# Patient Record
Sex: MALE | Race: WHITE | NOT HISPANIC OR LATINO | Employment: OTHER | URBAN - METROPOLITAN AREA
[De-identification: names, ages, dates, MRNs, and addresses within clinical notes are randomized per-mention and may not be internally consistent; named-entity substitution may affect disease eponyms.]

---

## 2017-08-03 ENCOUNTER — OFFICE VISIT (OUTPATIENT)
Dept: NEUROSURGERY | Facility: CLINIC | Age: 72
End: 2017-08-03

## 2017-08-03 VITALS — BODY MASS INDEX: 31.08 KG/M2 | HEIGHT: 67 IN | WEIGHT: 198 LBS

## 2017-08-03 DIAGNOSIS — M51.26 HERNIATION OF INTERVERTEBRAL DISC OF LUMBAR SPINE: Primary | ICD-10-CM

## 2017-08-03 DIAGNOSIS — M51.36 DDD (DEGENERATIVE DISC DISEASE), LUMBAR: ICD-10-CM

## 2017-08-03 PROCEDURE — 99203 OFFICE O/P NEW LOW 30 MIN: CPT | Performed by: NEUROLOGICAL SURGERY

## 2017-08-03 RX ORDER — TRIAMTERENE AND HYDROCHLOROTHIAZIDE 37.5; 25 MG/1; MG/1
1 TABLET ORAL EVERY MORNING
Refills: 1 | COMMUNITY
Start: 2017-04-28 | End: 2022-08-08 | Stop reason: SDUPTHER

## 2017-08-03 RX ORDER — MONTELUKAST SODIUM 10 MG/1
10 TABLET ORAL NIGHTLY
COMMUNITY
Start: 2017-05-23

## 2017-08-03 RX ORDER — LEVOTHYROXINE SODIUM 0.12 MG/1
125 TABLET ORAL DAILY
Refills: 1 | COMMUNITY
Start: 2017-06-23 | End: 2022-01-24

## 2017-08-03 RX ORDER — GLIMEPIRIDE 4 MG/1
4 TABLET ORAL 2 TIMES DAILY
Refills: 1 | COMMUNITY
Start: 2017-05-10

## 2017-08-03 RX ORDER — ATENOLOL 25 MG/1
25 TABLET ORAL
Refills: 3 | COMMUNITY
Start: 2017-05-10

## 2017-08-03 RX ORDER — TRANDOLAPRIL TABLETS 4 MG/1
4 TABLET ORAL EVERY MORNING
Refills: 3 | COMMUNITY
Start: 2017-06-16

## 2017-08-03 RX ORDER — PRAVASTATIN SODIUM 10 MG
TABLET ORAL
Refills: 1 | COMMUNITY
Start: 2017-06-23 | End: 2017-10-23

## 2017-08-03 NOTE — PROGRESS NOTES
Subjective   Patient ID: Joni Hoffman is a 72 y.o. male is being seen for consultation today at the request of Little Barker MD  Chief Complaint: Left hip and leg pain    History of Present Illness: The patient is a 72-year-old man from [facility] with a complaint of pain in his lower back that radiates down his left hip and leg as far as the foot.  It's worse when he sits a long time, although if he rides a tractor feels better.  He has had chiropractic therapy without seeing any real improvement.  Pain calms and goes, some days bothering him quite a bit, other days sometimes 2 or 3 in a row relatively minor.  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  About a year ago he had right back and hip pain which has improved.    His wife was seen earlier today with symptoms of lumbar lateral recess stenosis.    Review of Radiographic Studies:  Lumbar MRI scan dated 6/12/17 shows a herniated disc at L5-S1 on the left, with degenerative facet arthritis.    The following portions of the patient's history were reviewed, updated as appropriate and approved: allergies, current medications, past family history, past medical history, past social history, past surgical history, review of systems and problem list.  Review of Systems   Constitutional: Negative for activity change, appetite change, chills, diaphoresis, fatigue, fever and unexpected weight change.   HENT: Positive for hearing loss and sinus pressure. Negative for congestion, dental problem, drooling, ear discharge, ear pain, facial swelling, mouth sores, nosebleeds, postnasal drip, rhinorrhea, sneezing, sore throat, tinnitus, trouble swallowing and voice change.    Eyes: Negative for photophobia, pain, discharge, redness, itching and visual disturbance.   Respiratory: Negative for apnea, cough, choking, chest tightness, shortness of  breath, wheezing and stridor.    Cardiovascular: Positive for chest pain. Negative for palpitations and leg swelling.   Gastrointestinal: Negative for abdominal distention, abdominal pain, anal bleeding, blood in stool, constipation, diarrhea, nausea, rectal pain and vomiting.   Endocrine: Negative for cold intolerance, heat intolerance, polydipsia, polyphagia and polyuria.   Genitourinary: Negative for decreased urine volume, difficulty urinating, dysuria, enuresis, flank pain, frequency, genital sores, hematuria and urgency.   Musculoskeletal: Positive for arthralgias, back pain and myalgias. Negative for gait problem, joint swelling, neck pain and neck stiffness.   Skin: Negative for color change, pallor, rash and wound.   Allergic/Immunologic: Positive for environmental allergies and food allergies. Negative for immunocompromised state.   Neurological: Negative for dizziness, tremors, seizures, syncope, facial asymmetry, speech difficulty, weakness, light-headedness, numbness and headaches.   Hematological: Negative for adenopathy. Does not bruise/bleed easily.   Psychiatric/Behavioral: Negative for agitation, behavioral problems, confusion, decreased concentration, dysphoric mood, hallucinations, self-injury, sleep disturbance and suicidal ideas. The patient is not nervous/anxious and is not hyperactive.        Objective     NEUROLOGICAL EXAMINATION:      MENTAL STATUS:  Alert and oriented.  Speech intact.  Recent and remote memory intact.      CRANIAL NERVES:  Cranial nerve II:  Visual fields are full to confrontation.  Cranial nerves III, IV and VI:  PERRLADC.  Extraocular movements are intact.  Nystagmus is not present.  Cranial nerve V:  Facial sensation is intact to light touch.  Cranial nerve VII:  Muscles of facial expression reveal no asymmetry.  Cranial nerve VIII:  Hearing is intact to finger rub bilaterally.  Cranial nerves IX and X:  Palate elevates symmetrically.  Cranial nerve XI:  Shoulder shrug  is intact.  Cranial nerve XII:  Tongue is midline without evidence of atrophy or fasciculation.    MUSCULOSKELETAL:  SLR negative. Dmitriy's test negative.    MOTOR:  Deltoid, biceps, triceps, and  strength intact.  No hand atrophy.  Hip flexion, knee extension, ankle dorsiflexion and plantar flexion intact. No tremor, spasticity, ataxia, or dysmetria.    SENSATION: Intact to touch upper and lower extremities.  Position sense intact.    REFLEXES:  DTR absent on the left ankle, present at 2+ on the right ankle, 2+ in both knees.    Assessment   Symptomatic herniated disc L5-S1 left       Plan   Follow-up after his wife has her surgery to see if symptoms are still bothersome enough to consider surgery.  If so we would schedule a lumbar discectomy at L5-S1 on the left.       Elbert De Anda MD

## 2017-09-28 DIAGNOSIS — M51.26 HERNIATION OF INTERVERTEBRAL DISC OF LUMBAR SPINE: Primary | ICD-10-CM

## 2017-10-23 ENCOUNTER — APPOINTMENT (OUTPATIENT)
Dept: PREADMISSION TESTING | Facility: HOSPITAL | Age: 72
End: 2017-10-23

## 2017-10-23 VITALS — HEIGHT: 67 IN | WEIGHT: 207.4 LBS | BODY MASS INDEX: 32.55 KG/M2

## 2017-10-23 DIAGNOSIS — M51.26 LUMBAR DISC HERNIATION: ICD-10-CM

## 2017-10-23 DIAGNOSIS — M51.26 LUMBAR DISC HERNIATION: Primary | ICD-10-CM

## 2017-10-23 DIAGNOSIS — R79.9 ABNORMAL FINDING OF BLOOD CHEMISTRY: ICD-10-CM

## 2017-10-23 LAB
ALBUMIN SERPL-MCNC: 4.3 G/DL (ref 3.2–4.8)
ALBUMIN/GLOB SERPL: 1.5 G/DL (ref 1.5–2.5)
ALP SERPL-CCNC: 66 U/L (ref 25–100)
ALT SERPL W P-5'-P-CCNC: 28 U/L (ref 7–40)
ANION GAP SERPL CALCULATED.3IONS-SCNC: 8 MMOL/L (ref 3–11)
AST SERPL-CCNC: 17 U/L (ref 0–33)
BILIRUB SERPL-MCNC: 0.4 MG/DL (ref 0.3–1.2)
BUN BLD-MCNC: 27 MG/DL (ref 9–23)
BUN/CREAT SERPL: 19.3 (ref 7–25)
CALCIUM SPEC-SCNC: 9.7 MG/DL (ref 8.7–10.4)
CHLORIDE SERPL-SCNC: 102 MMOL/L (ref 99–109)
CO2 SERPL-SCNC: 26 MMOL/L (ref 20–31)
CREAT BLD-MCNC: 1.4 MG/DL (ref 0.6–1.3)
DEPRECATED RDW RBC AUTO: 42 FL (ref 37–54)
ERYTHROCYTE [DISTWIDTH] IN BLOOD BY AUTOMATED COUNT: 12.9 % (ref 11.3–14.5)
GFR SERPL CREATININE-BSD FRML MDRD: 50 ML/MIN/1.73
GLOBULIN UR ELPH-MCNC: 2.9 GM/DL
GLUCOSE BLD-MCNC: 146 MG/DL (ref 70–100)
HBA1C MFR BLD: 7.8 % (ref 4.8–5.6)
HCT VFR BLD AUTO: 46.4 % (ref 38.9–50.9)
HGB BLD-MCNC: 15.6 G/DL (ref 13.1–17.5)
MCH RBC QN AUTO: 30.1 PG (ref 27–31)
MCHC RBC AUTO-ENTMCNC: 33.6 G/DL (ref 32–36)
MCV RBC AUTO: 89.4 FL (ref 80–99)
PLATELET # BLD AUTO: 189 10*3/MM3 (ref 150–450)
PMV BLD AUTO: 10.1 FL (ref 6–12)
POTASSIUM BLD-SCNC: 4.1 MMOL/L (ref 3.5–5.5)
PROT SERPL-MCNC: 7.2 G/DL (ref 5.7–8.2)
RBC # BLD AUTO: 5.19 10*6/MM3 (ref 4.2–5.76)
SODIUM BLD-SCNC: 136 MMOL/L (ref 132–146)
WBC NRBC COR # BLD: 9.98 10*3/MM3 (ref 3.5–10.8)

## 2017-10-23 PROCEDURE — 93005 ELECTROCARDIOGRAM TRACING: CPT

## 2017-10-23 PROCEDURE — 85027 COMPLETE CBC AUTOMATED: CPT | Performed by: NEUROLOGICAL SURGERY

## 2017-10-23 PROCEDURE — 83036 HEMOGLOBIN GLYCOSYLATED A1C: CPT | Performed by: NEUROLOGICAL SURGERY

## 2017-10-23 PROCEDURE — 87081 CULTURE SCREEN ONLY: CPT | Performed by: NEUROLOGICAL SURGERY

## 2017-10-23 PROCEDURE — 36415 COLL VENOUS BLD VENIPUNCTURE: CPT

## 2017-10-23 PROCEDURE — 93010 ELECTROCARDIOGRAM REPORT: CPT | Performed by: INTERNAL MEDICINE

## 2017-10-23 PROCEDURE — 80053 COMPREHEN METABOLIC PANEL: CPT | Performed by: NEUROLOGICAL SURGERY

## 2017-10-23 RX ORDER — FAMOTIDINE 10 MG
10 TABLET ORAL EVERY MORNING
COMMUNITY

## 2017-10-23 RX ORDER — ASPIRIN 81 MG/1
81 TABLET ORAL DAILY
COMMUNITY

## 2017-10-23 RX ORDER — CHLORHEXIDINE GLUCONATE 4 G/100ML
SOLUTION TOPICAL
Qty: 120 ML
Start: 2017-10-23 | End: 2017-10-27 | Stop reason: HOSPADM

## 2017-10-23 RX ORDER — ASPIRIN 325 MG
325 TABLET ORAL DAILY
Status: ON HOLD | COMMUNITY
End: 2017-10-27 | Stop reason: ALTCHOICE

## 2017-10-23 RX ORDER — CETIRIZINE HYDROCHLORIDE 10 MG/1
10 TABLET ORAL
Status: ON HOLD | COMMUNITY
End: 2020-02-19

## 2017-10-23 RX ORDER — MOMETASONE FUROATE 50 UG/1
2 SPRAY, METERED NASAL EVERY MORNING
COMMUNITY

## 2017-10-23 RX ORDER — SODIUM CHLORIDE, SODIUM LACTATE, POTASSIUM CHLORIDE, CALCIUM CHLORIDE 600; 310; 30; 20 MG/100ML; MG/100ML; MG/100ML; MG/100ML
100 INJECTION, SOLUTION INTRAVENOUS CONTINUOUS
Status: CANCELLED | OUTPATIENT
Start: 2017-10-23

## 2017-10-23 RX ORDER — ROSUVASTATIN CALCIUM 10 MG/1
10 TABLET, COATED ORAL NIGHTLY
COMMUNITY
End: 2021-09-20 | Stop reason: ALTCHOICE

## 2017-10-23 RX ORDER — SODIUM CHLORIDE 0.9 % (FLUSH) 0.9 %
1-10 SYRINGE (ML) INJECTION AS NEEDED
Status: CANCELLED | OUTPATIENT
Start: 2017-10-23

## 2017-10-23 NOTE — H&P
HISTORY AND PHYSICAL for SURGERY    Referring Provider: Little Barker MD    Chief complaint: Left hip and leg pain    Subjective .   History of present illness:    The patient is a 72-year-old man from [facility] with a complaint of pain in his lower back that radiates down his left hip and leg as far as the foot.  It's worse when he sits a long time, although if he rides a tractor feels better.  He has had chiropractic therapy without seeing any real improvement.  Pain calms and goes, some days bothering him quite a bit, other days sometimes 2 or 3 in a row relatively minor.  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  About a year ago he had right back and hip pain which has improved.    History  Past Medical History:   Diagnosis Date   • Arthritis    • Diabetes mellitus       Past Surgical History:   Procedure Laterality Date   • CORONARY STENT PLACEMENT      family history includes Diabetes in his sister; Heart disease in his brother, father, and mother.   Social History     Social History   • Marital status:      Spouse name: N/A   • Number of children: N/A   • Years of education: N/A     Social History Main Topics   • Smoking status: Current Every Day Smoker   • Smokeless tobacco: Not on file   • Alcohol use Not on file   • Drug use: Not on file   • Sexual activity: Not on file     Other Topics Concern   • Not on file     Social History Narrative   • No narrative on file       Allergies:  Penicillins    Objective   Review of Systems  Constitutional: Negative for activity change, appetite change, chills, diaphoresis, fatigue, fever and unexpected weight change.   HENT: Positive for hearing loss and sinus pressure. Negative for congestion, dental problem, drooling, ear discharge, ear pain, facial swelling, mouth sores, nosebleeds, postnasal drip, rhinorrhea, sneezing, sore  throat, tinnitus, trouble swallowing and voice change.    Eyes: Negative for photophobia, pain, discharge, redness, itching and visual disturbance.   Respiratory: Negative for apnea, cough, choking, chest tightness, shortness of breath, wheezing and stridor.    Cardiovascular: Positive for chest pain. Negative for palpitations and leg swelling.   Gastrointestinal: Negative for abdominal distention, abdominal pain, anal bleeding, blood in stool, constipation, diarrhea, nausea, rectal pain and vomiting.   Endocrine: Negative for cold intolerance, heat intolerance, polydipsia, polyphagia and polyuria.   Genitourinary: Negative for decreased urine volume, difficulty urinating, dysuria, enuresis, flank pain, frequency, genital sores, hematuria and urgency.   Musculoskeletal: Positive for arthralgias, back pain and myalgias. Negative for gait problem, joint swelling, neck pain and neck stiffness.   Skin: Negative for color change, pallor, rash and wound.   Allergic/Immunologic: Positive for environmental allergies and food allergies. Negative for immunocompromised state.   Neurological: Negative for dizziness, tremors, seizures, syncope, facial asymmetry, speech difficulty, weakness, light-headedness, numbness and headaches.   Hematological: Negative for adenopathy. Does not bruise/bleed easily.   Psychiatric/Behavioral: Negative for agitation, behavioral problems, confusion, decreased concentration, dysphoric mood, hallucinations, self-injury, sleep disturbance and suicidal ideas. The patient is not nervous/anxious and is not hyperactive.         Physical Exam:  NEUROLOGICAL EXAMINATION:       MENTAL STATUS:  Alert and oriented.  Speech intact.  Recent and remote memory intact.       CRANIAL NERVES:  Cranial nerve II:  Visual fields are full to confrontation.  Cranial nerves III, IV and VI:  PERRLADC.  Extraocular movements are intact.  Nystagmus is not present.  Cranial nerve V:  Facial sensation is intact to light  touch.  Cranial nerve VII:  Muscles of facial expression reveal no asymmetry.  Cranial nerve VIII:  Hearing is intact to finger rub bilaterally.  Cranial nerves IX and X:  Palate elevates symmetrically.  Cranial nerve XI:  Shoulder shrug is intact.  Cranial nerve XII:  Tongue is midline without evidence of atrophy or fasciculation.     MUSCULOSKELETAL:  SLR negative. Dmitriy's test negative.     MOTOR:  Deltoid, biceps, triceps, and  strength intact.  No hand atrophy.  Hip flexion, knee extension, ankle dorsiflexion and plantar flexion intact. No tremor, spasticity, ataxia, or dysmetria.     SENSATION: Intact to touch upper and lower extremities.  Position sense intact.     REFLEXES:  DTR absent on the left ankle, present at 2+ on the right ankle, 2+ in both knees.      Results Review:  Lumbar MRI scan dated 6/12/17 shows a herniated disc at L5-S1 on the left, with degenerative facet arthritis.    Assessment/Plan   Assessment       Symptomatic herniated disc L5-S1 left    Plan:   lumbar discectomy at L5-S1 on the left.       Dona Greer PA-C  On behalf of Elbert De Anda MD  10/23/17  10:33 AM

## 2017-10-25 LAB — MRSA SPEC QL CULT: NORMAL

## 2017-10-26 ENCOUNTER — DOCUMENTATION (OUTPATIENT)
Dept: NEUROSURGERY | Facility: CLINIC | Age: 72
End: 2017-10-26

## 2017-10-26 NOTE — PROGRESS NOTES
Pt called this morning to let us know that she fell earlier this morning in her living room on her R knee. She wanted to know if she is still okay to have surgery tomorrow (10/27/17) with     She states that it doesn't hurt, no swelling, doesn't hurt when she walks or any other type of movement. She said it looks a little bruised.    I adv her that she should be perfectly fine to undergo surgery, and that she is in good hands. Pt verbalized understanding and will call me if she has any issues with her R-Knee.

## 2017-10-27 ENCOUNTER — ANESTHESIA (OUTPATIENT)
Dept: PERIOP | Facility: HOSPITAL | Age: 72
End: 2017-10-27

## 2017-10-27 ENCOUNTER — APPOINTMENT (OUTPATIENT)
Dept: GENERAL RADIOLOGY | Facility: HOSPITAL | Age: 72
End: 2017-10-27

## 2017-10-27 ENCOUNTER — HOSPITAL ENCOUNTER (OUTPATIENT)
Facility: HOSPITAL | Age: 72
Discharge: HOME | End: 2017-10-27
Attending: NEUROLOGICAL SURGERY | Admitting: NEUROLOGICAL SURGERY

## 2017-10-27 ENCOUNTER — ANESTHESIA EVENT (OUTPATIENT)
Dept: PERIOP | Facility: HOSPITAL | Age: 72
End: 2017-10-27

## 2017-10-27 VITALS
OXYGEN SATURATION: 94 % | TEMPERATURE: 98.1 F | HEIGHT: 67 IN | DIASTOLIC BLOOD PRESSURE: 71 MMHG | WEIGHT: 207 LBS | SYSTOLIC BLOOD PRESSURE: 131 MMHG | RESPIRATION RATE: 20 BRPM | BODY MASS INDEX: 32.49 KG/M2 | HEART RATE: 64 BPM

## 2017-10-27 DIAGNOSIS — M51.26 LUMBAR DISC HERNIATION: ICD-10-CM

## 2017-10-27 LAB
GLUCOSE BLDC GLUCOMTR-MCNC: 173 MG/DL (ref 70–130)
GLUCOSE BLDC GLUCOMTR-MCNC: 199 MG/DL (ref 70–130)
POTASSIUM BLDA-SCNC: 4.09 MMOL/L (ref 3.5–5.3)

## 2017-10-27 PROCEDURE — S0260 H&P FOR SURGERY: HCPCS | Performed by: NEUROLOGICAL SURGERY

## 2017-10-27 PROCEDURE — A9270 NON-COVERED ITEM OR SERVICE: HCPCS | Performed by: ANESTHESIOLOGY

## 2017-10-27 PROCEDURE — 76000 FLUOROSCOPY <1 HR PHYS/QHP: CPT

## 2017-10-27 PROCEDURE — 25010000002 ONDANSETRON PER 1 MG: Performed by: NURSE ANESTHETIST, CERTIFIED REGISTERED

## 2017-10-27 PROCEDURE — 25010000002 PROPOFOL 10 MG/ML EMULSION: Performed by: NURSE ANESTHETIST, CERTIFIED REGISTERED

## 2017-10-27 PROCEDURE — 63710000001 FAMOTIDINE 20 MG TABLET: Performed by: ANESTHESIOLOGY

## 2017-10-27 PROCEDURE — 63710000001 INSULIN LISPRO (HUMAN) PER 5 UNITS: Performed by: NURSE ANESTHETIST, CERTIFIED REGISTERED

## 2017-10-27 PROCEDURE — 63030 LAMOT DCMPRN NRV RT 1 LMBR: CPT | Performed by: NEUROLOGICAL SURGERY

## 2017-10-27 PROCEDURE — 25010000002 DEXAMETHASONE PER 1 MG: Performed by: NURSE ANESTHETIST, CERTIFIED REGISTERED

## 2017-10-27 PROCEDURE — A9270 NON-COVERED ITEM OR SERVICE: HCPCS | Performed by: NURSE ANESTHETIST, CERTIFIED REGISTERED

## 2017-10-27 PROCEDURE — 25010000002 PHENYLEPHRINE PER 1 ML: Performed by: NURSE ANESTHETIST, CERTIFIED REGISTERED

## 2017-10-27 PROCEDURE — 25010000002 NEOSTIGMINE PER 0.5 MG: Performed by: NURSE ANESTHETIST, CERTIFIED REGISTERED

## 2017-10-27 PROCEDURE — 25010000002 FENTANYL CITRATE (PF) 100 MCG/2ML SOLUTION: Performed by: NURSE ANESTHETIST, CERTIFIED REGISTERED

## 2017-10-27 PROCEDURE — 82962 GLUCOSE BLOOD TEST: CPT

## 2017-10-27 PROCEDURE — 25010000002 VANCOMYCIN 10 G RECONSTITUTED SOLUTION: Performed by: NEUROLOGICAL SURGERY

## 2017-10-27 PROCEDURE — 84132 ASSAY OF SERUM POTASSIUM: CPT | Performed by: ANESTHESIOLOGY

## 2017-10-27 RX ORDER — SODIUM CHLORIDE, SODIUM LACTATE, POTASSIUM CHLORIDE, CALCIUM CHLORIDE 600; 310; 30; 20 MG/100ML; MG/100ML; MG/100ML; MG/100ML
INJECTION, SOLUTION INTRAVENOUS CONTINUOUS PRN
Status: DISCONTINUED | OUTPATIENT
Start: 2017-10-27 | End: 2017-10-27

## 2017-10-27 RX ORDER — SODIUM CHLORIDE 0.9 % (FLUSH) 0.9 %
1-10 SYRINGE (ML) INJECTION AS NEEDED
Status: DISCONTINUED | OUTPATIENT
Start: 2017-10-27 | End: 2017-10-27 | Stop reason: HOSPADM

## 2017-10-27 RX ORDER — MAGNESIUM HYDROXIDE 1200 MG/15ML
LIQUID ORAL AS NEEDED
Status: DISCONTINUED | OUTPATIENT
Start: 2017-10-27 | End: 2017-10-27 | Stop reason: HOSPADM

## 2017-10-27 RX ORDER — FAMOTIDINE 10 MG/ML
20 INJECTION, SOLUTION INTRAVENOUS ONCE
Status: DISCONTINUED | OUTPATIENT
Start: 2017-10-27 | End: 2017-10-27

## 2017-10-27 RX ORDER — PROMETHAZINE HYDROCHLORIDE 25 MG/1
25 TABLET ORAL ONCE AS NEEDED
Status: DISCONTINUED | OUTPATIENT
Start: 2017-10-27 | End: 2017-10-27 | Stop reason: HOSPADM

## 2017-10-27 RX ORDER — PROMETHAZINE HYDROCHLORIDE 25 MG/1
25 SUPPOSITORY RECTAL ONCE AS NEEDED
Status: DISCONTINUED | OUTPATIENT
Start: 2017-10-27 | End: 2017-10-27 | Stop reason: HOSPADM

## 2017-10-27 RX ORDER — GLYCOPYRROLATE 0.2 MG/ML
INJECTION INTRAMUSCULAR; INTRAVENOUS AS NEEDED
Status: DISCONTINUED | OUTPATIENT
Start: 2017-10-27 | End: 2017-10-27 | Stop reason: SURG

## 2017-10-27 RX ORDER — ATRACURIUM BESYLATE 10 MG/ML
INJECTION, SOLUTION INTRAVENOUS AS NEEDED
Status: DISCONTINUED | OUTPATIENT
Start: 2017-10-27 | End: 2017-10-27 | Stop reason: SURG

## 2017-10-27 RX ORDER — LIDOCAINE HYDROCHLORIDE 10 MG/ML
0.5 INJECTION, SOLUTION EPIDURAL; INFILTRATION; INTRACAUDAL; PERINEURAL ONCE AS NEEDED
Status: COMPLETED | OUTPATIENT
Start: 2017-10-27 | End: 2017-10-27

## 2017-10-27 RX ORDER — ONDANSETRON 2 MG/ML
INJECTION INTRAMUSCULAR; INTRAVENOUS AS NEEDED
Status: DISCONTINUED | OUTPATIENT
Start: 2017-10-27 | End: 2017-10-27 | Stop reason: SURG

## 2017-10-27 RX ORDER — PROMETHAZINE HYDROCHLORIDE 25 MG/ML
6.25 INJECTION, SOLUTION INTRAMUSCULAR; INTRAVENOUS ONCE AS NEEDED
Status: DISCONTINUED | OUTPATIENT
Start: 2017-10-27 | End: 2017-10-27 | Stop reason: HOSPADM

## 2017-10-27 RX ORDER — ONDANSETRON 2 MG/ML
4 INJECTION INTRAMUSCULAR; INTRAVENOUS ONCE AS NEEDED
Status: DISCONTINUED | OUTPATIENT
Start: 2017-10-27 | End: 2017-10-27 | Stop reason: HOSPADM

## 2017-10-27 RX ORDER — SODIUM CHLORIDE 9 MG/ML
9 INJECTION, SOLUTION INTRAVENOUS CONTINUOUS PRN
Status: DISCONTINUED | OUTPATIENT
Start: 2017-10-27 | End: 2017-10-27 | Stop reason: HOSPADM

## 2017-10-27 RX ORDER — DEXAMETHASONE SODIUM PHOSPHATE 4 MG/ML
INJECTION, SOLUTION INTRA-ARTICULAR; INTRALESIONAL; INTRAMUSCULAR; INTRAVENOUS; SOFT TISSUE AS NEEDED
Status: DISCONTINUED | OUTPATIENT
Start: 2017-10-27 | End: 2017-10-27 | Stop reason: SURG

## 2017-10-27 RX ORDER — FENTANYL CITRATE 50 UG/ML
INJECTION, SOLUTION INTRAMUSCULAR; INTRAVENOUS AS NEEDED
Status: DISCONTINUED | OUTPATIENT
Start: 2017-10-27 | End: 2017-10-27 | Stop reason: SURG

## 2017-10-27 RX ORDER — FAMOTIDINE 20 MG/1
20 TABLET, FILM COATED ORAL ONCE
Status: COMPLETED | OUTPATIENT
Start: 2017-10-27 | End: 2017-10-27

## 2017-10-27 RX ORDER — BUPIVACAINE HYDROCHLORIDE AND EPINEPHRINE 2.5; 5 MG/ML; UG/ML
INJECTION, SOLUTION EPIDURAL; INFILTRATION; INTRACAUDAL; PERINEURAL AS NEEDED
Status: DISCONTINUED | OUTPATIENT
Start: 2017-10-27 | End: 2017-10-27 | Stop reason: HOSPADM

## 2017-10-27 RX ORDER — FENTANYL CITRATE 50 UG/ML
50 INJECTION, SOLUTION INTRAMUSCULAR; INTRAVENOUS
Status: DISCONTINUED | OUTPATIENT
Start: 2017-10-27 | End: 2017-10-27 | Stop reason: HOSPADM

## 2017-10-27 RX ORDER — SODIUM CHLORIDE, SODIUM LACTATE, POTASSIUM CHLORIDE, CALCIUM CHLORIDE 600; 310; 30; 20 MG/100ML; MG/100ML; MG/100ML; MG/100ML
9 INJECTION, SOLUTION INTRAVENOUS CONTINUOUS
Status: DISCONTINUED | OUTPATIENT
Start: 2017-10-27 | End: 2017-10-27

## 2017-10-27 RX ORDER — LIDOCAINE HYDROCHLORIDE 10 MG/ML
INJECTION, SOLUTION INFILTRATION; PERINEURAL AS NEEDED
Status: DISCONTINUED | OUTPATIENT
Start: 2017-10-27 | End: 2017-10-27 | Stop reason: SURG

## 2017-10-27 RX ORDER — PROPOFOL 10 MG/ML
VIAL (ML) INTRAVENOUS AS NEEDED
Status: DISCONTINUED | OUTPATIENT
Start: 2017-10-27 | End: 2017-10-27 | Stop reason: SURG

## 2017-10-27 RX ORDER — HYDROMORPHONE HYDROCHLORIDE 1 MG/ML
0.5 INJECTION, SOLUTION INTRAMUSCULAR; INTRAVENOUS; SUBCUTANEOUS
Status: DISCONTINUED | OUTPATIENT
Start: 2017-10-27 | End: 2017-10-27 | Stop reason: HOSPADM

## 2017-10-27 RX ADMIN — LIDOCAINE HYDROCHLORIDE 50 MG: 10 INJECTION, SOLUTION INFILTRATION; PERINEURAL at 08:07

## 2017-10-27 RX ADMIN — FENTANYL CITRATE 50 MCG: 50 INJECTION, SOLUTION INTRAMUSCULAR; INTRAVENOUS at 08:07

## 2017-10-27 RX ADMIN — EPHEDRINE SULFATE 10 MG: 50 INJECTION INTRAMUSCULAR; INTRAVENOUS; SUBCUTANEOUS at 08:18

## 2017-10-27 RX ADMIN — Medication 4 MG: at 08:49

## 2017-10-27 RX ADMIN — INSULIN LISPRO 2 UNITS: 100 INJECTION, SOLUTION INTRAVENOUS; SUBCUTANEOUS at 09:30

## 2017-10-27 RX ADMIN — GLYCOPYRROLATE 0.6 MG: 0.2 INJECTION, SOLUTION INTRAMUSCULAR; INTRAVENOUS at 08:49

## 2017-10-27 RX ADMIN — ATRACURIUM BESYLATE 40 MG: 10 INJECTION, SOLUTION INTRAVENOUS at 08:07

## 2017-10-27 RX ADMIN — FAMOTIDINE 20 MG: 20 TABLET, FILM COATED ORAL at 06:45

## 2017-10-27 RX ADMIN — SODIUM CHLORIDE 9 ML/HR: 9 INJECTION, SOLUTION INTRAVENOUS at 06:23

## 2017-10-27 RX ADMIN — LIDOCAINE HYDROCHLORIDE 0.2 ML: 10 INJECTION, SOLUTION EPIDURAL; INFILTRATION; INTRACAUDAL; PERINEURAL at 06:23

## 2017-10-27 RX ADMIN — PROPOFOL 150 MG: 10 INJECTION, EMULSION INTRAVENOUS at 08:07

## 2017-10-27 RX ADMIN — EPHEDRINE SULFATE 10 MG: 50 INJECTION INTRAMUSCULAR; INTRAVENOUS; SUBCUTANEOUS at 08:28

## 2017-10-27 RX ADMIN — FENTANYL CITRATE 50 MCG: 50 INJECTION, SOLUTION INTRAMUSCULAR; INTRAVENOUS at 08:57

## 2017-10-27 RX ADMIN — GLYCOPYRROLATE 0.2 MG: 0.2 INJECTION, SOLUTION INTRAMUSCULAR; INTRAVENOUS at 08:30

## 2017-10-27 RX ADMIN — DEXAMETHASONE SODIUM PHOSPHATE 8 MG: 4 INJECTION, SOLUTION INTRAMUSCULAR; INTRAVENOUS at 08:15

## 2017-10-27 RX ADMIN — PHENYLEPHRINE HYDROCHLORIDE 100 MCG: 10 INJECTION INTRAVENOUS at 08:11

## 2017-10-27 RX ADMIN — ONDANSETRON 4 MG: 2 INJECTION INTRAMUSCULAR; INTRAVENOUS at 08:26

## 2017-10-27 RX ADMIN — VANCOMYCIN HYDROCHLORIDE 1250 MG: 10 INJECTION, POWDER, LYOPHILIZED, FOR SOLUTION INTRAVENOUS at 07:12

## 2017-10-27 RX ADMIN — SODIUM CHLORIDE: 9 INJECTION, SOLUTION INTRAVENOUS at 08:02

## 2017-10-27 NOTE — OP NOTE
OPERATIVE REPORT    DATE OF OPERATION: 10/27/17    PREOPERATIVE DIAGNOSIS: Lateral recess stenosis with herniated disc L5-S1 left    POSTOPERATIVE DIAGNOSIS: Same    PROCEDURE PERFORMED: Left L5-S1 lumbar laminotomy and discectomy    SURGEON: Elbert De Anda MD    ASSISTANT: Dona Greer PA-C    INDICATIONS: Patient had a persisting left hip and leg pain in a sciatic pattern with MRI findings of a herniated disc L5-S1 on the left with lateral recess stenosis.  Decompression was elected.    DESCRIPTON OF PROCEDURE: Surgery was performed under general anesthesia in the prone position on the laminectomy frame.  The back was prepared sterilely and draped.  The left L5 lamina was identified with a spinal needle and an AP fluoroscopic image.  A short skin incision was made and a guidewire and dilators were used to place a 6 cm length x 18 mm width tubular retractor.  AP fluoroscopy confirmed this to be the [L5-S1] level on the [left].    A high-speed drill was used to perform a laminotomy by removing the inferior aspect of the lamina and thinning the medial aspect of the facet until the floor of the lateral recess was reached.  A Kerrison punch was used to remove the ligamentum flavum and expose the dura and floor of the lateral recess.  The epidural veins were coagulated with bipolar and divided with microscissors.    The dural sac and nerve root were retracted medially exposing the herniated disc.  A focal subligamentous disc herniation with a small hemispheric protrusion was found.  A nerve root retractor was placed over the nerve root sleeve for exposure of the disc. The epidural veins were further coagulated and Gelfoam and a cottonoid placed in the cranial portion of the lateral recess for hemostasis.. An #15 scalpel blade was used to open the posterior disc annulus. Discectomy was performed using straight and up-biting micropituitary rongeurs, removing the herniated disc component and performing a limited nuclear  discectomy.  Following discectomy the dura, nerve root and epidural space were inspected to be sure decompression was complete and no disc fragments remained.     The nerve root retractor was removed and the laminotomy site was irrigated to confirm complete hemostasis. Gelfoam was placed over the laminotomy site.  The tubular retractor was removed.  Marcaine was injected in the paraspinous muscle.  Subcuticular Vicryl closure was performed and Dermabond Prineo was applied to the skin.  Blood loss was estimated at 15 mL.    Elbert De Anda M.D.

## 2017-10-27 NOTE — ANESTHESIA PREPROCEDURE EVALUATION
Anesthesia Evaluation     Patient summary reviewed and Nursing notes reviewed   no history of anesthetic complications:  NPO Solid Status: > 8 hours  NPO Liquid Status: > 8 hours     Airway   Mallampati: III  TM distance: >3 FB  Neck ROM: full  possible difficult intubation  Dental - normal exam     Pulmonary - normal exam   (+) a smoker Former, sleep apnea (snores heavily but no official diagnosis),   Cardiovascular   Exercise tolerance: good (4-7 METS)    ECG reviewed  Rhythm: regular  Rate: normal    (+) hypertension well controlled, past MI  >12 months, CAD, cardiac stents more than 12 months ago   (-) angina, MILNER      Neuro/Psych  (-) seizures, neuromuscular disease, TIA, syncope, tremors, weakness  GI/Hepatic/Renal/Endo    (+)  renal disease CRI, diabetes mellitus type 2 well controlled, hypothyroidism,   (-) GERD, hepatitis, liver disease    Musculoskeletal     (+) arthralgias, back pain,   Abdominal    Substance History - negative use     OB/GYN          Other   (+) arthritis     (-) blood dyscrasia, history of cancer                                  Anesthesia Plan    ASA 3     general   (Labs/studies reviewed  Possible glidescope)  intravenous induction   Anesthetic plan and risks discussed with patient.    Plan discussed with CRNA.

## 2017-10-27 NOTE — ANESTHESIA POSTPROCEDURE EVALUATION
Patient: Joni Hoffman    Procedure Summary     Date Anesthesia Start Anesthesia Stop Room / Location    10/27/17 0802 0908  [facility] / [facility]       Procedure Diagnosis Surgeon Provider    LUMBAR DISCECTOMY L5-S1 LEFT  (Left Spine Lumbar) Herniation of intervertebral disc of lumbar spine  (Herniation of intervertebral disc of lumbar spine [M51.26]) MD Chiara Lyman MD          Anesthesia Type: general  Last vitals  BP   123/91 (10/27/17 0907)   Temp   98.2 °F (36.8 °C) (10/27/17 0907)   Pulse   84 (10/27/17 0907)   Resp   18 (10/27/17 0907)     SpO2   96 % (10/27/17 0907)     Post Anesthesia Care and Evaluation    Patient location during evaluation: PACU  Patient participation: complete - patient participated  Level of consciousness: awake and alert  Pain score: 0  Pain management: adequate  Airway patency: patent  Anesthetic complications: No anesthetic complications  PONV Status: none  Cardiovascular status: hemodynamically stable and acceptable  Respiratory status: nonlabored ventilation, acceptable and nasal cannula  Hydration status: acceptable

## 2017-10-27 NOTE — BRIEF OP NOTE
LUMBAR DISCECTOMY MICRO ENDOSCOPIC  Progress Note    Joni Hoffman  10/27/2017    Pre-op Diagnosis:   Herniation of intervertebral disc of lumbar spine [M51.26]       Post-Op Diagnosis Codes:     * Herniation of intervertebral disc of lumbar spine [M51.26]    Procedure/CPT® Codes:      Procedure(s):  LUMBAR DISCECTOMY L5-S1 LEFT     Surgeon(s):  Elbert De Anda MD    Anesthesia: General    Staff:   Circulator: Altagracia Nunez RN  Scrub Person: Emma Kendall  Nursing Assistant: Alton Benavidez  Assistant: Dona Greer PA-C  Orientee: Lulu Rosado RN; Shelby Patel    Estimated Blood Loss: minimal    Urine Voided: * No values recorded between 10/27/2017  8:02 AM and 10/27/2017  8:53 AM *    Specimens:                None      Drains:           Findings: Lateral recess stenosis with herniated disc L5-S1 left    Complications: None      Elbert De Anda MD     Date: 10/27/2017  Time: 8:53 AM

## 2017-10-27 NOTE — PLAN OF CARE
Problem: Patient Care Overview (Adult)  Goal: Discharge Needs Assessment  Outcome: Outcome(s) achieved Date Met:  10/27/17

## 2017-10-27 NOTE — PLAN OF CARE
Problem: Perioperative Period (Adult)  Goal: Signs and Symptoms of Listed Potential Problems Will be Absent or Manageable (Perioperative Period)  Outcome: Outcome(s) achieved Date Met:  10/27/17

## 2017-10-27 NOTE — H&P
Pre-Op H&P  Joni Hoffman  [id number]  1945    Date of Service: 10/27/2017    Chief complaint:  Back and left leg pain    HPI:Patient is a 72 y.o.male presents with 10 month history of back and radiating left leg pain. He denies any changes in presenting symptoms since last seen by the MD. No recent fever.     He has 2 cardiac stents and has cardiac clearance for surgery today.    Review of Systems:  General ROS: negative for chills, fever or skin lesions;  No changes since last office visit  Cardiovascular ROS: no chest pain or dyspnea on exertion  Respiratory ROS: no cough, shortness of breath, or wheezing    Allergies:   Allergies   Allergen Reactions   • Penicillins Rash       Home Meds:    Prescriptions Prior to Admission   Medication Sig Dispense Refill Last Dose   • aspirin 81 MG EC tablet Take 81 mg by mouth Daily.   10/27/2017 at 0430   • atenolol (TENORMIN) 25 MG tablet Take 25 mg by mouth Daily With Breakfast.  3 10/27/2017 at 0430   • cetirizine (zyrTEC) 10 MG tablet Take 10 mg by mouth As Needed for Allergies.   10/27/2017 at 0430   • chlorhexidine (HIBICLENS) 4 % external liquid Shower each day with solution for 5 days beginning 5 days before surgery. 120 mL  10/26/2017 at 0830   • famotidine (PEPCID) 10 MG tablet Take 10 mg by mouth Every Morning.   10/27/2017 at 0430   • glimepiride (AMARYL) 4 MG tablet Take 4 mg by mouth 2 (Two) Times a Day. Takes 4 mg in the am and 2 mg in the PM  1 10/26/2017 at 2030   • levothyroxine (SYNTHROID, LEVOTHROID) 125 MCG tablet Take 125 mcg by mouth Daily.  1 10/26/2017 at 0430   • montelukast (SINGULAIR) 10 MG tablet Take 10 mg by mouth Every Night.   10/26/2017 at 2030   • rosuvastatin (CRESTOR) 10 MG tablet Take 10 mg by mouth Daily.   10/27/2017 at 0430   • trandolapril (MAVIK) 4 MG tablet Take 4 mg by mouth Daily.  3 10/27/2017 at 0430   • triamterene-hydrochlorothiazide (MAXZIDE-25) 37.5-25 MG per tablet Take 1 tablet by mouth Daily.  1 10/27/2017 at 0434  "  • mometasone (NASONEX) 50 MCG/ACT nasal spray 2 sprays into each nostril Daily.   1 week ago       PMH:   Past Medical History:   Diagnosis Date   • Acid indigestion     related to meds   • Arthritis    • Coronary artery disease 2000    s/p 2 stents after MI    • Diabetes mellitus 2007    po meds; checks blood sugars at home run    • Disease of thyroid gland     hypothyroidism    • Hearing loss     uses hearing aids    • History of kidney stones     passed   • Hypertension    • Lumbar back pain    • MI, old 2000    2 stents inserted   • Wears glasses    • Wears hearing aid      PSH:    Past Surgical History:   Procedure Laterality Date   • APPENDECTOMY     • CATARACT EXTRACTION WITH INTRAOCULAR LENS IMPLANT Bilateral    • COLONOSCOPY     • CORONARY STENT PLACEMENT     • NASAL POLYP SURGERY         Immunization History:  Influenza: 2016  Pneumococcal: yes  Tetanus: yes    Social History:   Tobacco:   History   Smoking Status   • Former Smoker   • Packs/day: 1.00   • Years: 25.00   • Types: Cigarettes   • Quit date: 1985   Smokeless Tobacco   • Current User   • Types: Chew     Comment: hx of cigarette use for 25 years and chewed about 25 years (currently chews on occasion)       Alcohol:     History   Alcohol Use No       Vitals:           /82 (BP Location: Right arm, Patient Position: Lying)  Pulse 64  Temp 97 °F (36.1 °C) (Temporal Artery )   Resp 18  Ht 67\" (170.2 cm)  Wt 207 lb (93.9 kg)  SpO2 95%  BMI 32.42 kg/m2    Physical Exam:  General Appearance:    Alert, cooperative, no distress, appears stated age   Head:    Normocephalic, without obvious abnormality, atraumatic   Lungs:     Clear to auscultation bilaterally, respirations unlabored    Heart:   Regular rate and rhythm, S1 and S2 normal, no murmur, rub    or gallop    Abdomen:    Soft, non-tender.  +bowel sounds   Breast Exam:    deferred   Genitalia:    deferred   Extremities:   Extremities normal, atraumatic, no cyanosis or edema "   Skin:   Skin color, texture, turgor normal, no rashes or lesions   Neurologic:   Grossly intact   Results Review  I reviewed the patient's new clinical results.  CBC, Chem profile, and EKG on chart.    Cancer Staging (if applicable)  Cancer Patient: __ yes _x_no __unknown; If yes, clinical stage T:__ N:__M:__, stage group or __N/A    Impression: Herniated lumbar disc    Plan: For left L5S1 discectomy today.    IRAIS Gauthier   10/27/2017   6:50 AM

## 2017-11-28 ENCOUNTER — OFFICE VISIT (OUTPATIENT)
Dept: NEUROSURGERY | Facility: CLINIC | Age: 72
End: 2017-11-28

## 2017-11-28 VITALS
BODY MASS INDEX: 32.18 KG/M2 | TEMPERATURE: 96.3 F | WEIGHT: 205 LBS | DIASTOLIC BLOOD PRESSURE: 68 MMHG | SYSTOLIC BLOOD PRESSURE: 130 MMHG | HEART RATE: 69 BPM | HEIGHT: 67 IN

## 2017-11-28 DIAGNOSIS — M51.26 LUMBAR DISC HERNIATION: Primary | ICD-10-CM

## 2017-11-28 PROCEDURE — 99024 POSTOP FOLLOW-UP VISIT: CPT | Performed by: PHYSICIAN ASSISTANT

## 2017-11-28 NOTE — PROGRESS NOTES
Joni Hoffman is a 72 y.o. male who presents for a 4 week  follow up appointment status post left L5-S1 lumbar laminotomy and discectomy that took place on 10/27/2017.     HISTORY OF PRESENT ILLNESS and HOSPITAL COURSE:  The patient is a 72-year-old man from [facility] with a complaint of pain in his lower back that radiates down his left hip and leg as far as the foot.  It's worse when he sits a long time, although if he rides a tractor feels better.  He has had chiropractic therapy without seeing any real improvement.  Pain calms and goes, some days bothering him quite a bit, other days sometimes 2 or 3 in a row relatively minor.  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  About a year ago he had right back and hip pain which has improved.    In the post-operative 4 weeks the patient has had excellent relief of the symptoms experienced prior to surgery.  The surgical incision site is clean, dry, healed.  The symptoms that he experienced prior to surgery have significantly improved and he is very pleased with the surgical outcome.    REVIEW OF SYSTEMS:  Review of Systems   Constitutional: Negative for chills, fatigue and fever.   HENT: Positive for hearing loss, sneezing and tinnitus.    Eyes: Positive for redness.   Musculoskeletal: Negative for arthralgias, back pain and myalgias.   Allergic/Immunologic: Positive for food allergies.   Neurological: Negative for weakness and numbness.   Psychiatric/Behavioral: Negative for sleep disturbance.       The following portions of the patient's history were reviewed and updated as appropriate: allergies, current medications, past family history, past medical history, past social history, past surgical history and problem list.     PHYSICAL EXAM:  Physical Exam   Constitutional: He is oriented to person, place, and time. He appears  well-developed and well-nourished.   Neck: Normal range of motion. Neck supple.   Pulmonary/Chest: Effort normal and breath sounds normal.   Musculoskeletal: Normal range of motion.   Neurological: He is alert and oriented to person, place, and time.   Skin: Skin is warm, dry and intact.   Psychiatric: He has a normal mood and affect. His behavior is normal. Judgment and thought content normal.        ASSESSMENT AND PLAN:  Mr. Hoffman is a delightful patient that has recovered remarkably well from surgery.  His symptoms experienced prior to surgery have resolved and he is gradually and cautiously returning to his normal activities.  This will serve as his final postoperative appointment, however,  I instructed the patient to contact the office if concerns or questions arise following today's appointment.  Additional instructions were given to the patient regarding physical activity and pertinent restrictions at this point in time, which were acknowledged with full understanding by the patient.  It has been a pleasure providing neurosurgical care to this patient and we are grateful for the opportunity.

## 2019-11-14 ENCOUNTER — OFFICE VISIT (OUTPATIENT)
Dept: NEUROSURGERY | Facility: CLINIC | Age: 74
End: 2019-11-14

## 2019-11-14 VITALS — BODY MASS INDEX: 32.11 KG/M2 | HEIGHT: 67 IN

## 2019-11-14 DIAGNOSIS — M51.26 RECURRENT HERNIATION OF LUMBAR DISC: Primary | ICD-10-CM

## 2019-11-14 PROCEDURE — 99213 OFFICE O/P EST LOW 20 MIN: CPT | Performed by: NEUROLOGICAL SURGERY

## 2019-11-14 RX ORDER — METHYLPREDNISOLONE 4 MG/1
TABLET ORAL
Qty: 1 EACH | Refills: 0 | Status: ON HOLD | OUTPATIENT
Start: 2019-11-14 | End: 2019-12-31

## 2019-11-14 NOTE — PROGRESS NOTES
Subjective   Patient ID: Joni Hoffman is a 74 y.o. male is being seen for consultation today at the request of Little Barker MD  Chief Complaint: Left hip and leg pain    History of Present Illness: The patient is a 73-year-old man from [place] with a history of pain in his left hip and leg for 3 weeks.  It began abruptly and spontaneously and was very severe for 2 weeks and has led up someone past week.  It radiates as far as his foot.  The pain is very similar to radiculopathy he had 2 years ago when he had a lumbar discectomy L5-S1 on the left.    Review of Radiographic Studies:  Lumbar MRI scan shows recurrent disc herniation L5-S1 on the left, with prior laminotomy at the site.    The following portions of the patient's history were reviewed, updated as appropriate and approved: allergies, current medications, past family history, past medical history, past social history, past surgical history, review of systems and problem list.  Review of Systems    Objective     NEUROLOGICAL EXAMINATION:      MENTAL STATUS:  Alert and oriented.  Speech intact.  Recent and remote memory intact.      CRANIAL NERVES:  Cranial nerve II:  Visual fields are full.  Cranial nerves III, IV and VI:  PERRLADC.  Extraocular movements are intact.  Nystagmus is not present.  Cranial nerve V:  Facial sensation is intact.  Cranial nerve VII:  Muscles of facial expression reveal no asymmetry.  Cranial nerve VIII:  Hearing is intact.  Cranial nerves IX and X:  Palate elevates symmetrically.  Cranial nerve XI:  Shoulder shrug is intact.  Cranial nerve XII:  Tongue is midline without evidence of atrophy or fasciculation.    MUSCULOSKELETAL: SLR negative bilaterally.    MOTOR: Dorsiflexion and plantarflexion intact bilaterally.    SENSATION: No sensory loss to touch.    REFLEXES:  DTR absent both ankles.    Assessment   Recurrent disc herniation L5-S1 left.       Plan   Medrol Dosepak.  Physical therapy in [place].  Office  follow-up in 3 weeks.       Elbert De Anda MD

## 2019-12-12 ENCOUNTER — OFFICE VISIT (OUTPATIENT)
Dept: NEUROSURGERY | Facility: CLINIC | Age: 74
End: 2019-12-12

## 2019-12-12 VITALS
SYSTOLIC BLOOD PRESSURE: 134 MMHG | HEIGHT: 67 IN | DIASTOLIC BLOOD PRESSURE: 70 MMHG | BODY MASS INDEX: 31.08 KG/M2 | WEIGHT: 198 LBS

## 2019-12-12 DIAGNOSIS — M51.26 RECURRENT HERNIATION OF LUMBAR DISC: Primary | ICD-10-CM

## 2019-12-12 PROCEDURE — 99024 POSTOP FOLLOW-UP VISIT: CPT | Performed by: NEUROLOGICAL SURGERY

## 2019-12-12 NOTE — PROGRESS NOTES
Subjective   Joni Hoffman is a 74 y.o. male who presents for follow up of left hip and leg pain.     The patient is a 74-year-old man from [place] with a history of lumbar discectomy L5-S1 on the left 2 years ago, and the onset of recurrent pain to his left hip and leg 7 weeks ago.  Lumbar MRI scan showed a probable recurrent disc herniation L5-S1 on the left.  He was treated with physical therapy since I saw him on 14 November and there is been no real improvement in symptoms.  He is able to get relief when he standing or walking by bending forward toward his toes and then when he stands up again can walk for several more minutes without the pain recurring.  He cannot use his treadmill now, which he does for exercise to help with his diabetes, because of the left sciatic pain.    The following portions of the patient's history were reviewed, updated as appropriate and approved: allergies, current medications, past family history, past medical history, past social history, past surgical history, review of systems and problem list.     Review of Systems   Constitutional: Negative for activity change, appetite change, chills, diaphoresis, fatigue, fever and unexpected weight change.   HENT: Negative for congestion, dental problem, drooling, ear discharge, ear pain, facial swelling, hearing loss, mouth sores, nosebleeds, postnasal drip, rhinorrhea, sinus pressure, sinus pain, sneezing, sore throat, tinnitus, trouble swallowing and voice change.    Eyes: Negative for photophobia, pain, discharge, redness, itching and visual disturbance.   Respiratory: Negative for apnea, cough, choking, chest tightness, shortness of breath, wheezing and stridor.    Cardiovascular: Negative for chest pain, palpitations and leg swelling.   Gastrointestinal: Negative for abdominal distention, abdominal pain, anal bleeding, blood in stool, constipation, diarrhea, nausea, rectal pain and vomiting.   Endocrine: Negative for cold  intolerance, heat intolerance, polydipsia, polyphagia and polyuria.   Genitourinary: Negative for decreased urine volume, difficulty urinating, dysuria, enuresis, flank pain, frequency, genital sores, hematuria and urgency.   Musculoskeletal: Negative for arthralgias, back pain, gait problem, joint swelling, myalgias, neck pain and neck stiffness.   Skin: Negative for color change, pallor, rash and wound.   Allergic/Immunologic: Negative for environmental allergies, food allergies and immunocompromised state.   Neurological: Negative for dizziness, tremors, seizures, syncope, facial asymmetry, speech difficulty, weakness, light-headedness, numbness and headaches.   Hematological: Negative for adenopathy. Does not bruise/bleed easily.   Psychiatric/Behavioral: Negative for agitation, behavioral problems, confusion, decreased concentration, dysphoric mood, hallucinations, self-injury, sleep disturbance and suicidal ideas. The patient is not nervous/anxious and is not hyperactive.        Objective    NEUROLOGICAL EXAMINATION:    SLR positive left.  No motor or sensory loss.  Absent ankle reflexes bilaterally.    Assessment   Recurrent herniated disc L5-S1 left.       Plan   Lumbar myelogram.  Follow-up after myelography.  Surgical decision will be made based upon the myelographic appearance.       Elbert De Anda MD

## 2019-12-31 ENCOUNTER — APPOINTMENT (OUTPATIENT)
Dept: CT IMAGING | Facility: HOSPITAL | Age: 74
End: 2019-12-31

## 2019-12-31 ENCOUNTER — HOSPITAL ENCOUNTER (OUTPATIENT)
Facility: HOSPITAL | Age: 74
Setting detail: HOSPITAL OUTPATIENT SURGERY
Discharge: HOME | End: 2019-12-31
Attending: RADIOLOGY | Admitting: NEUROLOGICAL SURGERY

## 2019-12-31 VITALS
HEART RATE: 61 BPM | DIASTOLIC BLOOD PRESSURE: 76 MMHG | HEIGHT: 67 IN | SYSTOLIC BLOOD PRESSURE: 136 MMHG | TEMPERATURE: 97.5 F | WEIGHT: 198 LBS | BODY MASS INDEX: 31.08 KG/M2 | OXYGEN SATURATION: 94 %

## 2019-12-31 DIAGNOSIS — M51.26 RECURRENT HERNIATION OF LUMBAR DISC: ICD-10-CM

## 2019-12-31 LAB — GLUCOSE BLDC GLUCOMTR-MCNC: 180 MG/DL (ref 70–130)

## 2019-12-31 PROCEDURE — 82962 GLUCOSE BLOOD TEST: CPT

## 2019-12-31 PROCEDURE — 62304 MYELOGRAPHY LUMBAR INJECTION: CPT | Performed by: RADIOLOGY

## 2019-12-31 PROCEDURE — 0 IOPAMIDOL 41 % SOLUTION: Performed by: RADIOLOGY

## 2019-12-31 PROCEDURE — 72132 CT LUMBAR SPINE W/DYE: CPT

## 2019-12-31 RX ORDER — LIDOCAINE HYDROCHLORIDE 10 MG/ML
INJECTION, SOLUTION EPIDURAL; INFILTRATION; INTRACAUDAL; PERINEURAL AS NEEDED
Status: DISCONTINUED | OUTPATIENT
Start: 2019-12-31 | End: 2019-12-31 | Stop reason: HOSPADM

## 2020-01-02 ENCOUNTER — OFFICE VISIT (OUTPATIENT)
Dept: NEUROSURGERY | Facility: CLINIC | Age: 75
End: 2020-01-02

## 2020-01-02 VITALS — HEIGHT: 67 IN | TEMPERATURE: 96.6 F | BODY MASS INDEX: 31.17 KG/M2 | WEIGHT: 198.6 LBS

## 2020-01-02 DIAGNOSIS — M51.26 RECURRENT HERNIATION OF LUMBAR DISC: Primary | ICD-10-CM

## 2020-01-02 DIAGNOSIS — M51.36 BULGING LUMBAR DISC: ICD-10-CM

## 2020-01-02 PROCEDURE — 99213 OFFICE O/P EST LOW 20 MIN: CPT | Performed by: NEUROLOGICAL SURGERY

## 2020-01-02 NOTE — PROGRESS NOTES
Subjective   Joni Hoffman is a 74 y.o. male who presents for follow up of left hip and leg pain.     The patient has a 3-month history of pain in the left hip and leg in an S1 radicular pattern.  Lumbar myelogram was performed and confirms recurrent disc herniation L5-S1 on the left.    The following portions of the patient's history were reviewed, updated as appropriate and approved: allergies, current medications, past family history, past medical history, past social history, past surgical history, review of systems and problem list.     Review of Systems   Constitutional: Negative for activity change, appetite change, chills, diaphoresis, fatigue, fever and unexpected weight change.   HENT: Negative for congestion, dental problem, drooling, ear discharge, ear pain, facial swelling, hearing loss, mouth sores, nosebleeds, postnasal drip, rhinorrhea, sinus pressure, sinus pain, sneezing, sore throat, tinnitus, trouble swallowing and voice change.    Eyes: Negative for photophobia, pain, discharge, redness, itching and visual disturbance.   Respiratory: Negative for apnea, cough, choking, chest tightness, shortness of breath, wheezing and stridor.    Cardiovascular: Negative for chest pain, palpitations and leg swelling.   Gastrointestinal: Negative for abdominal distention, abdominal pain, anal bleeding, blood in stool, constipation, diarrhea, nausea, rectal pain and vomiting.   Endocrine: Negative for cold intolerance, heat intolerance, polydipsia, polyphagia and polyuria.   Genitourinary: Negative for decreased urine volume, difficulty urinating, dysuria, enuresis, flank pain, frequency, genital sores, hematuria and urgency.   Musculoskeletal: Negative for arthralgias, back pain, gait problem, joint swelling, myalgias, neck pain and neck stiffness.   Skin: Negative for color change, pallor, rash and wound.   Allergic/Immunologic: Negative for environmental allergies, food allergies and immunocompromised  state.   Neurological: Negative for dizziness, tremors, seizures, syncope, facial asymmetry, speech difficulty, weakness, light-headedness, numbness and headaches.   Hematological: Negative for adenopathy. Does not bruise/bleed easily.   Psychiatric/Behavioral: Negative for agitation, behavioral problems, confusion, decreased concentration, dysphoric mood, hallucinations, self-injury, sleep disturbance and suicidal ideas. The patient is not nervous/anxious and is not hyperactive.        Objective    NEUROLOGICAL EXAMINATION:    SLR positive left 45 degrees.  Left ankle jerk reduced.  Motor and sensory intact in the left lower extremity.    Assessment   Recurrent lumbar disc herniation L5-S1 left.       Plan   Lumbar discectomy L5-S1 left.       Elbert De Anda MD

## 2020-01-21 PROBLEM — M51.36 BULGING LUMBAR DISC: Status: ACTIVE | Noted: 2020-01-21

## 2020-01-21 PROBLEM — M51.369 BULGING LUMBAR DISC: Status: ACTIVE | Noted: 2020-01-21

## 2020-01-27 ENCOUNTER — PREP FOR SURGERY (OUTPATIENT)
Dept: OTHER | Facility: HOSPITAL | Age: 75
End: 2020-01-27

## 2020-01-27 DIAGNOSIS — M51.26 RECURRENT HERNIATION OF LUMBAR DISC: Primary | ICD-10-CM

## 2020-01-27 RX ORDER — SODIUM CHLORIDE AND POTASSIUM CHLORIDE 150; 900 MG/100ML; MG/100ML
50 INJECTION, SOLUTION INTRAVENOUS CONTINUOUS
Status: CANCELLED | OUTPATIENT
Start: 2020-01-27

## 2020-01-27 RX ORDER — CHLORHEXIDINE GLUCONATE 4 G/100ML
SOLUTION TOPICAL
Qty: 120 ML | Refills: 0 | Status: SHIPPED | OUTPATIENT
Start: 2020-01-27 | End: 2020-02-19 | Stop reason: HOSPADM

## 2020-01-27 RX ORDER — IBUPROFEN 800 MG/1
800 TABLET ORAL ONCE
Status: CANCELLED | OUTPATIENT
Start: 2020-01-27 | End: 2020-01-27

## 2020-01-27 RX ORDER — HYDROCODONE BITARTRATE AND ACETAMINOPHEN 7.5; 325 MG/1; MG/1
1 TABLET ORAL ONCE
Status: CANCELLED | OUTPATIENT
Start: 2020-01-27 | End: 2020-01-27

## 2020-01-27 RX ORDER — SODIUM CHLORIDE 0.9 % (FLUSH) 0.9 %
3 SYRINGE (ML) INJECTION EVERY 12 HOURS SCHEDULED
Status: CANCELLED | OUTPATIENT
Start: 2020-01-27

## 2020-01-27 RX ORDER — ACETAMINOPHEN 325 MG/1
650 TABLET ORAL ONCE
Status: CANCELLED | OUTPATIENT
Start: 2020-01-27 | End: 2020-01-27

## 2020-02-12 ENCOUNTER — APPOINTMENT (OUTPATIENT)
Dept: PREADMISSION TESTING | Facility: HOSPITAL | Age: 75
End: 2020-02-12

## 2020-02-12 VITALS — BODY MASS INDEX: 31.52 KG/M2 | WEIGHT: 200.8 LBS | HEIGHT: 67 IN

## 2020-02-12 DIAGNOSIS — M51.26 RECURRENT HERNIATION OF LUMBAR DISC: ICD-10-CM

## 2020-02-12 LAB
ANION GAP SERPL CALCULATED.3IONS-SCNC: 14 MMOL/L (ref 5–15)
BUN BLD-MCNC: 23 MG/DL (ref 8–23)
BUN/CREAT SERPL: 20.9 (ref 7–25)
CALCIUM SPEC-SCNC: 9.4 MG/DL (ref 8.6–10.5)
CHLORIDE SERPL-SCNC: 95 MMOL/L (ref 98–107)
CO2 SERPL-SCNC: 28 MMOL/L (ref 22–29)
CREAT BLD-MCNC: 1.1 MG/DL (ref 0.76–1.27)
DEPRECATED RDW RBC AUTO: 42.8 FL (ref 37–54)
ERYTHROCYTE [DISTWIDTH] IN BLOOD BY AUTOMATED COUNT: 12.5 % (ref 12.3–15.4)
GFR SERPL CREATININE-BSD FRML MDRD: 65 ML/MIN/1.73
GLUCOSE BLD-MCNC: 278 MG/DL (ref 65–99)
HCT VFR BLD AUTO: 47 % (ref 37.5–51)
HGB BLD-MCNC: 15.7 G/DL (ref 13–17.7)
MCH RBC QN AUTO: 30.7 PG (ref 26.6–33)
MCHC RBC AUTO-ENTMCNC: 33.4 G/DL (ref 31.5–35.7)
MCV RBC AUTO: 92 FL (ref 79–97)
MRSA DNA SPEC QL NAA+PROBE: NEGATIVE
PLATELET # BLD AUTO: 193 10*3/MM3 (ref 140–450)
PMV BLD AUTO: 10.1 FL (ref 6–12)
POTASSIUM BLD-SCNC: 4 MMOL/L (ref 3.5–5.2)
RBC # BLD AUTO: 5.11 10*6/MM3 (ref 4.14–5.8)
SODIUM BLD-SCNC: 137 MMOL/L (ref 136–145)
WBC NRBC COR # BLD: 12.7 10*3/MM3 (ref 3.4–10.8)

## 2020-02-12 PROCEDURE — 85027 COMPLETE CBC AUTOMATED: CPT | Performed by: NEUROLOGICAL SURGERY

## 2020-02-12 PROCEDURE — 93005 ELECTROCARDIOGRAM TRACING: CPT

## 2020-02-12 PROCEDURE — 93010 ELECTROCARDIOGRAM REPORT: CPT | Performed by: INTERNAL MEDICINE

## 2020-02-12 PROCEDURE — 80048 BASIC METABOLIC PNL TOTAL CA: CPT | Performed by: NEUROLOGICAL SURGERY

## 2020-02-12 PROCEDURE — 87641 MR-STAPH DNA AMP PROBE: CPT | Performed by: NEUROLOGICAL SURGERY

## 2020-02-12 PROCEDURE — 36415 COLL VENOUS BLD VENIPUNCTURE: CPT

## 2020-02-12 RX ORDER — DOXYCYCLINE 100 MG/1
100 CAPSULE ORAL 2 TIMES DAILY
Status: ON HOLD | COMMUNITY
Start: 2020-02-10 | End: 2020-02-19

## 2020-02-12 NOTE — PAT
Patient instructed to drink 20 ounces (or until full) of Gatorade and it needs to be completed 1 hour before given arrival time for procedure (NO RED Gatorade)    Patient verbalized understanding.    Patient to apply Chlorhexadine wipes  to surgical area (as instructed) the night before procedure and the AM of procedure. Wipes provided.    Bactroban and Chlorhexidine Prescription given during PAT visit, as well as written and verbal instructions given to patient during PAT visit.  Patient/family also instructed to complete skin prep checklist and return the checklist on the day of surgery to preoperative staff.  Patient/family verbalized understanding.    Cardiac clearance from Dr Gates office printed and placed on chart

## 2020-02-12 NOTE — DISCHARGE INSTRUCTIONS
The following information and instructions were given:    Do not eat, drink, smoke or chew gum after midnight the night before surgery. This also includes no mints.  Take all routine, prescribed medications including heart and blood pressure medicines with a sip of water unless otherwise instructed by your physician.   Do NOT take diabetic medication unless instructed by your physician.    DO NOT shave for two days before your procedure.  Do not wear makeup.      DO NOT wear fingernail polish (gel/regular) and/or acrylic/artificial nails on the day of surgery.   If you have had a recent manicure and would rather not remove polish or artificial nails, then the minimum requirement is that the polish/artificial nails must be removed from the middle finger on each hand.      If you are having surgery/procedure on an upper extremity, then fingernail polish/artificial fingernails must be removed for surgery.  NO EXCEPTIONS.      If you are having surgery/procedure on a lower extremity, then toenail polish on both extremities must be removed for surgery.  NO EXCEPTIONS.    Remove all jewelry (advise to go to jeweler if unable to remove).  Jewelry, especially rings, can no longer be taped for surgery.    Leave anything you consider valuable at home.    Leave your suitcase in the car until after your surgery.    Bring the following with you the day of your procedure (when applicable)       -picture ID and insurance cards       -Co-pay/deductible required by insurance       -Medications in the original bottles (not a list) including all over-the-counter medications if not brought to PAT       -Copy of advance directive, living will or power of  documents if not brought to PAT       -CPAP or BIPAP mask and tubing (do not bring machine)       -Skin prep instruction(s) sheet       -PAT Pass    Education booklet, brochure, handout or binder related to procedure given to patient.  Education booklet also includes general  information related to their recovery that mentions signs and symptoms of infection and when to call the doctor.    When applicable, an ERAS booklet was given to patient.    Pain Control After Surgery handout given to patient.    Respirex use (handout given to patient) and pneumonia prevention education provided.    Signs and Symptoms of infection were discussed with patient in Pre Admission testing.  Patient instructed to call their doctor if any of the following symptoms are noted during recovery:  fever of 100.4 F or higher, incision that is warm or has increasing bleeding, redness, or drainage.    DVT Prevention instructions given verbally during Pre Admission Testing appointment that stressed the importance of ambulation to improve blood circulation.  Also encouraged patient to perform foot exercises when in bed and that the application of a sequential device might be applied to lower extremities to improve circulation.      Please apply Chlorhexadine wipes to surgical area (if instructed) the night before procedure and the AM of procedure and document date/time of applications on skin prep instruction sheet.        Bactroban and Chlorhexidine Prescription given during PAT visit, as well as written and verbal instructions given to patient during PAT visit.  Patient/family also instructed to complete skin prep checklist and return the checklist on the day of surgery to preoperative staff.  Patient/family verbalized understanding.

## 2020-02-19 ENCOUNTER — ANESTHESIA EVENT (OUTPATIENT)
Dept: PERIOP | Facility: HOSPITAL | Age: 75
End: 2020-02-19

## 2020-02-19 ENCOUNTER — HOSPITAL ENCOUNTER (OUTPATIENT)
Facility: HOSPITAL | Age: 75
Setting detail: HOSPITAL OUTPATIENT SURGERY
Discharge: HOME | End: 2020-02-19
Attending: NEUROLOGICAL SURGERY | Admitting: NEUROLOGICAL SURGERY

## 2020-02-19 ENCOUNTER — ANESTHESIA (OUTPATIENT)
Dept: PERIOP | Facility: HOSPITAL | Age: 75
End: 2020-02-19

## 2020-02-19 ENCOUNTER — APPOINTMENT (OUTPATIENT)
Dept: GENERAL RADIOLOGY | Facility: HOSPITAL | Age: 75
End: 2020-02-19

## 2020-02-19 VITALS
DIASTOLIC BLOOD PRESSURE: 70 MMHG | WEIGHT: 200 LBS | RESPIRATION RATE: 18 BRPM | OXYGEN SATURATION: 92 % | HEART RATE: 70 BPM | HEIGHT: 67 IN | BODY MASS INDEX: 31.39 KG/M2 | TEMPERATURE: 98 F | SYSTOLIC BLOOD PRESSURE: 118 MMHG

## 2020-02-19 DIAGNOSIS — M51.26 RECURRENT HERNIATION OF LUMBAR DISC: Primary | ICD-10-CM

## 2020-02-19 LAB
GLUCOSE BLDC GLUCOMTR-MCNC: 162 MG/DL (ref 70–130)
POTASSIUM BLD-SCNC: 3.8 MMOL/L (ref 3.5–5.2)

## 2020-02-19 PROCEDURE — 82962 GLUCOSE BLOOD TEST: CPT

## 2020-02-19 PROCEDURE — 25010000002 DEXAMETHASONE PER 1 MG: Performed by: NURSE ANESTHETIST, CERTIFIED REGISTERED

## 2020-02-19 PROCEDURE — 76000 FLUOROSCOPY <1 HR PHYS/QHP: CPT

## 2020-02-19 PROCEDURE — S0260 H&P FOR SURGERY: HCPCS | Performed by: NEUROLOGICAL SURGERY

## 2020-02-19 PROCEDURE — 25010000002 VANCOMYCIN 10 G RECONSTITUTED SOLUTION: Performed by: NEUROLOGICAL SURGERY

## 2020-02-19 PROCEDURE — 25010000002 NEOSTIGMINE 10 MG/10ML SOLUTION: Performed by: NURSE ANESTHETIST, CERTIFIED REGISTERED

## 2020-02-19 PROCEDURE — 25010000002 FENTANYL CITRATE (PF) 100 MCG/2ML SOLUTION: Performed by: NURSE ANESTHETIST, CERTIFIED REGISTERED

## 2020-02-19 PROCEDURE — 25010000002 ONDANSETRON PER 1 MG: Performed by: NURSE ANESTHETIST, CERTIFIED REGISTERED

## 2020-02-19 PROCEDURE — 84132 ASSAY OF SERUM POTASSIUM: CPT | Performed by: ANESTHESIOLOGY

## 2020-02-19 PROCEDURE — 25010000002 PROPOFOL 10 MG/ML EMULSION: Performed by: NURSE ANESTHETIST, CERTIFIED REGISTERED

## 2020-02-19 PROCEDURE — 25010000003 LIDOCAINE 1 % SOLUTION: Performed by: NURSE ANESTHETIST, CERTIFIED REGISTERED

## 2020-02-19 PROCEDURE — 63030 LAMOT DCMPRN NRV RT 1 LMBR: CPT | Performed by: NEUROLOGICAL SURGERY

## 2020-02-19 RX ORDER — HYDROCODONE BITARTRATE AND ACETAMINOPHEN 7.5; 325 MG/1; MG/1
1 TABLET ORAL ONCE
Status: DISCONTINUED | OUTPATIENT
Start: 2020-02-19 | End: 2020-02-19 | Stop reason: HOSPADM

## 2020-02-19 RX ORDER — BUPIVACAINE HYDROCHLORIDE AND EPINEPHRINE 2.5; 5 MG/ML; UG/ML
INJECTION, SOLUTION EPIDURAL; INFILTRATION; INTRACAUDAL; PERINEURAL AS NEEDED
Status: DISCONTINUED | OUTPATIENT
Start: 2020-02-19 | End: 2020-02-19 | Stop reason: HOSPADM

## 2020-02-19 RX ORDER — DEXAMETHASONE SODIUM PHOSPHATE 4 MG/ML
INJECTION, SOLUTION INTRA-ARTICULAR; INTRALESIONAL; INTRAMUSCULAR; INTRAVENOUS; SOFT TISSUE AS NEEDED
Status: DISCONTINUED | OUTPATIENT
Start: 2020-02-19 | End: 2020-02-19 | Stop reason: SURG

## 2020-02-19 RX ORDER — ONDANSETRON 2 MG/ML
INJECTION INTRAMUSCULAR; INTRAVENOUS AS NEEDED
Status: DISCONTINUED | OUTPATIENT
Start: 2020-02-19 | End: 2020-02-19 | Stop reason: SURG

## 2020-02-19 RX ORDER — MAGNESIUM HYDROXIDE 1200 MG/15ML
LIQUID ORAL AS NEEDED
Status: DISCONTINUED | OUTPATIENT
Start: 2020-02-19 | End: 2020-02-19 | Stop reason: HOSPADM

## 2020-02-19 RX ORDER — IBUPROFEN 800 MG/1
800 TABLET ORAL ONCE
Status: DISCONTINUED | OUTPATIENT
Start: 2020-02-19 | End: 2020-02-19 | Stop reason: HOSPADM

## 2020-02-19 RX ORDER — ONDANSETRON 2 MG/ML
4 INJECTION INTRAMUSCULAR; INTRAVENOUS ONCE AS NEEDED
Status: DISCONTINUED | OUTPATIENT
Start: 2020-02-19 | End: 2020-02-19 | Stop reason: HOSPADM

## 2020-02-19 RX ORDER — ACETAMINOPHEN 325 MG/1
650 TABLET ORAL ONCE
Status: COMPLETED | OUTPATIENT
Start: 2020-02-19 | End: 2020-02-19

## 2020-02-19 RX ORDER — GLYCOPYRROLATE 0.2 MG/ML
INJECTION INTRAMUSCULAR; INTRAVENOUS AS NEEDED
Status: DISCONTINUED | OUTPATIENT
Start: 2020-02-19 | End: 2020-02-19 | Stop reason: SURG

## 2020-02-19 RX ORDER — SODIUM CHLORIDE, SODIUM LACTATE, POTASSIUM CHLORIDE, CALCIUM CHLORIDE 600; 310; 30; 20 MG/100ML; MG/100ML; MG/100ML; MG/100ML
9 INJECTION, SOLUTION INTRAVENOUS CONTINUOUS PRN
Status: DISCONTINUED | OUTPATIENT
Start: 2020-02-19 | End: 2020-02-19 | Stop reason: HOSPADM

## 2020-02-19 RX ORDER — SODIUM CHLORIDE 0.9 % (FLUSH) 0.9 %
10 SYRINGE (ML) INJECTION AS NEEDED
Status: DISCONTINUED | OUTPATIENT
Start: 2020-02-19 | End: 2020-02-19 | Stop reason: HOSPADM

## 2020-02-19 RX ORDER — FENTANYL CITRATE 50 UG/ML
50 INJECTION, SOLUTION INTRAMUSCULAR; INTRAVENOUS
Status: DISCONTINUED | OUTPATIENT
Start: 2020-02-19 | End: 2020-02-19 | Stop reason: HOSPADM

## 2020-02-19 RX ORDER — HYDROCODONE BITARTRATE AND ACETAMINOPHEN 5; 325 MG/1; MG/1
1 TABLET ORAL ONCE AS NEEDED
Status: DISCONTINUED | OUTPATIENT
Start: 2020-02-19 | End: 2020-02-19 | Stop reason: HOSPADM

## 2020-02-19 RX ORDER — HYDROCODONE BITARTRATE AND ACETAMINOPHEN 5; 325 MG/1; MG/1
1 TABLET ORAL EVERY 6 HOURS PRN
Qty: 30 TABLET | Refills: 0 | Status: SHIPPED | OUTPATIENT
Start: 2020-02-19 | End: 2021-09-20

## 2020-02-19 RX ORDER — FAMOTIDINE 20 MG/1
20 TABLET, FILM COATED ORAL
Status: DISCONTINUED | OUTPATIENT
Start: 2020-02-19 | End: 2020-02-19 | Stop reason: HOSPADM

## 2020-02-19 RX ORDER — ROCURONIUM BROMIDE 10 MG/ML
INJECTION, SOLUTION INTRAVENOUS AS NEEDED
Status: DISCONTINUED | OUTPATIENT
Start: 2020-02-19 | End: 2020-02-19 | Stop reason: SURG

## 2020-02-19 RX ORDER — SODIUM CHLORIDE 0.9 % (FLUSH) 0.9 %
10 SYRINGE (ML) INJECTION EVERY 12 HOURS SCHEDULED
Status: DISCONTINUED | OUTPATIENT
Start: 2020-02-19 | End: 2020-02-19 | Stop reason: HOSPADM

## 2020-02-19 RX ORDER — LIDOCAINE HYDROCHLORIDE 10 MG/ML
0.5 INJECTION, SOLUTION EPIDURAL; INFILTRATION; INTRACAUDAL; PERINEURAL ONCE AS NEEDED
Status: COMPLETED | OUTPATIENT
Start: 2020-02-19 | End: 2020-02-19

## 2020-02-19 RX ORDER — LIDOCAINE HYDROCHLORIDE 10 MG/ML
INJECTION, SOLUTION INFILTRATION; PERINEURAL AS NEEDED
Status: DISCONTINUED | OUTPATIENT
Start: 2020-02-19 | End: 2020-02-19 | Stop reason: SURG

## 2020-02-19 RX ORDER — NEOSTIGMINE METHYLSULFATE 1 MG/ML
INJECTION, SOLUTION INTRAVENOUS AS NEEDED
Status: DISCONTINUED | OUTPATIENT
Start: 2020-02-19 | End: 2020-02-19 | Stop reason: SURG

## 2020-02-19 RX ORDER — FENTANYL CITRATE 50 UG/ML
INJECTION, SOLUTION INTRAMUSCULAR; INTRAVENOUS AS NEEDED
Status: DISCONTINUED | OUTPATIENT
Start: 2020-02-19 | End: 2020-02-19 | Stop reason: SURG

## 2020-02-19 RX ORDER — PROPOFOL 10 MG/ML
VIAL (ML) INTRAVENOUS AS NEEDED
Status: DISCONTINUED | OUTPATIENT
Start: 2020-02-19 | End: 2020-02-19 | Stop reason: SURG

## 2020-02-19 RX ORDER — HYDROMORPHONE HYDROCHLORIDE 1 MG/ML
0.5 INJECTION, SOLUTION INTRAMUSCULAR; INTRAVENOUS; SUBCUTANEOUS
Status: DISCONTINUED | OUTPATIENT
Start: 2020-02-19 | End: 2020-02-19 | Stop reason: HOSPADM

## 2020-02-19 RX ADMIN — GLYCOPYRROLATE 0.4 MG: 0.2 INJECTION, SOLUTION INTRAMUSCULAR; INTRAVENOUS at 12:05

## 2020-02-19 RX ADMIN — VANCOMYCIN HYDROCHLORIDE 1250 MG: 10 INJECTION, POWDER, LYOPHILIZED, FOR SOLUTION INTRAVENOUS at 08:28

## 2020-02-19 RX ADMIN — FENTANYL CITRATE 50 MCG: 50 INJECTION, SOLUTION INTRAMUSCULAR; INTRAVENOUS at 11:04

## 2020-02-19 RX ADMIN — LIDOCAINE HYDROCHLORIDE 0.5 ML: 10 INJECTION, SOLUTION EPIDURAL; INFILTRATION; INTRACAUDAL; PERINEURAL at 08:17

## 2020-02-19 RX ADMIN — EPHEDRINE SULFATE 5 MG: 50 INJECTION INTRAMUSCULAR; INTRAVENOUS; SUBCUTANEOUS at 11:20

## 2020-02-19 RX ADMIN — PROPOFOL 20 MG: 10 INJECTION, EMULSION INTRAVENOUS at 11:06

## 2020-02-19 RX ADMIN — DEXAMETHASONE SODIUM PHOSPHATE 4 MG: 4 INJECTION, SOLUTION INTRAMUSCULAR; INTRAVENOUS at 11:20

## 2020-02-19 RX ADMIN — SODIUM CHLORIDE, POTASSIUM CHLORIDE, SODIUM LACTATE AND CALCIUM CHLORIDE: 600; 310; 30; 20 INJECTION, SOLUTION INTRAVENOUS at 12:07

## 2020-02-19 RX ADMIN — ROCURONIUM BROMIDE 30 MG: 10 INJECTION INTRAVENOUS at 11:05

## 2020-02-19 RX ADMIN — ONDANSETRON 4 MG: 2 INJECTION INTRAMUSCULAR; INTRAVENOUS at 11:59

## 2020-02-19 RX ADMIN — LIDOCAINE HYDROCHLORIDE 50 MG: 10 INJECTION, SOLUTION INFILTRATION; PERINEURAL at 11:04

## 2020-02-19 RX ADMIN — PROPOFOL 20 MG: 10 INJECTION, EMULSION INTRAVENOUS at 12:06

## 2020-02-19 RX ADMIN — EPHEDRINE SULFATE 10 MG: 50 INJECTION INTRAMUSCULAR; INTRAVENOUS; SUBCUTANEOUS at 12:00

## 2020-02-19 RX ADMIN — FENTANYL CITRATE 50 MCG: 50 INJECTION, SOLUTION INTRAMUSCULAR; INTRAVENOUS at 12:18

## 2020-02-19 RX ADMIN — NEOSTIGMINE METHYLSULFATE 2 MG: 1 INJECTION, SOLUTION INTRAVENOUS at 12:05

## 2020-02-19 RX ADMIN — SODIUM CHLORIDE, POTASSIUM CHLORIDE, SODIUM LACTATE AND CALCIUM CHLORIDE 9 ML/HR: 600; 310; 30; 20 INJECTION, SOLUTION INTRAVENOUS at 08:17

## 2020-02-19 RX ADMIN — PROPOFOL 100 MG: 10 INJECTION, EMULSION INTRAVENOUS at 11:04

## 2020-02-19 RX ADMIN — ACETAMINOPHEN 650 MG: 325 TABLET ORAL at 08:19

## 2020-02-19 RX ADMIN — EPHEDRINE SULFATE 5 MG: 50 INJECTION INTRAMUSCULAR; INTRAVENOUS; SUBCUTANEOUS at 11:29

## 2020-02-19 NOTE — ANESTHESIA PROCEDURE NOTES
Airway  Urgency: elective    Date/Time: 2/19/2020 11:08 AM  Airway not difficult    General Information and Staff    Patient location during procedure: OR  CRNA: Suyapa Carmichael CRNA    Indications and Patient Condition  Indications for airway management: airway protection    Preoxygenated: yes  MILS not maintained throughout  Mask difficulty assessment: 1 - vent by mask    Final Airway Details  Final airway type: endotracheal airway      Successful airway: ETT  Cuffed: yes   Successful intubation technique: direct laryngoscopy  Facilitating devices/methods: intubating stylet  Endotracheal tube insertion site: oral  Blade: Baldo  Blade size: 3  ETT size (mm): 7.5  Cormack-Lehane Classification: grade I - full view of glottis  Placement verified by: chest auscultation and capnometry   Measured from: lips  ETT/EBT  to lips (cm): 22  Number of attempts at approach: 1  Assessment: lips, teeth, and gum same as pre-op and atraumatic intubation    Additional Comments  Negative epigastric sounds, Breath sound equal bilaterally with symmetric chest rise and fall

## 2020-02-19 NOTE — OP NOTE
OPERATIVE REPORT    DATE OF OPERATION: 2/19/2020    PREOPERATIVE DIAGNOSIS: Recurrent herniated disc L5-S1 left    POSTOPERATIVE DIAGNOSIS: Same    PROCEDURE PERFORMED: Lumbar laminotomy discectomy L5-S1 left    SURGEON: Elbert De Anda MD    ASSISTANT: Abbey Goss PA-C    INDICATIONS: The patient had a lumbar discectomy L5-S1 left 2 years ago with good relief of pain then recurrent pain several months ago.  Lumbar myelogram showed recurrent disc herniation under the S1 nerve root at the surgical site L5-S1 on the left.    DESCRIPTON OF PROCEDURE: Surgery was performed under general anesthesia in the prone position on the laminectomy frame.  The back was prepared sterilely and draped.  The left lower L5 lamina was identified with a spinal needle and an AP fluoroscopic image.  A short skin incision was made and a guidewire and dilators were used to place a 6 cm length x 20 mm width tubular retractor.  AP fluoroscopy confirmed this to be the L5-S1 level on the left.    A high-speed drill was used to perform a laminotomy by enlarging the laminotomy more cranially and laterally to reach the floor the lateral recess.  A 2 and 3 mm Kerrison punch was used to remove the scar and remaining thin bone margin to expose the floor the lateral recess and the dura of the S1 nerve root sleeve.  A straight curette was used to initially separate the dura from the underlying floor the lateral recess, followed by a reverse cup curette which was swept beneath the nerve root from caudally to cranially  any scar attachment from the dura to the disc.  A nerve root retractor was placed over the nerve root sleeve for exposure of the disc.  A #15 scalpel blade was used to open the posterior disc annulus lateral to the S1 nerve root. Discectomy was performed using micro and medium straight, upward, and reversed pituitary rongeurs, removing the herniated disc component and performing a limited nuclear discectomy.  Following  discectomy the dura, nerve root and epidural space were inspected to be sure decompression was complete and no disc fragments remained.     The nerve root retractor was removed and the laminotomy site was irrigated to confirm complete hemostasis. Gelfoam with Depo-Medrol was placed over the laminotomy site.  The tubular retractor was removed.  Marcaine was injected in the paraspinous muscle.  Subcuticular Vicryl closure was performed and glue was applied to the skin.  Blood loss was estimated at 20    mL.    Elbert De Anda M.D.

## 2020-02-19 NOTE — H&P
Pre-Op H&P  Joni Hoffman  [id number]  1945    Chief complaint: Left hip and leg pain    HPI:    Patient is a 74 y.o.male who presents with a 3-month history of pain in the left hip and leg in an S1 radicular pattern.  Lumbar myelogram was performed and confirms recurrent disc herniation L5-S1 on the left. Conservative treatment has failed to provide significant relief. Surgical intervention is recommended and he is agreeable. He is here today for a lumbar discectomy left L5-S1.     Review of Systems:  General ROS: negative for chills, fever or skin lesions;  No changes since last office visit  Cardiovascular ROS: no chest pain or dyspnea on exertion; +CAD- s/p MI 12/31/19 cardiac cath stents x 2, +HTN, +dyslipidemia  Respiratory ROS: no cough, shortness of breath, or wheezing; former cigarette smoker (1 ppd x 25 years)- quit 1985  Endocrine ROS: +NIDDM    Allergies:   Allergies   Allergen Reactions   • Hydrocodone-Acetaminophen GI Intolerance   • Penicillins Rash       Home Meds:    No current facility-administered medications on file prior to encounter.      Current Outpatient Medications on File Prior to Encounter   Medication Sig Dispense Refill   • aspirin 81 MG EC tablet Take 81 mg by mouth Daily.     • atenolol (TENORMIN) 25 MG tablet Take 25 mg by mouth Daily With Breakfast.  3   • famotidine (PEPCID) 10 MG tablet Take 10 mg by mouth Every Morning.     • glimepiride (AMARYL) 4 MG tablet Take 4 mg by mouth 2 (Two) Times a Day. Takes 4 mg in the am and 2 mg in the PM  1   • levothyroxine (SYNTHROID, LEVOTHROID) 125 MCG tablet Take 125 mcg by mouth Daily.  1   • mometasone (NASONEX) 50 MCG/ACT nasal spray 2 sprays into the nostril(s) as directed by provider Every Morning.     • montelukast (SINGULAIR) 10 MG tablet Take 10 mg by mouth Every Night.     • rosuvastatin (CRESTOR) 10 MG tablet Take 10 mg by mouth Every Night.     • trandolapril (MAVIK) 4 MG tablet Take 4 mg by mouth Every Morning.  3   •  "triamterene-hydrochlorothiazide (MAXZIDE-25) 37.5-25 MG per tablet Take 1 tablet by mouth Every Morning.  1   • [DISCONTINUED] cetirizine (zyrTEC) 10 MG tablet Take 10 mg by mouth every night at bedtime.         PMH:   Past Medical History:   Diagnosis Date   • Acid indigestion     related to meds   • Arthritis    • Coronary artery disease     s/p 2 stents after MI    • Diabetes mellitus (CMS/HCC)     po meds; checks blood sugars at home run    • Disease of thyroid gland     hypothyroidism    • Hearing loss     uses hearing aids    • History of kidney stones     passed   • Hypertension    • Lumbar back pain    • MI, old     2 stents inserted   • Wears glasses    • Wears hearing aid      PSH:    Past Surgical History:   Procedure Laterality Date   • APPENDECTOMY     • CATARACT EXTRACTION WITH INTRAOCULAR LENS IMPLANT Bilateral    • COLONOSCOPY     • CORONARY STENT PLACEMENT  2000    x2   • INTERVENTIONAL RADIOLOGY PROCEDURE N/A 2019    Procedure: myelogram lumbar spine;  Surgeon: Karan Pennington MD;  Location:  [facility];  Service: Interventional Radiology   • LUMBAR DISCECTOMY Left 10/27/2017    Procedure: LUMBAR DISCECTOMY L5-S1 LEFT ;  Surgeon: Elbert De Anda MD;  Location:  [facility];  Service:    • NASAL POLYP SURGERY         Social History:   Tobacco:   Social History     Tobacco Use   Smoking Status Former Smoker   • Packs/day: 1.00   • Years: 25.00   • Pack years: 25.00   • Types: Cigarettes   • Last attempt to quit:    • Years since quittin.1   Smokeless Tobacco Current User   • Types: Chew   Tobacco Comment    hx of cigarette use for 25 years and chewed about 25 years (currently chews on occasion)       Alcohol:     Social History     Substance and Sexual Activity   Alcohol Use No       Vitals:           /64 (BP Location: Right arm, Patient Position: Lying)   Pulse 59   Temp 97 °F (36.1 °C) (Temporal)   Resp 18   Ht 170.2 cm (67\")   Wt 90.7 kg " (200 lb)   SpO2 96%   BMI 31.32 kg/m²     Physical Exam:  General Appearance:    Alert, cooperative, no distress, appears stated age   Head:    Normocephalic, without obvious abnormality, atraumatic   Lungs:     Clear to auscultation bilaterally, respirations unlabored    Heart:   Regular rate and rhythm, S1 and S2 normal, no murmur, rub    or gallop    Abdomen:    Soft, non-tender, +bowel sounds   Breast Exam:    deferred   Genitalia:    deferred   Extremities:   Extremities normal, atraumatic, no cyanosis or edema   Skin:   Skin color, texture, turgor normal, no rashes or lesions   Neurologic:   Grossly intact   Results Review  LABS:  Lab Results   Component Value Date    WBC 12.70 (H) 02/12/2020    HGB 15.7 02/12/2020    HCT 47.0 02/12/2020    MCV 92.0 02/12/2020     02/12/2020    GLUCOSE 278 (H) 02/12/2020    BUN 23 02/12/2020    CREATININE 1.10 02/12/2020    EGFRIFNONA 65 02/12/2020     02/12/2020    K 4.0 02/12/2020    CL 95 (L) 02/12/2020    CO2 28.0 02/12/2020    CALCIUM 9.4 02/12/2020    ALBUMIN 4.30 10/23/2017    AST 17 10/23/2017    ALT 28 10/23/2017    BILITOT 0.4 10/23/2017       RADIOLOGY:  Imaging Results (Last 72 Hours)     ** No results found for the last 72 hours. **          I reviewed the patient's new clinical results.     *Pre-op serum K+ pending  *Pre-op glucose= 162    2/12/20 ECG: reviewed-sinus rhythm with 1st degree AV block, otherwise normal ECG. Per Dr. Rowland,when compared with ECG from 10/23/17, no significant change was found.    *1/17/20 Cardiac clearance received from Dr. Dill. Patient considered to be at acceptable cardiac risk. Letter in Epic under Media.    Cancer Staging (if applicable)  Cancer Patient: __ yes _X_no __unknown; If yes, clinical stage T:__ N:__M:__, stage group or __N/A    Impression: Recurrent lumbar disc herniation left L5-S1     Plan: Lumbar discectomy left L5-S1      Michelle Piña, APRN   2/19/2020   8:32 AM

## 2020-02-19 NOTE — BRIEF OP NOTE
LUMBAR DISCECTOMY MICRO ENDOSCOPIC  Progress Note    Joni Hoffman  2/19/2020    Pre-op Diagnosis:   Recurrent herniation of lumbar disc [M51.26]  Bulging lumbar disc [M51.26]       Post-Op Diagnosis Codes:     * Recurrent herniation of lumbar disc [M51.26]     * Bulging lumbar disc [M51.26]    Procedure/CPT® Codes:      Procedure(s):  RE-DO LUMBAR DISCECTOMY L5-S1 LEFT    Surgeon(s):  Elbert De Anda MD    Anesthesia: General    Staff:   Circulator: Altagracia Nunez RN; Jaqui Bojorquez RN  Radiology Technologist: Adam Mendoza RT  Scrub Person: Shelby Patel; Emma Kendall  Assistant: Abbey Goss PA-C    Estimated Blood Loss: minimal    Urine Voided: * No values recorded between 2/19/2020 10:59 AM and 2/19/2020 12:03 PM *    Specimens:                None          Drains: * No LDAs found *    Findings: Recurrent disc herniation L5-S1 left    Complications: None      Elbert De Anda MD     Date: 2/19/2020  Time: 12:03 PM

## 2020-02-19 NOTE — ANESTHESIA POSTPROCEDURE EVALUATION
Patient: Joni Hoffman    Procedure Summary     Date:  02/19/20 Room / Location:   [facility]  [facility]    Anesthesia Start:  1059 Anesthesia Stop:      Procedure:  RE-DO LUMBAR DISCECTOMY L5-S1 LEFT (Left Spine Lumbar) Diagnosis:       Recurrent herniation of lumbar disc      Bulging lumbar disc      (Recurrent herniation of lumbar disc [M51.26])      (Bulging lumbar disc [M51.26])    Surgeon:  Elbert De Anda MD Provider:  William Vargas MD    Anesthesia Type:  general ASA Status:  3          Anesthesia Type: general    Vitals  Vitals Value Taken Time   /60 2/19/2020 12:16 PM   Temp     Pulse 63 2/19/2020 12:19 PM   Resp     SpO2 96 % 2/19/2020 12:19 PM   Vitals shown include unvalidated device data.        Post Anesthesia Care and Evaluation    Patient location during evaluation: PACU  Patient participation: complete - patient participated  Level of consciousness: awake and alert  Pain score: 0  Pain management: adequate  Airway patency: patent  Anesthetic complications: No anesthetic complications  PONV Status: none  Cardiovascular status: hemodynamically stable and acceptable  Respiratory status: nonlabored ventilation, acceptable and nasal cannula  Hydration status: acceptable

## 2020-02-19 NOTE — ANESTHESIA PREPROCEDURE EVALUATION
Anesthesia Evaluation     Patient summary reviewed and Nursing notes reviewed   NPO Solid Status: > 8 hours  NPO Liquid Status: > 4 hours           Airway   Mallampati: III  TM distance: >3 FB  Neck ROM: full  Possible difficult intubation  Dental      Pulmonary     breath sounds clear to auscultation  Cardiovascular     ECG reviewed  Rhythm: regular  Rate: normal    (+) hypertension, past MI , CAD, cardiac stents more than 12 months ago       Neuro/Psych  GI/Hepatic/Renal/Endo    (+)   diabetes mellitus type 2,     Musculoskeletal     Abdominal    Substance History      OB/GYN          Other   arthritis,      ROS/Med Hx Other: States stress test by cardiologist 1/2020 showed no ischemia and nor recommendations for intervention                  Anesthesia Plan    ASA 3     general     intravenous induction     Anesthetic plan, all risks, benefits, and alternatives have been provided, discussed and informed consent has been obtained with: patient.    Plan discussed with CRNA.

## 2020-03-16 ENCOUNTER — TELEPHONE (OUTPATIENT)
Dept: NEUROSURGERY | Facility: CLINIC | Age: 75
End: 2020-03-16

## 2020-03-16 NOTE — TELEPHONE ENCOUNTER
Patient cancelled appointment. Patient stated he was doing well, but didn't want to get out in the midst of the COVID-19 outbreak. Spoke with Abbey Hogue PA-C. Stated it was ok for patient to cancel.

## 2020-03-16 NOTE — TELEPHONE ENCOUNTER
Patient was returning phone call that he had received but didn't know who it was from.  His  appt for 03/19/20 has been canceled. Please call [phone]

## 2020-03-16 NOTE — TELEPHONE ENCOUNTER
Abbey, he called this morning concerned about coming in this week for his PO. He states he is doing great and needs to know if he can cx this PO?  Concerned about getting out due to coronavirus.

## 2021-09-20 ENCOUNTER — OFFICE VISIT (OUTPATIENT)
Dept: ORTHOPEDIC SURGERY | Facility: CLINIC | Age: 76
End: 2021-09-20

## 2021-09-20 VITALS
DIASTOLIC BLOOD PRESSURE: 80 MMHG | HEART RATE: 76 BPM | BODY MASS INDEX: 32.39 KG/M2 | SYSTOLIC BLOOD PRESSURE: 168 MMHG | HEIGHT: 67 IN | WEIGHT: 206.4 LBS

## 2021-09-20 DIAGNOSIS — M17.12 PRIMARY OSTEOARTHRITIS OF LEFT KNEE: Primary | ICD-10-CM

## 2021-09-20 PROCEDURE — 20610 DRAIN/INJ JOINT/BURSA W/O US: CPT | Performed by: ORTHOPAEDIC SURGERY

## 2021-09-20 PROCEDURE — 99204 OFFICE O/P NEW MOD 45 MIN: CPT | Performed by: ORTHOPAEDIC SURGERY

## 2021-09-20 RX ORDER — GLUCOSAM/CHON-MSM1/C/MANG/BOSW 500-416.6
TABLET ORAL
COMMUNITY
Start: 2021-08-04

## 2021-09-20 RX ORDER — CALCIUM CITRATE/VITAMIN D3 200MG-6.25
TABLET ORAL
COMMUNITY
Start: 2021-08-04

## 2021-09-20 RX ORDER — EVOLOCUMAB 140 MG/ML
INJECTION, SOLUTION SUBCUTANEOUS
COMMUNITY
Start: 2021-09-10

## 2021-09-20 RX ORDER — PEN NEEDLE, DIABETIC 32GX 5/32"
NEEDLE, DISPOSABLE MISCELLANEOUS
COMMUNITY
Start: 2021-08-04

## 2021-09-20 RX ADMIN — ROPIVACAINE HYDROCHLORIDE 4 ML: 5 INJECTION, SOLUTION EPIDURAL; INFILTRATION; PERINEURAL at 13:48

## 2021-09-20 RX ADMIN — TRIAMCINOLONE ACETONIDE 40 MG: 40 INJECTION, SUSPENSION INTRA-ARTICULAR; INTRAMUSCULAR at 13:48

## 2021-09-20 NOTE — PROGRESS NOTES
[facility] Note    Subjective     Chief Complaint   Patient presents with   • Left Knee - Pain        HPI    Joni Hoffman is a 76 y.o. male who presents with new problem of left knee pain.    He has been experiencing left knee pain for approximately 2 to 3 months. The pain is localized to the medial aspect of the left knee with walking. The pain is 3 to 4 out of 10 at its worst. He denies any history of trauma or injury to the left knee. He recalls receiving a left knee injection about 4 years prior.     I have reviewed the following portions of the patient's history and agree with: History of Present Illness and Review of Systems    Patient Active Problem List   Diagnosis   • Herniation of intervertebral disc of lumbar spine   • Recurrent herniation of lumbar disc   • Bulging lumbar disc     Past Medical History:   Diagnosis Date   • Acid indigestion     related to meds   • Arthritis    • Coronary artery disease 2000    s/p 2 stents after MI    • Diabetes mellitus (CMS/[facility]) 2007    po meds; checks blood sugars at home run    • Disease of thyroid gland     hypothyroidism    • Hearing loss     uses hearing aids    • History of kidney stones     passed   • Hypertension    • Lumbar back pain    • MI, old 2000    2 stents inserted   • Wears glasses    • Wears hearing aid       Past Surgical History:   Procedure Laterality Date   • APPENDECTOMY     • CATARACT EXTRACTION WITH INTRAOCULAR LENS IMPLANT Bilateral    • COLONOSCOPY     • CORONARY STENT PLACEMENT  2000    x2   • INTERVENTIONAL RADIOLOGY PROCEDURE N/A 12/31/2019    Procedure: myelogram lumbar spine;  Surgeon: Karan Pennington MD;  Location: [facility];  Service: Interventional Radiology   • LUMBAR DISCECTOMY Left 10/27/2017    Procedure: LUMBAR DISCECTOMY L5-S1 LEFT ;  Surgeon: Elbert De Anda MD;  Location: [facility] OR;  Service:    • LUMBAR DISCECTOMY Left 2/19/2020    Procedure: RE-DO LUMBAR DISCECTOMY L5-S1  LEFT;  Surgeon: Elbert De Anda MD;  Location: [facility];  Service: Neurosurgery;  Laterality: Left;   • NASAL POLYP SURGERY        Family History   Problem Relation Age of Onset   • Heart disease Mother    • Heart disease Father    • Diabetes Sister    • Heart disease Brother      Social History     Socioeconomic History   • Marital status:      Spouse name: Not on file   • Number of children: Not on file   • Years of education: Not on file   • Highest education level: Not on file   Tobacco Use   • Smoking status: Former Smoker     Packs/day: 1.00     Years: 25.00     Pack years: 25.00     Types: Cigarettes     Quit date:      Years since quittin.7   • Smokeless tobacco: Current User     Types: Chew   • Tobacco comment: hx of cigarette use for 25 years and chewed about 25 years (currently chews on occasion)    Substance and Sexual Activity   • Alcohol use: No   • Drug use: No   • Sexual activity: Defer      Current Outpatient Medications on File Prior to Visit   Medication Sig Dispense Refill   • aspirin 81 MG EC tablet Take 81 mg by mouth Daily.     • atenolol (TENORMIN) 25 MG tablet Take 25 mg by mouth Daily With Breakfast.  3   • Droplet Pen Needles 32G X 4 MM misc      • famotidine (PEPCID) 10 MG tablet Take 10 mg by mouth Every Morning.     • glimepiride (AMARYL) 4 MG tablet Take 4 mg by mouth 2 (Two) Times a Day. Takes 4 mg in the am and 2 mg in the PM  1   • levothyroxine (SYNTHROID, LEVOTHROID) 125 MCG tablet Take 125 mcg by mouth Daily.  1   • mometasone (NASONEX) 50 MCG/ACT nasal spray 2 sprays into the nostril(s) as directed by provider Every Morning.     • montelukast (SINGULAIR) 10 MG tablet Take 10 mg by mouth Every Night.     • Nutritional Supplements (JUICE PLUS FIBRE PO) Take 1 dose by mouth Every Morning.     • Repatha SureClick solution auto-injector SureClick injection ADMINISTER 1 INJECTION UNDER THE SKIN EVERY 2 WEEKS     • trandolapril (MAVIK) 4 MG tablet Take 4 mg by mouth  Every Morning.  3   • triamterene-hydrochlorothiazide (MAXZIDE-25) 37.5-25 MG per tablet Take 1 tablet by mouth Every Morning.  1   • True Metrix Blood Glucose Test test strip      • TRUEplus Lancets 30G misc      • [DISCONTINUED] HYDROcodone-acetaminophen (NORCO) 5-325 MG per tablet Take 1 tablet by mouth Every 6 (Six) Hours As Needed for Moderate Pain . 30 tablet 0   • [DISCONTINUED] HYDROcodone-acetaminophen (NORCO) 5-325 MG per tablet Take 1 tablet by mouth Every 6 (Six) Hours As Needed for Moderate Pain . 30 tablet 0   • [DISCONTINUED] rosuvastatin (CRESTOR) 10 MG tablet Take 10 mg by mouth Every Night.       No current facility-administered medications on file prior to visit.      Allergies   Allergen Reactions   • Hydrocodone-Acetaminophen GI Intolerance   • Penicillins Rash        Review of Systems   Constitutional: Negative for activity change, appetite change, chills, diaphoresis, fatigue, fever and unexpected weight change.   HENT: Negative for congestion, dental problem, drooling, ear discharge, ear pain, facial swelling, hearing loss, mouth sores, nosebleeds, postnasal drip, rhinorrhea, sinus pressure, sneezing, sore throat, tinnitus, trouble swallowing and voice change.    Eyes: Negative for photophobia, pain, discharge, redness, itching and visual disturbance.   Respiratory: Negative for apnea, cough, choking, chest tightness, shortness of breath, wheezing and stridor.    Cardiovascular: Negative for chest pain, palpitations and leg swelling.   Gastrointestinal: Negative for abdominal distention, abdominal pain, anal bleeding, blood in stool, constipation, diarrhea, nausea, rectal pain and vomiting.   Endocrine: Negative for cold intolerance, heat intolerance, polydipsia, polyphagia and polyuria.   Genitourinary: Negative for decreased urine volume, difficulty urinating, dysuria, enuresis, flank pain, frequency, genital sores, hematuria and urgency.   Musculoskeletal: Positive for arthralgias.  "Negative for back pain, gait problem, joint swelling, myalgias, neck pain and neck stiffness.   Skin: Negative for color change, pallor, rash and wound.   Allergic/Immunologic: Negative for environmental allergies, food allergies and immunocompromised state.   Neurological: Negative for dizziness, tremors, seizures, syncope, facial asymmetry, speech difficulty, weakness, light-headedness, numbness and headaches.   Hematological: Negative for adenopathy. Does not bruise/bleed easily.   Psychiatric/Behavioral: Negative for agitation, behavioral problems, confusion, decreased concentration, dysphoric mood, hallucinations, self-injury, sleep disturbance and suicidal ideas. The patient is not nervous/anxious and is not hyperactive.         Objective      Physical Exam  /80   Pulse 76   Ht 170.2 cm (67.01\")   Wt 93.6 kg (206 lb 6.4 oz)   BMI 32.32 kg/m²     Body mass index is 32.32 kg/m².    General:   Mental Status:  Alert   Appearance: Cooperative, in no acute distress   Build and Nutrition: Well-nourished well-developed male   Orientation: Alert and oriented to person, place and time   Posture: Normal   Gait: Nonantalgic    Integument  • Left knee: No skin lesions, rash, or ecchymosis.    Neurologic  • Sensation:  • Left foot: Intact to light touch on the dorsal and plantar aspects    Motor  • Left lower extremity: 5/5 quadriceps, hamstrings, ankle dorsiflexors, and ankle plantar flexors    Vascular  • Left lower extremity: 2+ dorsalis pedis pulse. Prompt capillary refill.    Lower Extremities  • Left Knee:  • Tenderness: Medial joint line tenderness.  • Swelling: None  • Effusion: Trace  • Crepitus: Positive  • Atrophy: None  • Range of motion:  • Extension: 0°  • Flexion: 135°  • Instability: No varus or valgus laxity. Negative anterior drawer.  • Deformities: Varus alignment    Imaging/Studies      Imaging Results (Last 24 Hours)     Procedure Component Value Units Date/Time    XR Knee 4+ View Left " [[id number]] Resulted: 09/20/21 1338     Updated: 09/20/21 1338    Narrative:      Left Knee Radiographs  Indication: left knee pain  Views: Standing AP's and skiers of both knees, with lateral and sunrise   views of the left knee    Comparison: no prior studies available    Findings:   Bone contact medial compartment, tricompartmental osteophytes, varus   alignment, no acute bony abnormalities.  No unusual bony features.          Assessment and Plan     Diagnoses and all orders for this visit:    1. Primary osteoarthritis of left knee (Primary)  -     XR Knee 4+ View Left  -     Large Joint Arthrocentesis: L knee        1. Primary osteoarthritis of left knee        Plan  We discussed his left knee x-rays which demonstrated bone-on-bone contact in the medial aspect of the knee. We discussed treatment options, including continued observation or receiving a a cortisone injection. He has elected to proceed with a cortisone injection today.    Procedure Note:  The potential benefits of performing a therapeutic left knee joint injection, as well as potential risks (including, but not limited to infection, swelling, pain, bleeding, bruising, nerve/blood vessel damage, skin color changes, transient elevation in blood glucose levels, and fat atrophy) were discussed with the patient. After informed consent was obtained, a timeout procedure was performed, and the skin on the left knee was prepped with chlorhexidine soap and alcohol, after which ethyl chloride was applied to the skin at the injection site. Via the anterolateral approach, 1 ml of Kenalog 40 mg/ml mixed with 4 ml 0.5% ropivacaine plain was injected into the knee joint. The patient tolerated the procedure well, experiencing 100% improvement a few minutes following the injection. There were no complications. A Band-Aid was applied to the injection site. Post-procedural instructions were given to the patient and/or their caregiver.    Return in about 3 months  (around 12/20/2021).      Transcribed from ambient dictation for Magan Washington MD by Shawn Russell.  09/20/21   14:31 EDT    I have personally performed the services described in this document as transcribed by the above individual, and it is both accurate and complete.  Magan Washington MD  9/21/2021  09:58 EDT

## 2021-09-20 NOTE — PROGRESS NOTES
Procedure   Large Joint Arthrocentesis: L knee  Date/Time: 9/20/2021 1:48 PM  Consent given by: patient  Site marked: site marked  Timeout: Immediately prior to procedure a time out was called to verify the correct patient, procedure, equipment, support staff and site/side marked as required   Supporting Documentation  Indications: pain   Procedure Details  Location: knee - L knee  Preparation: Patient was prepped and draped in the usual sterile fashion  Needle size: 23 G  Approach: anterolateral  Medications administered: 4 mL ropivacaine 0.5 %; 40 mg triamcinolone acetonide 40 MG/ML  Patient tolerance: patient tolerated the procedure well with no immediate complications

## 2021-09-21 RX ORDER — ROPIVACAINE HYDROCHLORIDE 5 MG/ML
4 INJECTION, SOLUTION EPIDURAL; INFILTRATION; PERINEURAL
Status: COMPLETED | OUTPATIENT
Start: 2021-09-20 | End: 2021-09-20

## 2021-09-21 RX ORDER — TRIAMCINOLONE ACETONIDE 40 MG/ML
40 INJECTION, SUSPENSION INTRA-ARTICULAR; INTRAMUSCULAR
Status: COMPLETED | OUTPATIENT
Start: 2021-09-20 | End: 2021-09-20

## 2021-12-20 ENCOUNTER — OFFICE VISIT (OUTPATIENT)
Dept: ORTHOPEDIC SURGERY | Facility: CLINIC | Age: 76
End: 2021-12-20

## 2021-12-20 VITALS
HEART RATE: 74 BPM | BODY MASS INDEX: 32.02 KG/M2 | WEIGHT: 204 LBS | HEIGHT: 67 IN | DIASTOLIC BLOOD PRESSURE: 88 MMHG | SYSTOLIC BLOOD PRESSURE: 150 MMHG

## 2021-12-20 DIAGNOSIS — M17.12 PRIMARY OSTEOARTHRITIS OF LEFT KNEE: Primary | ICD-10-CM

## 2021-12-20 PROCEDURE — 99212 OFFICE O/P EST SF 10 MIN: CPT | Performed by: ORTHOPAEDIC SURGERY

## 2021-12-20 NOTE — PROGRESS NOTES
[facility] [place] [facility] [place]    Subjective     Chief Complaint   Patient presents with   • Follow-up     3 months- Primary osteoarthritis of left knee         HPI    It has been 3  month(s) since Mr. Hoffman's last visit. He returns to clinic today for follow-up of left knee arthritis. The issue has been ongoing for 6 month(s). He rates his pain a 2/10 on the pain scale. Previous/current treatments: NSAIDS. Current symptoms: pain. The pain is worse with walking; sitting improve the pain. Overall, he is doing the same.  The injection helped for about a month and a half, and he is now interested in viscosupplementation injections.  Not yet interested in surgical intervention.    I have reviewed the following portions of the patient's history and agree with: History of Present Illness and Review of Systems    Patient Active Problem List   Diagnosis   • Herniation of intervertebral disc of lumbar spine   • Recurrent herniation of lumbar disc   • Bulging lumbar disc     Past Medical History:   Diagnosis Date   • Acid indigestion     related to meds   • Arthritis    • Coronary artery disease 2000    s/p 2 stents after MI    • Diabetes mellitus (HCC) 2007    po meds; checks blood sugars at home run    • Disease of thyroid gland     hypothyroidism    • Hearing loss     uses hearing aids    • History of kidney stones     passed   • Hypertension    • Lumbar back pain    • MI, old 2000    2 stents inserted   • Wears glasses    • Wears hearing aid       Past Surgical History:   Procedure Laterality Date   • APPENDECTOMY     • CATARACT EXTRACTION WITH INTRAOCULAR LENS IMPLANT Bilateral    • COLONOSCOPY     • CORONARY STENT PLACEMENT  2000    x2   • INTERVENTIONAL RADIOLOGY PROCEDURE N/A 12/31/2019    Procedure: myelogram lumbar spine;  Surgeon: Karan Pennington MD;  Location: [facility] INVASIVE LOCATION;  Service: Interventional Radiology   • LUMBAR DISCECTOMY Left 10/27/2017    Procedure: LUMBAR DISCECTOMY  L5-S1 LEFT ;  Surgeon: Elbert De Anda MD;  Location:  [facility];  Service:    • LUMBAR DISCECTOMY Left 2020    Procedure: RE-DO LUMBAR DISCECTOMY L5-S1 LEFT;  Surgeon: Elbert De Anda MD;  Location:  [facility];  Service: Neurosurgery;  Laterality: Left;   • NASAL POLYP SURGERY        Family History   Problem Relation Age of Onset   • Heart disease Mother    • Heart disease Father    • Diabetes Sister    • Heart disease Brother      Social History     Socioeconomic History   • Marital status:    Tobacco Use   • Smoking status: Former Smoker     Packs/day: 1.00     Years: 25.00     Pack years: 25.00     Types: Cigarettes     Quit date:      Years since quittin.9   • Smokeless tobacco: Current User     Types: Chew   • Tobacco comment: hx of cigarette use for 25 years and chewed about 25 years (currently chews on occasion)    Substance and Sexual Activity   • Alcohol use: No   • Drug use: No   • Sexual activity: Defer      Current Outpatient Medications on File Prior to Visit   Medication Sig Dispense Refill   • aspirin 81 MG EC tablet Take 81 mg by mouth Daily.     • atenolol (TENORMIN) 25 MG tablet Take 25 mg by mouth Daily With Breakfast.  3   • Droplet Pen Needles 32G X 4 MM misc      • famotidine (PEPCID) 10 MG tablet Take 10 mg by mouth Every Morning.     • glimepiride (AMARYL) 4 MG tablet Take 4 mg by mouth 2 (Two) Times a Day. Takes 4 mg in the am and 2 mg in the PM  1   • levothyroxine (SYNTHROID, LEVOTHROID) 125 MCG tablet Take 125 mcg by mouth Daily.  1   • mometasone (NASONEX) 50 MCG/ACT nasal spray 2 sprays into the nostril(s) as directed by provider Every Morning.     • montelukast (SINGULAIR) 10 MG tablet Take 10 mg by mouth Every Night.     • Nutritional Supplements (JUICE PLUS FIBRE PO) Take 1 dose by mouth Every Morning.     • Repatha SureClick solution auto-injector SureClick injection ADMINISTER 1 INJECTION UNDER THE SKIN EVERY 2 WEEKS     • trandolapril (MAVIK) 4 MG tablet Take 4 mg  by mouth Every Morning.  3   • triamterene-hydrochlorothiazide (MAXZIDE-25) 37.5-25 MG per tablet Take 1 tablet by mouth Every Morning.  1   • True Metrix Blood Glucose Test test strip      • TRUEplus Lancets 30G misc        No current facility-administered medications on file prior to visit.      Allergies   Allergen Reactions   • Hydrocodone-Acetaminophen GI Intolerance   • Penicillins Rash        Review of Systems   Constitutional: Negative.  Negative for activity change, appetite change, chills, diaphoresis, fatigue, fever and unexpected weight change.   HENT: Negative.  Negative for congestion, dental problem, drooling, ear discharge, ear pain, facial swelling, hearing loss, mouth sores, nosebleeds, postnasal drip, rhinorrhea, sinus pressure, sneezing, sore throat, tinnitus, trouble swallowing and voice change.    Eyes: Negative.  Negative for photophobia, pain, discharge, redness, itching and visual disturbance.   Respiratory: Negative.  Negative for apnea, cough, choking, chest tightness, shortness of breath, wheezing and stridor.    Cardiovascular: Negative.  Negative for chest pain, palpitations and leg swelling.   Gastrointestinal: Negative.  Negative for abdominal distention, abdominal pain, anal bleeding, blood in stool, constipation, diarrhea, nausea, rectal pain and vomiting.   Endocrine: Negative.  Negative for cold intolerance, heat intolerance, polydipsia, polyphagia and polyuria.   Genitourinary: Negative.  Negative for decreased urine volume, difficulty urinating, dysuria, enuresis, flank pain, frequency, genital sores, hematuria and urgency.   Musculoskeletal: Positive for arthralgias. Negative for back pain, gait problem, joint swelling, myalgias, neck pain and neck stiffness.   Skin: Negative.  Negative for color change, pallor, rash and wound.   Allergic/Immunologic: Negative.  Negative for environmental allergies, food allergies and immunocompromised state.   Neurological: Negative.  Negative  "for dizziness, tremors, seizures, syncope, facial asymmetry, speech difficulty, weakness, light-headedness, numbness and headaches.   Hematological: Negative.  Negative for adenopathy. Does not bruise/bleed easily.   Psychiatric/Behavioral: Negative.  Negative for agitation, behavioral problems, confusion, decreased concentration, dysphoric mood, hallucinations, self-injury, sleep disturbance and suicidal ideas. The patient is not nervous/anxious and is not hyperactive.         Objective      Physical Exam  /88   Pulse 74   Ht 170.2 cm (67.02\")   Wt 92.5 kg (204 lb)   BMI 31.93 kg/m²     Body mass index is 31.93 kg/m².    General:   Mental Status:  Alert   Appearance: Cooperative, in no acute distress   Build and Nutrition: Well-nourished well-developed male   Orientation: Alert and oriented to person, place and time   Posture: Normal   Gait: Nonantalgic    Integument:   Left knee: No skin lesions, no rash, no ecchymosis    Lower Extremities:   Left Knee:    Tenderness:  Medial joint line tenderness    Effusion:  Trace    Swelling:  None    Crepitus: Positive    Range of motion:  Extension: 0°       Flexion: 130°  Instability: No varus laxity, no valgus laxity, negative anterior drawer  Deformities:  Mild varus      Imaging/Studies  Imaging Results (Last 24 Hours)     ** No results found for the last 24 hours. **        No new imaging today    Assessment and Plan     Diagnoses and all orders for this visit:    1. Primary osteoarthritis of left knee (Primary)  -     Large Joint Arthrocentesis        1. Primary osteoarthritis of left knee        I reviewed my findings with patient today.  Left knee continues to bother him.  He is interested in viscosupplementation injections, and we will submit for approval.  I will see him back once they are ready for administration.  Long-term he is a candidate for knee replacement surgery.  He is not yet interested.    Return for After approval of the visco " injection.      Magan Washington MD  12/20/21  08:26 EST

## 2022-01-24 ENCOUNTER — CLINICAL SUPPORT (OUTPATIENT)
Dept: ORTHOPEDIC SURGERY | Facility: CLINIC | Age: 77
End: 2022-01-24

## 2022-01-24 DIAGNOSIS — M17.12 PRIMARY OSTEOARTHRITIS OF LEFT KNEE: Primary | ICD-10-CM

## 2022-01-24 PROCEDURE — 20610 DRAIN/INJ JOINT/BURSA W/O US: CPT | Performed by: ORTHOPAEDIC SURGERY

## 2022-01-24 RX ORDER — LEVOTHYROXINE SODIUM 137 UG/1
TABLET ORAL
COMMUNITY
Start: 2022-01-20

## 2022-01-24 NOTE — PROGRESS NOTES

## 2022-01-24 NOTE — PROGRESS NOTES
[facility]    Subjective     Chief Complaint   Patient presents with   • Injections     Orthovisc # 1 Left Knee         HPI    Joni Hoffman is a 76 y.o. male who presents for the first Orthovisc injection into the left knee.    Patient Active Problem List   Diagnosis   • Herniation of intervertebral disc of lumbar spine   • Recurrent herniation of lumbar disc   • Bulging lumbar disc     Past Medical History:   Diagnosis Date   • Acid indigestion     related to meds   • Arthritis    • Coronary artery disease 2000    s/p 2 stents after MI    • Diabetes mellitus (HCC) 2007    po meds; checks blood sugars at home run    • Disease of thyroid gland     hypothyroidism    • Hearing loss     uses hearing aids    • History of kidney stones     passed   • Hypertension    • Lumbar back pain    • MI, old 2000    2 stents inserted   • Wears glasses    • Wears hearing aid       Past Surgical History:   Procedure Laterality Date   • APPENDECTOMY     • CATARACT EXTRACTION WITH INTRAOCULAR LENS IMPLANT Bilateral    • COLONOSCOPY     • CORONARY STENT PLACEMENT  2000    x2   • INTERVENTIONAL RADIOLOGY PROCEDURE N/A 12/31/2019    Procedure: myelogram lumbar spine;  Surgeon: Karan Pennington MD;  Location:  [facility] IN[facility] LOCATION;  Service: Interventional Radiology   • LUMBAR DISCECTOMY Left 10/27/2017    Procedure: LUMBAR DISCECTOMY L5-S1 LEFT ;  Surgeon: Elbert De Anda MD;  Location:  [facility];  Service:    • LUMBAR DISCECTOMY Left 2/19/2020    Procedure: RE-DO LUMBAR DISCECTOMY L5-S1 LEFT;  Surgeon: Elbert De Anda MD;  Location:  [facility];  Service: Neurosurgery;  Laterality: Left;   • NASAL POLYP SURGERY        Family History   Problem Relation Age of Onset   • Heart disease Mother    • Heart disease Father    • Diabetes Sister    • Heart disease Brother      Social History     Socioeconomic History   • Marital status:    Tobacco Use   • Smoking status: Former Smoker     Packs/day:  1.00     Years: 25.00     Pack years: 25.00     Types: Cigarettes     Quit date:      Years since quittin.0   • Smokeless tobacco: Current User     Types: Chew   • Tobacco comment: hx of cigarette use for 25 years and chewed about 25 years (currently chews on occasion)    Substance and Sexual Activity   • Alcohol use: No   • Drug use: No   • Sexual activity: Defer      Current Outpatient Medications on File Prior to Visit   Medication Sig Dispense Refill   • aspirin 81 MG EC tablet Take 81 mg by mouth Daily.     • atenolol (TENORMIN) 25 MG tablet Take 25 mg by mouth Daily With Breakfast.  3   • Droplet Pen Needles 32G X 4 MM misc      • famotidine (PEPCID) 10 MG tablet Take 10 mg by mouth Every Morning.     • glimepiride (AMARYL) 4 MG tablet Take 4 mg by mouth 2 (Two) Times a Day. Takes 4 mg in the am and 2 mg in the PM  1   • levothyroxine (SYNTHROID, LEVOTHROID) 137 MCG tablet TAKE 1 TABLET BY MOUTH EVERY MORNING ON EMPTY STOMACH     • mometasone (NASONEX) 50 MCG/ACT nasal spray 2 sprays into the nostril(s) as directed by provider Every Morning.     • montelukast (SINGULAIR) 10 MG tablet Take 10 mg by mouth Every Night.     • Nutritional Supplements (JUICE PLUS FIBRE PO) Take 1 dose by mouth Every Morning.     • Repatha SureClick solution auto-injector SureClick injection ADMINISTER 1 INJECTION UNDER THE SKIN EVERY 2 WEEKS     • trandolapril (MAVIK) 4 MG tablet Take 4 mg by mouth Every Morning.  3   • triamterene-hydrochlorothiazide (MAXZIDE-25) 37.5-25 MG per tablet Take 1 tablet by mouth Every Morning.  1   • True Metrix Blood Glucose Test test strip      • TRUEplus Lancets 30G misc      • [DISCONTINUED] levothyroxine (SYNTHROID, LEVOTHROID) 125 MCG tablet Take 125 mcg by mouth Daily.  1     No current facility-administered medications on file prior to visit.      Allergies   Allergen Reactions   • Hydrocodone-Acetaminophen GI Intolerance   • Penicillins Rash        Review of Systems   Constitutional:  Negative.    HENT: Negative.    Eyes: Negative.    Respiratory: Negative.    Cardiovascular: Negative.    Gastrointestinal: Negative.    Endocrine: Negative.    Genitourinary: Negative.    Musculoskeletal: Positive for arthralgias.   Skin: Negative.    Allergic/Immunologic: Negative.    Neurological: Negative.    Hematological: Negative.    Psychiatric/Behavioral: Negative.         Objective      Physical Exam  There were no vitals taken for this visit.    There is no height or weight on file to calculate BMI.    General:   Mental Status:  Alert   Appearance: Cooperative, in no acute distress   Posture: Normal    Assessment and Plan     Diagnoses and all orders for this visit:    1. Primary osteoarthritis of left knee (Primary)  -     Large Joint Arthrocentesis: L knee        1. Primary osteoarthritis of left knee        Procedure Note:  The potential benefits of performing a therapeutic knee joint visco supplementation injection, as well as potential risks (including, but not limited to infection, swelling, pain, bleeding, bruising, nerve/blood vessel damage, and pseudoseptic reaction) have been discussed with the patient.  After informed consent, timeout procedure was performed, and the skin on the left knee was prepped with chlorhexidine soap and alcohol, after which ethyl chloride was applied to the skin at the injection site. Via the anterolateral approach, Orthovisc was injected into the knee joint.  The patient tolerated the procedure well. There were no complications.  Band-Aid was applied to the injection site. Post-procedural instructions discussed with the patient and/or their caregiver.    Return in about 1 week (around 1/31/2022) for Injection.    Magan Washington MD  01/24/22  13:20 EST

## 2022-01-31 ENCOUNTER — CLINICAL SUPPORT (OUTPATIENT)
Dept: ORTHOPEDIC SURGERY | Facility: CLINIC | Age: 77
End: 2022-01-31

## 2022-01-31 DIAGNOSIS — M17.12 PRIMARY OSTEOARTHRITIS OF LEFT KNEE: Primary | ICD-10-CM

## 2022-01-31 PROCEDURE — 20610 DRAIN/INJ JOINT/BURSA W/O US: CPT | Performed by: ORTHOPAEDIC SURGERY

## 2022-01-31 NOTE — PROGRESS NOTES
[facility] [facility] [place]    Subjective     Chief Complaint   Patient presents with   • Injections     Left knee orthovisc injection #2         HPI    Joni Hoffman is a 76 y.o. male who presents for the second Orthovisc injection into the left knee.  Of note, he has seen some improvement so far following the first injection.    Patient Active Problem List   Diagnosis   • Herniation of intervertebral disc of lumbar spine   • Recurrent herniation of lumbar disc   • Bulging lumbar disc     Past Medical History:   Diagnosis Date   • Acid indigestion     related to meds   • Arthritis    • Coronary artery disease 2000    s/p 2 stents after MI    • Diabetes mellitus (HCC) 2007    po meds; checks blood sugars at home run    • Disease of thyroid gland     hypothyroidism    • Hearing loss     uses hearing aids    • History of kidney stones     passed   • Hypertension    • Lumbar back pain    • MI, old 2000    2 stents inserted   • Wears glasses    • Wears hearing aid       Past Surgical History:   Procedure Laterality Date   • APPENDECTOMY     • CATARACT EXTRACTION WITH INTRAOCULAR LENS IMPLANT Bilateral    • COLONOSCOPY     • CORONARY STENT PLACEMENT  2000    x2   • INTERVENTIONAL RADIOLOGY PROCEDURE N/A 12/31/2019    Procedure: myelogram lumbar spine;  Surgeon: Karan Pennington MD;  Location:  [facility] CATH INVASIVE LOCATION;  Service: Interventional Radiology   • LUMBAR DISCECTOMY Left 10/27/2017    Procedure: LUMBAR DISCECTOMY L5-S1 LEFT ;  Surgeon: Elbert De Anda MD;  Location:  [facility];  Service:    • LUMBAR DISCECTOMY Left 2/19/2020    Procedure: RE-DO LUMBAR DISCECTOMY L5-S1 LEFT;  Surgeon: Elbert De Anda MD;  Location:  [facility];  Service: Neurosurgery;  Laterality: Left;   • NASAL POLYP SURGERY        Family History   Problem Relation Age of Onset   • Heart disease Mother    • Heart disease Father    • Diabetes Sister    • Heart disease Brother      Social History     Socioeconomic History   •  Marital status:    Tobacco Use   • Smoking status: Former Smoker     Packs/day: 1.00     Years: 25.00     Pack years: 25.00     Types: Cigarettes     Quit date:      Years since quittin.1   • Smokeless tobacco: Current User     Types: Chew   • Tobacco comment: hx of cigarette use for 25 years and chewed about 25 years (currently chews on occasion)    Substance and Sexual Activity   • Alcohol use: No   • Drug use: No   • Sexual activity: Defer      Current Outpatient Medications on File Prior to Visit   Medication Sig Dispense Refill   • aspirin 81 MG EC tablet Take 81 mg by mouth Daily.     • atenolol (TENORMIN) 25 MG tablet Take 25 mg by mouth Daily With Breakfast.  3   • Droplet Pen Needles 32G X 4 MM misc      • famotidine (PEPCID) 10 MG tablet Take 10 mg by mouth Every Morning.     • glimepiride (AMARYL) 4 MG tablet Take 4 mg by mouth 2 (Two) Times a Day. Takes 4 mg in the am and 2 mg in the PM  1   • levothyroxine (SYNTHROID, LEVOTHROID) 137 MCG tablet TAKE 1 TABLET BY MOUTH EVERY MORNING ON EMPTY STOMACH     • mometasone (NASONEX) 50 MCG/ACT nasal spray 2 sprays into the nostril(s) as directed by provider Every Morning.     • montelukast (SINGULAIR) 10 MG tablet Take 10 mg by mouth Every Night.     • Nutritional Supplements (JUICE PLUS FIBRE PO) Take 1 dose by mouth Every Morning.     • Repatha SureClick solution auto-injector SureClick injection ADMINISTER 1 INJECTION UNDER THE SKIN EVERY 2 WEEKS     • trandolapril (MAVIK) 4 MG tablet Take 4 mg by mouth Every Morning.  3   • triamterene-hydrochlorothiazide (MAXZIDE-25) 37.5-25 MG per tablet Take 1 tablet by mouth Every Morning.  1   • True Metrix Blood Glucose Test test strip      • TRUEplus Lancets 30G misc        No current facility-administered medications on file prior to visit.      Allergies   Allergen Reactions   • Hydrocodone-Acetaminophen GI Intolerance   • Penicillins Rash        Review of Systems     Objective      Physical  Exam  There were no vitals taken for this visit.    There is no height or weight on file to calculate BMI.    General:   Mental Status:  Alert   Appearance: Cooperative, in no acute distress   Posture: Normal    Assessment and Plan     Diagnoses and all orders for this visit:    1. Primary osteoarthritis of left knee (Primary)  -     Large Joint Arthrocentesis: L knee        1. Primary osteoarthritis of left knee        Procedure Note:  The potential benefits of performing a therapeutic knee joint visco supplementation injection, as well as potential risks (including, but not limited to infection, swelling, pain, bleeding, bruising, nerve/blood vessel damage, and pseudoseptic reaction) have been discussed with the patient.  After informed consent, timeout procedure was performed, and the skin on the left knee was prepped with chlorhexidine soap and alcohol, after which ethyl chloride was applied to the skin at the injection site. Via the anterolateral approach, Orthovisc was injected into the knee joint.  The patient tolerated the procedure well. There were no complications.  Band-Aid was applied to the injection site. Post-procedural instructions discussed with the patient and/or their caregiver.    Return in about 1 week (around 2/7/2022) for Injection.    Magan Washington MD  01/31/22  13:18 EST

## 2022-01-31 NOTE — PROGRESS NOTES

## 2022-02-07 ENCOUNTER — CLINICAL SUPPORT (OUTPATIENT)
Dept: ORTHOPEDIC SURGERY | Facility: CLINIC | Age: 77
End: 2022-02-07

## 2022-02-07 DIAGNOSIS — M17.12 PRIMARY OSTEOARTHRITIS OF LEFT KNEE: Primary | ICD-10-CM

## 2022-02-07 PROCEDURE — 20610 DRAIN/INJ JOINT/BURSA W/O US: CPT | Performed by: ORTHOPAEDIC SURGERY

## 2022-02-07 NOTE — PROGRESS NOTES

## 2022-02-07 NOTE — PROGRESS NOTES
[facility] [place] [facility] Note    Subjective     Chief Complaint   Patient presents with   • Injections     Left Knee Orthovisc injection #3        HPI    Joni Hoffman is a 76 y.o. male who presents for the third and final Orthovisc injection into the left knee.  Of note, he has seen improvement so far.    Patient Active Problem List   Diagnosis   • Herniation of intervertebral disc of lumbar spine   • Recurrent herniation of lumbar disc   • Bulging lumbar disc     Past Medical History:   Diagnosis Date   • Acid indigestion     related to meds   • Arthritis    • Coronary artery disease 2000    s/p 2 stents after MI    • Diabetes mellitus (HCC) 2007    po meds; checks blood sugars at home run    • Disease of thyroid gland     hypothyroidism    • Hearing loss     uses hearing aids    • History of kidney stones     passed   • Hypertension    • Lumbar back pain    • MI, old 2000    2 stents inserted   • Wears glasses    • Wears hearing aid       Past Surgical History:   Procedure Laterality Date   • APPENDECTOMY     • CATARACT EXTRACTION WITH INTRAOCULAR LENS IMPLANT Bilateral    • COLONOSCOPY     • CORONARY STENT PLACEMENT  2000    x2   • INTERVENTIONAL RADIOLOGY PROCEDURE N/A 12/31/2019    Procedure: myelogram lumbar spine;  Surgeon: Karan Pennington MD;  Location:  [facility];  Service: Interventional Radiology   • LUMBAR DISCECTOMY Left 10/27/2017    Procedure: LUMBAR DISCECTOMY L5-S1 LEFT ;  Surgeon: Elbert De Anda MD;  Location:  [facility];  Service:    • LUMBAR DISCECTOMY Left 2/19/2020    Procedure: RE-DO LUMBAR DISCECTOMY L5-S1 LEFT;  Surgeon: Elbert De Anda MD;  Location:  [facility];  Service: Neurosurgery;  Laterality: Left;   • NASAL POLYP SURGERY        Family History   Problem Relation Age of Onset   • Heart disease Mother    • Heart disease Father    • Diabetes Sister    • Heart disease Brother      Social History     Socioeconomic History   • Marital status:     Tobacco Use   • Smoking status: Former Smoker     Packs/day: 1.00     Years: 25.00     Pack years: 25.00     Types: Cigarettes     Quit date:      Years since quittin.1   • Smokeless tobacco: Current User     Types: Chew   • Tobacco comment: hx of cigarette use for 25 years and chewed about 25 years (currently chews on occasion)    Substance and Sexual Activity   • Alcohol use: No   • Drug use: No   • Sexual activity: Defer      Current Outpatient Medications on File Prior to Visit   Medication Sig Dispense Refill   • aspirin 81 MG EC tablet Take 81 mg by mouth Daily.     • atenolol (TENORMIN) 25 MG tablet Take 25 mg by mouth Daily With Breakfast.  3   • Droplet Pen Needles 32G X 4 MM misc      • famotidine (PEPCID) 10 MG tablet Take 10 mg by mouth Every Morning.     • glimepiride (AMARYL) 4 MG tablet Take 4 mg by mouth 2 (Two) Times a Day. Takes 4 mg in the am and 2 mg in the PM  1   • levothyroxine (SYNTHROID, LEVOTHROID) 137 MCG tablet TAKE 1 TABLET BY MOUTH EVERY MORNING ON EMPTY STOMACH     • mometasone (NASONEX) 50 MCG/ACT nasal spray 2 sprays into the nostril(s) as directed by provider Every Morning.     • montelukast (SINGULAIR) 10 MG tablet Take 10 mg by mouth Every Night.     • Nutritional Supplements (JUICE PLUS FIBRE PO) Take 1 dose by mouth Every Morning.     • Repatha SureClick solution auto-injector SureClick injection ADMINISTER 1 INJECTION UNDER THE SKIN EVERY 2 WEEKS     • trandolapril (MAVIK) 4 MG tablet Take 4 mg by mouth Every Morning.  3   • triamterene-hydrochlorothiazide (MAXZIDE-25) 37.5-25 MG per tablet Take 1 tablet by mouth Every Morning.  1   • True Metrix Blood Glucose Test test strip      • TRUEplus Lancets 30G misc        No current facility-administered medications on file prior to visit.      Allergies   Allergen Reactions   • Hydrocodone-Acetaminophen GI Intolerance   • Penicillins Rash        Review of Systems   Constitutional: Negative.    HENT: Negative.    Eyes:  Negative.    Respiratory: Negative.    Cardiovascular: Negative.    Gastrointestinal: Negative.    Endocrine: Negative.    Genitourinary: Negative.    Musculoskeletal: Positive for arthralgias.   Skin: Negative.    Allergic/Immunologic: Negative.    Neurological: Negative.    Hematological: Negative.    Psychiatric/Behavioral: Negative.         Objective      Physical Exam  There were no vitals taken for this visit.    There is no height or weight on file to calculate BMI.    General:   Mental Status:  Alert   Appearance: Cooperative, in no acute distress   Posture: Normal    Assessment and Plan     Diagnoses and all orders for this visit:    1. Primary osteoarthritis of left knee (Primary)  -     Large Joint Arthrocentesis: L knee        1. Primary osteoarthritis of left knee        Procedure Note:  The potential benefits of performing a therapeutic knee joint visco supplementation injection, as well as potential risks (including, but not limited to infection, swelling, pain, bleeding, bruising, nerve/blood vessel damage, and pseudoseptic reaction) have been discussed with the patient.  After informed consent, timeout procedure was performed, and the skin on the left knee was prepped with chlorhexidine soap and alcohol, after which ethyl chloride was applied to the skin at the injection site. Via the anterolateral approach, Orthovisc was injected into the knee joint.  The patient tolerated the procedure well. There were no complications.  Band-Aid was applied to the injection site. Post-procedural instructions discussed with the patient and/or their caregiver.    Return in about 6 months (around 8/7/2022).    Magan Washington MD  02/07/22  13:46 EST

## 2022-05-09 ENCOUNTER — TELEPHONE (OUTPATIENT)
Dept: NEUROSURGERY | Facility: CLINIC | Age: 77
End: 2022-05-09

## 2022-05-09 NOTE — TELEPHONE ENCOUNTER
Provider: JERAMIE  Caller: FIORELLA  Relationship to Patient: SELF  Pharmacy: [facility]  Phone Number: [phone]  Reason for Call: APPT  When was the patient last seen: 2.19.20 [facility]  When did it start: 3 WEEKS AGO  Where is it located: HIP/RIGHT LEG ALL THE WAY TO ANKLE  Characteristics of symptom/severity: RIGHT LEG NUMBNESS, NO TROUBLE WALKING, NO LOSS B/B   ANKLE SWELLING   PAIN LEVEL UP TO 10   Timing- Is it constant or intermittent: CONSTANT  What makes it worse: NA  What makes it better:NA  What therapies/medications have you tried: MUSCLE RELAXER AND PAIN PILLS ONLY TAKES THE EDGE OFF    MRI- [facility] @ [facility] IS SENDING REFERRAL/MRI REPORT.      MAY HAVE BEEN SENT TO PRACTICE

## 2022-05-11 NOTE — TELEPHONE ENCOUNTER
Caller: Joni Hoffman    Relationship to patient: Self    Best call back number:[phone]    Chief complaint:BACK PAIN    Type of visit:FOLLOW UP    Requested date:ASAP    If rescheduling, when is the original appointment: NA    Additional notes:PT CALLED BACK TO CHECK ON APPT. PT STATES THAT HE IS IN A LOT OF PAIN-PT STATES THAT HE CAN NOT SLEEP DUE TO THE PAIN-PT HAS REFERRAL IN CHART FROM  WITH IMAGING-PT STATED THAT THIS IS ON HIS RIGHT SIDE-SENDING TO OFFICE TO ADVISE OF NEW IMAGING AND REFERRAL IS NOW IN CHART-THANK YOU

## 2022-06-02 ENCOUNTER — OFFICE VISIT (OUTPATIENT)
Dept: NEUROSURGERY | Facility: CLINIC | Age: 77
End: 2022-06-02

## 2022-06-02 VITALS
WEIGHT: 206.2 LBS | TEMPERATURE: 97 F | BODY MASS INDEX: 32.36 KG/M2 | HEIGHT: 67 IN | SYSTOLIC BLOOD PRESSURE: 140 MMHG | DIASTOLIC BLOOD PRESSURE: 64 MMHG

## 2022-06-02 DIAGNOSIS — M47.819 FACET ARTHROPATHY: ICD-10-CM

## 2022-06-02 DIAGNOSIS — M51.36 DDD (DEGENERATIVE DISC DISEASE), LUMBAR: Primary | ICD-10-CM

## 2022-06-02 PROCEDURE — 99214 OFFICE O/P EST MOD 30 MIN: CPT | Performed by: NEUROLOGICAL SURGERY

## 2022-06-02 RX ORDER — CYCLOBENZAPRINE HCL 5 MG
TABLET ORAL
COMMUNITY
Start: 2022-06-01

## 2022-06-02 RX ORDER — ROSUVASTATIN CALCIUM 20 MG/1
TABLET, COATED ORAL
COMMUNITY
Start: 2022-04-20

## 2022-06-02 RX ORDER — DOXYCYCLINE 100 MG/1
100 CAPSULE ORAL 2 TIMES DAILY
COMMUNITY
Start: 2022-04-28

## 2022-06-02 RX ORDER — TAMSULOSIN HYDROCHLORIDE 0.4 MG/1
CAPSULE ORAL
COMMUNITY
Start: 2022-06-01

## 2022-06-02 RX ORDER — CHLORHEXIDINE GLUCONATE 0.12 MG/ML
RINSE ORAL
COMMUNITY
Start: 2022-05-19

## 2022-06-02 RX ORDER — TRAMADOL HYDROCHLORIDE 50 MG/1
TABLET ORAL
COMMUNITY
Start: 2022-05-02

## 2022-06-02 RX ORDER — IBUPROFEN 600 MG/1
TABLET ORAL
COMMUNITY
Start: 2022-05-12

## 2022-06-02 RX ORDER — HYDROCODONE BITARTRATE AND ACETAMINOPHEN 5; 325 MG/1; MG/1
1 TABLET ORAL 2 TIMES DAILY
COMMUNITY
Start: 2022-05-17

## 2022-06-02 NOTE — PROGRESS NOTES
NAME: FIORELLA BAEZ   DOS: 2022  : 1945  PCP: Little Barker MD    Chief Complaint:    Chief Complaint   Patient presents with   • Back Pain     Lower Back    • Leg Pain     RLE        History of Present Illness:  77 y.o. male     I saw this 77-year-old male in neurosurgical consultation he is a patient of Dr. De Anda's he has had prior to surgery as it is lumbar disc L5-S1.  His chronic axial back pain that subtle about the last month he has had increasing right-sided buttock hip and leg pain it radiates in an L4 perhaps L5 distribution is worse in the morning worse with positional changes he has not been through physical therapy he is here for evaluation he is a diabetic has a significant coronary disease and is here for evaluation he currently takes aspirin  PMHX  Allergies:  Allergies   Allergen Reactions   • Hydrocodone-Acetaminophen GI Intolerance   • Penicillins Rash     Medications    Current Outpatient Medications:   •  aspirin 81 MG EC tablet, Take 81 mg by mouth Daily., Disp: , Rfl:   •  atenolol (TENORMIN) 25 MG tablet, Take 25 mg by mouth Daily With Breakfast., Disp: , Rfl: 3  •  chlorhexidine (PERIDEX) 0.12 % solution, RINSE 1/2 OUNCE (15 ML) IN MOUTH FOR 30 SECONDS THEN SPIT TWICE DAILY, Disp: , Rfl:   •  cyclobenzaprine (FLEXERIL) 5 MG tablet, , Disp: , Rfl:   •  doxycycline (MONODOX) 100 MG capsule, Take 100 mg by mouth 2 (Two) Times a Day., Disp: , Rfl:   •  Droplet Pen Needles 32G X 4 MM misc, , Disp: , Rfl:   •  famotidine (PEPCID) 10 MG tablet, Take 10 mg by mouth Every Morning., Disp: , Rfl:   •  glimepiride (AMARYL) 4 MG tablet, Take 4 mg by mouth 2 (Two) Times a Day. Takes 4 mg in the am and 2 mg in the PM, Disp: , Rfl: 1  •  HYDROcodone-acetaminophen (NORCO) 5-325 MG per tablet, Take 1 tablet by mouth 2 (Two) Times a Day., Disp: , Rfl:   •  ibuprofen (ADVIL,MOTRIN) 600 MG tablet, TAKE 1 TABLET BY MOUTH THREE TIMES DAILY AS NEEDED FOR BACK PAIN *TAKE WITH ACETAMINOPHEN,  Disp: , Rfl:   •  levothyroxine (SYNTHROID, LEVOTHROID) 137 MCG tablet, TAKE 1 TABLET BY MOUTH EVERY MORNING ON EMPTY STOMACH, Disp: , Rfl:   •  mometasone (NASONEX) 50 MCG/ACT nasal spray, 2 sprays into the nostril(s) as directed by provider Every Morning., Disp: , Rfl:   •  montelukast (SINGULAIR) 10 MG tablet, Take 10 mg by mouth Every Night., Disp: , Rfl:   •  Nutritional Supplements (JUICE PLUS FIBRE PO), Take 1 dose by mouth Every Morning., Disp: , Rfl:   •  Repatha SureClick solution auto-injector SureClick injection, ADMINISTER 1 INJECTION UNDER THE SKIN EVERY 2 WEEKS, Disp: , Rfl:   •  rosuvastatin (CRESTOR) 20 MG tablet, , Disp: , Rfl:   •  tamsulosin (FLOMAX) 0.4 MG capsule 24 hr capsule, , Disp: , Rfl:   •  traMADol (ULTRAM) 50 MG tablet, TAKE 1 TABLET BY MOUTH 4 TIMES DAILY AS NEEDED FOR PAIN, Disp: , Rfl:   •  trandolapril (MAVIK) 4 MG tablet, Take 4 mg by mouth Every Morning., Disp: , Rfl: 3  •  triamterene-hydrochlorothiazide (MAXZIDE-25) 37.5-25 MG per tablet, Take 1 tablet by mouth Every Morning., Disp: , Rfl: 1  •  True Metrix Blood Glucose Test test strip, , Disp: , Rfl:   •  TRUEplus Lancets 30G misc, , Disp: , Rfl:   Past Medical History:  Past Medical History:   Diagnosis Date   • Acid indigestion     related to meds   • Arthritis    • Coronary artery disease 2000    s/p 2 stents after MI    • Diabetes mellitus (HCC) 2007    po meds; checks blood sugars at home run    • Disease of thyroid gland     hypothyroidism    • Hearing loss     uses hearing aids    • History of kidney stones     passed   • Hypertension    • Lumbar back pain    • MI, old 2000    2 stents inserted   • Wears glasses    • Wears hearing aid      Past Surgical History:  Past Surgical History:   Procedure Laterality Date   • APPENDECTOMY     • CATARACT EXTRACTION WITH INTRAOCULAR LENS IMPLANT Bilateral    • COLONOSCOPY     • CORONARY STENT PLACEMENT  2000    x2   • INTERVENTIONAL RADIOLOGY PROCEDURE N/A 12/31/2019     Procedure: myelogram lumbar spine;  Surgeon: Karan Pennington MD;  Location:  [facility];  Service: Interventional Radiology   • LUMBAR DISCECTOMY Left 10/27/2017    Procedure: LUMBAR DISCECTOMY L5-S1 LEFT ;  Surgeon: Elbert De Anda MD;  Location:  [facility];  Service:    • LUMBAR DISCECTOMY Left 2020    Procedure: RE-DO LUMBAR DISCECTOMY L5-S1 LEFT;  Surgeon: Elbert De Anda MD;  Location:  [facility];  Service: Neurosurgery;  Laterality: Left;   • NASAL POLYP SURGERY       Social Hx:  Social History     Tobacco Use   • Smoking status: Former Smoker     Packs/day: 1.00     Years: 25.00     Pack years: 25.00     Types: Cigarettes     Quit date:      Years since quittin.4   • Smokeless tobacco: Current User     Types: Chew   • Tobacco comment: hx of cigarette use for 25 years and chewed about 25 years (currently chews on occasion)    Substance Use Topics   • Alcohol use: No   • Drug use: No     Family Hx:  Family History   Problem Relation Age of Onset   • Heart disease Mother    • Heart disease Father    • Diabetes Sister    • Heart disease Brother      Review of Systems:        Review of Systems   Constitutional: Positive for chills and fatigue. Negative for activity change, appetite change, diaphoresis, fever and unexpected weight change.   HENT: Positive for hearing loss. Negative for congestion, dental problem, drooling, ear discharge, ear pain, facial swelling, mouth sores, nosebleeds, postnasal drip, rhinorrhea, sinus pressure, sinus pain, sneezing, sore throat, tinnitus, trouble swallowing and voice change.    Eyes: Negative for photophobia, pain, discharge, redness, itching and visual disturbance.   Respiratory: Negative for apnea, cough, choking, chest tightness, shortness of breath, wheezing and stridor.    Cardiovascular: Negative for chest pain, palpitations and leg swelling.   Gastrointestinal: Negative for abdominal distention, abdominal pain, anal bleeding, blood in stool,  constipation, diarrhea, nausea, rectal pain and vomiting.   Endocrine: Negative for cold intolerance, heat intolerance, polydipsia, polyphagia and polyuria.   Genitourinary: Negative for decreased urine volume, difficulty urinating, dysuria, enuresis, flank pain, frequency, genital sores, hematuria, penile discharge, penile pain, penile swelling, scrotal swelling, testicular pain and urgency.   Musculoskeletal: Negative for arthralgias, back pain, gait problem, joint swelling, myalgias, neck pain and neck stiffness.   Skin: Negative for color change, pallor, rash and wound.   Allergic/Immunologic: Positive for food allergies. Negative for environmental allergies and immunocompromised state.   Neurological: Negative for dizziness, tremors, seizures, syncope, facial asymmetry, speech difficulty, weakness, light-headedness, numbness and headaches.   Hematological: Negative for adenopathy. Does not bruise/bleed easily.   Psychiatric/Behavioral: Negative for agitation, behavioral problems, confusion, decreased concentration, dysphoric mood, hallucinations, self-injury, sleep disturbance and suicidal ideas. The patient is not nervous/anxious and is not hyperactive.       I have reviewed this note template and all pertinent parts of the review of systems social, family history, surgical history and medication list      Physical Examination:  Vitals:    06/02/22 1512   BP: 140/64   Temp: 97 °F (36.1 °C)      General Appearance:   Well developed, well nourished, well groomed, alert, and cooperative.  Neurological examination:  Neurologic Exam  Vital signs were reviewed and documented in the chart  Patient appeared in good neurologic function with normal comprehension fluent speech  Mood and affect are normal  Sense of smell deferred    COVID mass left in place    Muscle bulk and tone normal  5 out of 5 strength no motor drift  Gait normal intact  Negative Romberg  No clonus long tract signs or myelopathy  Well-healed left  paraspinal incision with scarring  Reflexes symmetric trace to absent bilaterally  2+ edema noted and extremities skin appears normal    Straight leg raise sign absent  No signs of intrinsic hip dysfunction  Back is without any lesions or abnormality  Feet are warm and well perfused        Review of Imaging/DATA:  I reviewed an MRI of his lumbar spine as an open MRI is of count of a poor quality he may have a right-sided synovial cyst L4-5 he is got some intraforaminal disc disease at L3-4 L4-5 and L5-S1 given the quality of the study it is difficult to ascertain the intraforaminal components of disease  Diagnoses/Plan:    Mr. Hoffman is a 77 y.o. male   1.  Degenerative disc L3-4 L4-5 L5-S1  2.  Suspected right-sided synovial cyst 4 5  3.  History of prior back surgery left L5-S1    Talked him about the treatment options given his advanced age cardiovascular risk factors prior stenting and complexity of his spine in the absence of high-grade central spinal stenosis I recommended a more conservative course of action he is only a month into this.  I recommended aggressive physical therapy 6 to 8 weeks lumbar epidural steroid blocks.  We can contemplate a lumbar myelogram EMG nerve conduction study if he is no better in 8 weeks.  I explained to concept of surgeries for lumbar disc disease and how revision surgeries and instrumentation are quite complicated in this age population.

## 2022-08-08 ENCOUNTER — OFFICE VISIT (OUTPATIENT)
Dept: ORTHOPEDIC SURGERY | Facility: CLINIC | Age: 77
End: 2022-08-08

## 2022-08-08 ENCOUNTER — OFFICE VISIT (OUTPATIENT)
Dept: NEUROSURGERY | Facility: CLINIC | Age: 77
End: 2022-08-08

## 2022-08-08 ENCOUNTER — HOSPITAL ENCOUNTER (OUTPATIENT)
Dept: GENERAL RADIOLOGY | Facility: HOSPITAL | Age: 77
Discharge: HOME | End: 2022-08-08

## 2022-08-08 ENCOUNTER — HOSPITAL ENCOUNTER (OUTPATIENT)
Dept: NEUROLOGY | Facility: HOSPITAL | Age: 77
Discharge: HOME | End: 2022-08-08

## 2022-08-08 VITALS
HEIGHT: 67 IN | WEIGHT: 204.59 LBS | BODY MASS INDEX: 32.11 KG/M2 | DIASTOLIC BLOOD PRESSURE: 86 MMHG | SYSTOLIC BLOOD PRESSURE: 122 MMHG

## 2022-08-08 VITALS
SYSTOLIC BLOOD PRESSURE: 120 MMHG | TEMPERATURE: 97 F | BODY MASS INDEX: 32.11 KG/M2 | HEIGHT: 67 IN | DIASTOLIC BLOOD PRESSURE: 70 MMHG | WEIGHT: 204.6 LBS

## 2022-08-08 DIAGNOSIS — G89.29 CHRONIC PAIN OF RIGHT KNEE: Primary | ICD-10-CM

## 2022-08-08 DIAGNOSIS — M51.36 DDD (DEGENERATIVE DISC DISEASE), LUMBAR: ICD-10-CM

## 2022-08-08 DIAGNOSIS — M47.819 FACET ARTHROPATHY: ICD-10-CM

## 2022-08-08 DIAGNOSIS — M17.12 PRIMARY OSTEOARTHRITIS OF LEFT KNEE: ICD-10-CM

## 2022-08-08 DIAGNOSIS — M54.16 LUMBAR RADICULOPATHY: Primary | ICD-10-CM

## 2022-08-08 DIAGNOSIS — M51.36 DDD (DEGENERATIVE DISC DISEASE), LUMBAR: Primary | ICD-10-CM

## 2022-08-08 DIAGNOSIS — M25.561 CHRONIC PAIN OF RIGHT KNEE: Primary | ICD-10-CM

## 2022-08-08 PROBLEM — I51.9 CHRONIC HEART DISEASE: Status: ACTIVE | Noted: 2022-06-30

## 2022-08-08 PROBLEM — I10 HYPERTENSIVE DISORDER: Status: ACTIVE | Noted: 2022-06-30

## 2022-08-08 PROBLEM — Z79.4 ENCOUNTER FOR LONG-TERM (CURRENT) USE OF INSULIN: Status: ACTIVE | Noted: 2022-06-30

## 2022-08-08 PROCEDURE — 73564 X-RAY EXAM KNEE 4 OR MORE: CPT | Performed by: ORTHOPAEDIC SURGERY

## 2022-08-08 PROCEDURE — 99213 OFFICE O/P EST LOW 20 MIN: CPT | Performed by: ORTHOPAEDIC SURGERY

## 2022-08-08 PROCEDURE — 95910 NRV CNDJ TEST 7-8 STUDIES: CPT

## 2022-08-08 PROCEDURE — 95886 MUSC TEST DONE W/N TEST COMP: CPT

## 2022-08-08 PROCEDURE — 99214 OFFICE O/P EST MOD 30 MIN: CPT | Performed by: NEUROLOGICAL SURGERY

## 2022-08-08 PROCEDURE — 72120 X-RAY BEND ONLY L-S SPINE: CPT

## 2022-08-08 RX ORDER — TIZANIDINE 4 MG/1
TABLET ORAL
COMMUNITY

## 2022-08-08 RX ORDER — TRIAMTERENE AND HYDROCHLOROTHIAZIDE 37.5; 25 MG/1; MG/1
CAPSULE ORAL
COMMUNITY
Start: 2022-06-28

## 2022-08-08 NOTE — PROGRESS NOTES
"  NAME: FIORELLA BAEZ   DOS: 2022  : 1945  PCP: Little Barker MD    Chief Complaint:    Chief Complaint   Patient presents with   • Back Pain       History of Present Illness:  77 y.o. male     This is a gentleman with a prior history of left-sided L5-S1 discectomy who presented with a suspected right-sided L4-5 radicular issue.  He is here for evaluation he is doing better he had some intermittent pain that lancinating down the leg he had an epidural that helped where they \"drained a cyst \"he is here for evaluation he has a history of coronary disease is on Repatha  PMHX  Allergies:  Allergies   Allergen Reactions   • Hydrocodone-Acetaminophen GI Intolerance   • Penicillins Rash     Medications    Current Outpatient Medications:   •  aspirin 81 MG EC tablet, Take 81 mg by mouth Daily., Disp: , Rfl:   •  atenolol (TENORMIN) 25 MG tablet, Take 25 mg by mouth Daily With Breakfast., Disp: , Rfl: 3  •  chlorhexidine (PERIDEX) 0.12 % solution, RINSE 1/2 OUNCE (15 ML) IN MOUTH FOR 30 SECONDS THEN SPIT TWICE DAILY, Disp: , Rfl:   •  cyclobenzaprine (FLEXERIL) 5 MG tablet, , Disp: , Rfl:   •  doxycycline (MONODOX) 100 MG capsule, Take 100 mg by mouth 2 (Two) Times a Day., Disp: , Rfl:   •  Droplet Pen Needles 32G X 4 MM misc, , Disp: , Rfl:   •  famotidine (PEPCID) 10 MG tablet, Take 10 mg by mouth Every Morning., Disp: , Rfl:   •  glimepiride (AMARYL) 4 MG tablet, Take 4 mg by mouth 2 (Two) Times a Day. Takes 4 mg in the am and 2 mg in the PM, Disp: , Rfl: 1  •  HYDROcodone-acetaminophen (NORCO) 5-325 MG per tablet, Take 1 tablet by mouth 2 (Two) Times a Day., Disp: , Rfl:   •  ibuprofen (ADVIL,MOTRIN) 600 MG tablet, TAKE 1 TABLET BY MOUTH THREE TIMES DAILY AS NEEDED FOR BACK PAIN *TAKE WITH ACETAMINOPHEN, Disp: , Rfl:   •  levothyroxine (SYNTHROID, LEVOTHROID) 137 MCG tablet, TAKE 1 TABLET BY MOUTH EVERY MORNING ON EMPTY STOMACH, Disp: , Rfl:   •  mometasone (NASONEX) 50 MCG/ACT nasal spray, 2 sprays " into the nostril(s) as directed by provider Every Morning., Disp: , Rfl:   •  montelukast (SINGULAIR) 10 MG tablet, Take 10 mg by mouth Every Night., Disp: , Rfl:   •  Nutritional Supplements (JUICE PLUS FIBRE PO), Take 1 dose by mouth Every Morning., Disp: , Rfl:   •  Repatha SureClick solution auto-injector SureClick injection, ADMINISTER 1 INJECTION UNDER THE SKIN EVERY 2 WEEKS, Disp: , Rfl:   •  rosuvastatin (CRESTOR) 20 MG tablet, , Disp: , Rfl:   •  tamsulosin (FLOMAX) 0.4 MG capsule 24 hr capsule, , Disp: , Rfl:   •  tiZANidine (ZANAFLEX) 4 MG tablet, tizanidine 4 mg tablet  TAKE 1 TABLET BY MOUTH THREE TIMES DAILY AS NEEDED FOR BACK, Disp: , Rfl:   •  traMADol (ULTRAM) 50 MG tablet, TAKE 1 TABLET BY MOUTH 4 TIMES DAILY AS NEEDED FOR PAIN, Disp: , Rfl:   •  trandolapril (MAVIK) 4 MG tablet, Take 4 mg by mouth Every Morning., Disp: , Rfl: 3  •  triamterene-hydrochlorothiazide (DYAZIDE) 37.5-25 MG per capsule, , Disp: , Rfl:   •  True Metrix Blood Glucose Test test strip, , Disp: , Rfl:   •  TRUEplus Lancets 30G misc, , Disp: , Rfl:   Past Medical History:  Past Medical History:   Diagnosis Date   • Acid indigestion     related to meds   • Arthritis    • Coronary artery disease 2000    s/p 2 stents after MI    • Diabetes mellitus (HCC) 2007    po meds; checks blood sugars at home run    • Disease of thyroid gland     hypothyroidism    • Hearing loss     uses hearing aids    • History of kidney stones     passed   • Hypertension    • Lumbar back pain    • MI, old 2000    2 stents inserted   • Wears glasses    • Wears hearing aid      Past Surgical History:  Past Surgical History:   Procedure Laterality Date   • APPENDECTOMY     • CATARACT EXTRACTION WITH INTRAOCULAR LENS IMPLANT Bilateral    • COLONOSCOPY     • CORONARY STENT PLACEMENT  2000    x2   • INTERVENTIONAL RADIOLOGY PROCEDURE N/A 12/31/2019    Procedure: myelogram lumbar spine;  Surgeon: Karan Pennington MD;  Location: [facility] CATH INVASIVE  LOCATION;  Service: Interventional Radiology   • LUMBAR DISCECTOMY Left 10/27/2017    Procedure: LUMBAR DISCECTOMY L5-S1 LEFT ;  Surgeon: Elbert De Anda MD;  Location:  [facility];  Service:    • LUMBAR DISCECTOMY Left 2020    Procedure: RE-DO LUMBAR DISCECTOMY L5-S1 LEFT;  Surgeon: Elbert De Anda MD;  Location:  [facility];  Service: Neurosurgery;  Laterality: Left;   • NASAL POLYP SURGERY       Social Hx:  Social History     Tobacco Use   • Smoking status: Former Smoker     Packs/day: 1.00     Years: 25.00     Pack years: 25.00     Types: Cigarettes     Quit date:      Years since quittin.6   • Smokeless tobacco: Current User     Types: Chew   • Tobacco comment: hx of cigarette use for 25 years and chewed about 25 years (currently chews on occasion)    Substance Use Topics   • Alcohol use: No   • Drug use: No     Family Hx:  Family History   Problem Relation Age of Onset   • Heart disease Mother    • Heart disease Father    • Diabetes Sister    • Heart disease Brother      Review of Systems:        Review of Systems   Constitutional: Positive for chills and fatigue. Negative for activity change, appetite change, diaphoresis, fever and unexpected weight change.   HENT: Positive for hearing loss. Negative for congestion, dental problem, drooling, ear discharge, ear pain, facial swelling, mouth sores, nosebleeds, postnasal drip, rhinorrhea, sinus pressure, sinus pain, sneezing, sore throat, tinnitus, trouble swallowing and voice change.    Eyes: Negative for photophobia, pain, discharge, redness, itching and visual disturbance.   Respiratory: Negative for apnea, cough, choking, chest tightness, shortness of breath, wheezing and stridor.    Cardiovascular: Negative for chest pain, palpitations and leg swelling.   Gastrointestinal: Negative for abdominal distention, abdominal pain, anal bleeding, blood in stool, constipation, diarrhea, nausea, rectal pain and vomiting.   Endocrine: Negative for cold  intolerance, heat intolerance, polydipsia, polyphagia and polyuria.   Genitourinary: Negative for decreased urine volume, difficulty urinating, dysuria, enuresis, flank pain, frequency, genital sores, hematuria, penile discharge, penile pain, penile swelling, scrotal swelling, testicular pain and urgency.   Musculoskeletal: Negative for arthralgias, back pain, gait problem, joint swelling, myalgias, neck pain and neck stiffness.   Skin: Negative for color change, pallor, rash and wound.   Allergic/Immunologic: Positive for food allergies. Negative for environmental allergies and immunocompromised state.   Neurological: Negative for dizziness, tremors, seizures, syncope, facial asymmetry, speech difficulty, weakness, light-headedness, numbness and headaches.   Hematological: Negative for adenopathy. Does not bruise/bleed easily.   Psychiatric/Behavioral: Negative for agitation, behavioral problems, confusion, decreased concentration, dysphoric mood, hallucinations, self-injury, sleep disturbance and suicidal ideas. The patient is not nervous/anxious and is not hyperactive.             Physical Examination:  Vitals:    08/08/22 1322   BP: 120/70   Temp: 97 °F (36.1 °C)      General Appearance:   Well developed, well nourished, well groomed, alert, and cooperative.  Neurological examination:  Neurologic Exam  Wide awake alert following commands    Good strength lower extremities    No foot drop    Bilateral hip arthrosis noted gait is normal    Review of Imaging/DATA:  I reviewed an MRI that shows right-sided L4-5 lateral recess syndrome as an open MRI he has what I would classify as moderate central canal stenosis with moderate to severe lateral recess syndrome EMG was personally reviewed shows chronic/subacute L5 radiculopathy on the right  Diagnoses/Plan:    Mr. Hoffman is a 77 y.o. male   1.  Right-sided chronic L4-5 degenerative changes intraforaminal disease as well at 5 1 history of prior left-sided disc  surgery  2.  No evidence of neurogenic claudication today intermittent right-sided lancinating pain to the right knee  With success of recent treatment with epidural steroid block    I recommend full conservative management right now is not interested in surgery.  He also reported that he did not really do that well with his last surgery from Dr. De Anda.  I think it is reasonable to continue interventional pain management spinal cord stimulator if done percutaneously would also be a reasonable option    If he wishes to be seen to evaluate him for persistent symptomatology I would recommend a myelogram.

## 2022-08-08 NOTE — PROGRESS NOTES
[facility] Note    Subjective     Chief Complaint   Patient presents with   • Right Knee - Pain   • Follow-up     6 months- Primary osteoarthritis of left knee        HPI  Joni Hoffman is a 77 y.o. male who presents with new problem of: right knee pain.  Onset: direct blow. The issue has been ongoing for 2 month(s). Pain is a 1/10 on the pain scale. Pain is described as dull. Associated symptoms include stiffness. The pain is worse with walking; resting improve the pain. Previous treatments have included: nothing.  He has sciatica in the right lower extremity, and most of his pain sounds like it is coming from sciatica, not necessarily from the knee.    It has been 6  month(s) since Mr. Hoffman's last visit. He returns to clinic today for follow-up of left knee arthritis. The issue has been ongoing for 14 month(s). He rates his pain a 0/10 on the pain scale. Previous/current treatments: viscosupplementation (last injection 02/07/2022). Current symptoms: stiffness. The pain is worse with walking; resting improve the pain. Overall, he is doing better.  The viscosupplementation injections provided relief.    I have reviewed the following portions of the patient's history and agree with: History of Present Illness and Review of Systems    Patient Active Problem List   Diagnosis   • Herniation of intervertebral disc of lumbar spine   • Recurrent herniation of lumbar disc   • Bulging lumbar disc   • Actinic keratosis   • Chronic heart disease   • Encounter for long-term (current) use of insulin (HCC)   • Hypertensive disorder   • Senile hyperkeratosis     Past Medical History:   Diagnosis Date   • Acid indigestion     related to meds   • Arthritis    • Coronary artery disease 2000    s/p 2 stents after MI    • Diabetes mellitus (HCC) 2007    po meds; checks blood sugars at home run    • Disease of thyroid gland     hypothyroidism    • Hearing loss     uses hearing aids    • History of  kidney stones     passed   • Hypertension    • Lumbar back pain    • MI, old     2 stents inserted   • Wears glasses    • Wears hearing aid       Past Surgical History:   Procedure Laterality Date   • APPENDECTOMY     • CATARACT EXTRACTION WITH INTRAOCULAR LENS IMPLANT Bilateral    • COLONOSCOPY     • CORONARY STENT PLACEMENT  2000    x2   • INTERVENTIONAL RADIOLOGY PROCEDURE N/A 2019    Procedure: myelogram lumbar spine;  Surgeon: Karan Pennington MD;  Location:  [facility]ATH INVASIVE [facility];  Service: Interventional Radiology   • LUMBAR DISCECTOMY Left 10/27/2017    Procedure: LUMBAR DISCECTOMY L5-S1 LEFT ;  Surgeon: Elbert De Anda MD;  Location:  [facility];  Service:    • LUMBAR DISCECTOMY Left 2020    Procedure: RE-DO LUMBAR DISCECTOMY L5-S1 LEFT;  Surgeon: Elbert De Anda MD;  Location:  [facility];  Service: Neurosurgery;  Laterality: Left;   • NASAL POLYP SURGERY        Family History   Problem Relation Age of Onset   • Heart disease Mother    • Heart disease Father    • Diabetes Sister    • Heart disease Brother      Social History     Socioeconomic History   • Marital status:    Tobacco Use   • Smoking status: Former Smoker     Packs/day: 1.00     Years: 25.00     Pack years: 25.00     Types: Cigarettes     Quit date:      Years since quittin.6   • Smokeless tobacco: Current User     Types: Chew   • Tobacco comment: hx of cigarette use for 25 years and chewed about 25 years (currently chews on occasion)    Substance and Sexual Activity   • Alcohol use: No   • Drug use: No   • Sexual activity: Defer      Current Outpatient Medications on File Prior to Visit   Medication Sig Dispense Refill   • aspirin 81 MG EC tablet Take 81 mg by mouth Daily.     • atenolol (TENORMIN) 25 MG tablet Take 25 mg by mouth Daily With Breakfast.  3   • chlorhexidine (PERIDEX) 0.12 % solution RINSE 1/2 OUNCE (15 ML) IN MOUTH FOR 30 SECONDS THEN SPIT TWICE DAILY     • cyclobenzaprine (FLEXERIL) 5 MG tablet       • doxycycline (MONODOX) 100 MG capsule Take 100 mg by mouth 2 (Two) Times a Day.     • Droplet Pen Needles 32G X 4 MM misc      • famotidine (PEPCID) 10 MG tablet Take 10 mg by mouth Every Morning.     • glimepiride (AMARYL) 4 MG tablet Take 4 mg by mouth 2 (Two) Times a Day. Takes 4 mg in the am and 2 mg in the PM  1   • HYDROcodone-acetaminophen (NORCO) 5-325 MG per tablet Take 1 tablet by mouth 2 (Two) Times a Day.     • ibuprofen (ADVIL,MOTRIN) 600 MG tablet TAKE 1 TABLET BY MOUTH THREE TIMES DAILY AS NEEDED FOR BACK PAIN *TAKE WITH ACETAMINOPHEN     • levothyroxine (SYNTHROID, LEVOTHROID) 137 MCG tablet TAKE 1 TABLET BY MOUTH EVERY MORNING ON EMPTY STOMACH     • mometasone (NASONEX) 50 MCG/ACT nasal spray 2 sprays into the nostril(s) as directed by provider Every Morning.     • montelukast (SINGULAIR) 10 MG tablet Take 10 mg by mouth Every Night.     • Nutritional Supplements (JUICE PLUS FIBRE PO) Take 1 dose by mouth Every Morning.     • Repatha SureClick solution auto-injector SureClick injection ADMINISTER 1 INJECTION UNDER THE SKIN EVERY 2 WEEKS     • rosuvastatin (CRESTOR) 20 MG tablet      • tamsulosin (FLOMAX) 0.4 MG capsule 24 hr capsule      • traMADol (ULTRAM) 50 MG tablet TAKE 1 TABLET BY MOUTH 4 TIMES DAILY AS NEEDED FOR PAIN     • trandolapril (MAVIK) 4 MG tablet Take 4 mg by mouth Every Morning.  3   • True Metrix Blood Glucose Test test strip      • TRUEplus Lancets 30G misc      • [DISCONTINUED] triamterene-hydrochlorothiazide (MAXZIDE-25) 37.5-25 MG per tablet Take 1 tablet by mouth Every Morning.  1     No current facility-administered medications on file prior to visit.      Allergies   Allergen Reactions   • Hydrocodone-Acetaminophen GI Intolerance   • Penicillins Rash        Review of Systems   Constitutional: Negative for activity change, appetite change, chills, diaphoresis, fatigue, fever and unexpected weight change.   HENT: Negative for congestion, dental problem, drooling, ear  "discharge, ear pain, facial swelling, hearing loss, mouth sores, nosebleeds, postnasal drip, rhinorrhea, sinus pressure, sneezing, sore throat, tinnitus, trouble swallowing and voice change.    Eyes: Negative for photophobia, pain, discharge, redness, itching and visual disturbance.   Respiratory: Negative for apnea, cough, choking, chest tightness, shortness of breath, wheezing and stridor.    Cardiovascular: Negative for chest pain, palpitations and leg swelling.   Gastrointestinal: Negative for abdominal distention, abdominal pain, anal bleeding, blood in stool, constipation, diarrhea, nausea, rectal pain and vomiting.   Endocrine: Negative for cold intolerance, heat intolerance, polydipsia, polyphagia and polyuria.   Genitourinary: Negative for decreased urine volume, difficulty urinating, dysuria, enuresis, flank pain, frequency, genital sores, hematuria and urgency.   Musculoskeletal: Positive for arthralgias. Negative for back pain, gait problem, joint swelling, myalgias, neck pain and neck stiffness.   Skin: Negative for color change, pallor, rash and wound.   Allergic/Immunologic: Negative for environmental allergies, food allergies and immunocompromised state.   Neurological: Negative for dizziness, tremors, seizures, syncope, facial asymmetry, speech difficulty, weakness, light-headedness, numbness and headaches.   Hematological: Negative for adenopathy. Does not bruise/bleed easily.   Psychiatric/Behavioral: Negative for agitation, behavioral problems, confusion, decreased concentration, dysphoric mood, hallucinations, self-injury, sleep disturbance and suicidal ideas. The patient is not nervous/anxious and is not hyperactive.         Objective      Physical Exam  /86   Ht 170.2 cm (67.01\")   Wt 92.8 kg (204 lb 9.4 oz)   BMI 32.04 kg/m²     Body mass index is 32.04 kg/m².    General:   Mental Status:  Alert   Appearance: Cooperative, in no acute distress   Build and Nutrition: Well-nourished " well-developed male   Orientation: Alert and oriented to person, place and time   Posture: Normal   Gait: Nonantalgic    Integument:   Right knee: no skin lesions, no rash, no ecchymosis   Left knee: no skin lesions, no rash, no ecchymosis    Lower Extremities:   Right Knee:    Tenderness:  None    Effusion:  None    Swelling:  None    Crepitus:  Positive    Atrophy:  None    Range of motion:  Extension: 0°       Flexion: 130°  Instability:  No varus laxity, no valgus laxity, negative anterior drawer  Deformities:  None   Left Knee:    Tenderness:  None    Effusion:  None    Swelling:  None    Crepitus: Positive    Atrophy:  None    Range of motion:  Extension: 0°       Flexion: 125°  Instability:  No varus laxity, no valgus laxity, negative anterior drawer  Deformities:  Varus      Imaging/Studies  Imaging Results (Last 24 Hours)     Procedure Component Value Units Date/Time    XR Knee 4+ View Right [[id number]] Resulted: 08/08/22 1626     Updated: 08/08/22 1627    Narrative:      Right Knee Radiographs  Indication: right knee pain  Views: Standing AP's and skiers of both knees, with lateral and sunrise   views of the right knee    Comparison: no prior studies available    Findings:   Mild medial joint space narrowing, tricompartmental degeneration, no acute   bony abnormalities.  No unusual bony features.            Assessment and Plan     Diagnoses and all orders for this visit:    1. Chronic pain of right knee (Primary)  -     Cancel: XR Knee 3+ View With [place] Right  -     XR Knee 4+ View Right    2. Primary osteoarthritis of left knee        1. Chronic pain of right knee    2. Primary osteoarthritis of left knee        I reviewed my findings with the patient.  Left knee did well following the viscosupplementation injections, no further intervention is required at this time.  Also, regarding his right knee, I see no acute trauma.  No intrinsic pain to the knee today, but he wanted to have it checked out after  his fall.  He sees Dr. Bull for sciatica in the right lower extremity.    Return if symptoms worsen or fail to improve.      Magan Washington MD  08/08/22  16:36 EDT

## 2023-10-19 ENCOUNTER — TELEPHONE (OUTPATIENT)
Dept: ORTHOPEDIC SURGERY | Facility: CLINIC | Age: 78
End: 2023-10-19
Payer: MEDICARE

## 2023-10-19 DIAGNOSIS — M17.12 PRIMARY OSTEOARTHRITIS OF LEFT KNEE: Primary | ICD-10-CM

## 2023-10-19 NOTE — TELEPHONE ENCOUNTER
Caller: Joni Hoffman    Relationship to patient: Self    Best call back number: [phone]    Chief complaint: LEFT KNEE    Type of visit: GEL INJECTION     Requested date: SAME DAY AS WIFE  11-13-23 MRN: [id number]         Additional notes: PATIENT AND WIFE LIVE TWO HOURS AWAY AND WOULD LIKE TO COME IN AT SAME TIME. PATIENT CHECKED WITH HIS INSURANCE WHO STATED THEY WILL COVER GEL INJECTION .

## 2023-10-25 NOTE — TELEPHONE ENCOUNTER
Provider: COLLIN    Caller: MADINA BAEZ    Relationship to Patient: SPOUSE    Phone Number: [phone]    Reason for Call: PATIENT'S SPOUSE CALLING TO CHECK ON STATUS OF SCHEDULING FOR GEL INJECTIONS ON 11/13/23

## 2023-10-26 NOTE — TELEPHONE ENCOUNTER
Referral coordinators are working in order from when the orders are received to get auth. They have not gotten approval from insurance as of right now but it will get worked on asap.

## 2023-11-20 ENCOUNTER — CLINICAL SUPPORT (OUTPATIENT)
Dept: ORTHOPEDIC SURGERY | Facility: CLINIC | Age: 78
End: 2023-11-20
Payer: MEDICARE

## 2023-11-20 DIAGNOSIS — M17.12 PRIMARY OSTEOARTHRITIS OF LEFT KNEE: Primary | ICD-10-CM

## 2023-11-20 PROCEDURE — 20610 DRAIN/INJ JOINT/BURSA W/O US: CPT | Performed by: ORTHOPAEDIC SURGERY

## 2023-11-20 NOTE — PROGRESS NOTES
Procedure   Large Joint Arthrocentesis: L knee  Date/Time: 11/20/2023 3:44 PM  Consent given by: patient  Site marked: site marked  Timeout: Immediately prior to procedure a time out was called to verify the correct patient, procedure, equipment, support staff and site/side marked as required   Supporting Documentation  Indications: pain   Procedure Details  Location: knee - L knee  Preparation: Patient was prepped and draped in the usual sterile fashion  Needle gauge: 21g.  Approach: anterolateral  Medications administered: 30 mg Hyaluronan 30 MG/2ML  Patient tolerance: patient tolerated the procedure well with no immediate complications

## 2023-11-20 NOTE — PROGRESS NOTES
[facility] [place] Orthopaedic Surgery Clinic Note    Subjective     Chief Complaint   Patient presents with    Follow-up     Left knee orthovisc #1 injection        HPI    Joni Hoffman is a 78 y.o. male who presents for the first Orthovisc injection into the left knee.    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    Recurrent herniation of lumbar disc    Bulging lumbar disc    Actinic keratosis    Chronic heart disease    Encounter for long-term (current) use of insulin    Hypertensive disorder    Senile hyperkeratosis     Past Medical History:   Diagnosis Date    Acid indigestion     related to meds    Arthritis     Coronary artery disease 2000    s/p 2 stents after MI     Diabetes mellitus 2007    po meds; checks blood sugars at home run     Disease of thyroid gland     hypothyroidism     Hearing loss     uses hearing aids     History of kidney stones     passed    Hypertension     Lumbar back pain     MI, old 2000    2 stents inserted    Wears glasses     Wears hearing aid       Past Surgical History:   Procedure Laterality Date    APPENDECTOMY      CATARACT EXTRACTION WITH INTRAOCULAR LENS IMPLANT Bilateral     COLONOSCOPY      CORONARY STENT PLACEMENT  2000    x2    INTERVENTIONAL RADIOLOGY PROCEDURE N/A 12/31/2019    Procedure: myelogram lumbar spine;  Surgeon: Karan Pennington MD;  Location:  [facility] CATH INVASIVE LOCATION;  Service: Interventional Radiology    LUMBAR DISCECTOMY Left 10/27/2017    Procedure: LUMBAR DISCECTOMY L5-S1 LEFT ;  Surgeon: Elbert De Anda MD;  Location:  [facility] OR;  Service:     LUMBAR DISCECTOMY Left 2/19/2020    Procedure: RE-DO LUMBAR DISCECTOMY L5-S1 LEFT;  Surgeon: Elbert De Anda MD;  Location:  [facility] OR;  Service: Neurosurgery;  Laterality: Left;    NASAL POLYP SURGERY        Family History   Problem Relation Age of Onset    Heart disease Mother     Heart disease Father     Diabetes Sister     Heart disease Brother      Social  History     Socioeconomic History    Marital status:    Tobacco Use    Smoking status: Former     Packs/day: 1.00     Years: 25.00     Additional pack years: 0.00     Total pack years: 25.00     Types: Cigarettes     Quit date:      Years since quittin.9    Smokeless tobacco: Current     Types: Chew    Tobacco comments:     hx of cigarette use for 25 years and chewed about 25 years (currently chews on occasion)    Vaping Use    Vaping Use: Never used   Substance and Sexual Activity    Alcohol use: No    Drug use: No    Sexual activity: Defer      Current Outpatient Medications on File Prior to Visit   Medication Sig Dispense Refill    aspirin 81 MG EC tablet Take 81 mg by mouth Daily.      atenolol (TENORMIN) 25 MG tablet Take 25 mg by mouth Daily With Breakfast.  3    chlorhexidine (PERIDEX) 0.12 % solution RINSE 1/2 OUNCE (15 ML) IN MOUTH FOR 30 SECONDS THEN SPIT TWICE DAILY      cyclobenzaprine (FLEXERIL) 5 MG tablet       doxycycline (MONODOX) 100 MG capsule Take 100 mg by mouth 2 (Two) Times a Day.      Droplet Pen Needles 32G X 4 MM misc       famotidine (PEPCID) 10 MG tablet Take 10 mg by mouth Every Morning.      glimepiride (AMARYL) 4 MG tablet Take 4 mg by mouth 2 (Two) Times a Day. Takes 4 mg in the am and 2 mg in the PM  1    HYDROcodone-acetaminophen (NORCO) 5-325 MG per tablet Take 1 tablet by mouth 2 (Two) Times a Day.      ibuprofen (ADVIL,MOTRIN) 600 MG tablet TAKE 1 TABLET BY MOUTH THREE TIMES DAILY AS NEEDED FOR BACK PAIN *TAKE WITH ACETAMINOPHEN      levothyroxine (SYNTHROID, LEVOTHROID) 137 MCG tablet TAKE 1 TABLET BY MOUTH EVERY MORNING ON EMPTY STOMACH      mometasone (NASONEX) 50 MCG/ACT nasal spray 2 sprays into the nostril(s) as directed by provider Every Morning.      montelukast (SINGULAIR) 10 MG tablet Take 10 mg by mouth Every Night.      Nutritional Supplements (JUICE PLUS FIBRE PO) Take 1 dose by mouth Every Morning.      Repatha SureClick solution auto-injector  SureClick injection ADMINISTER 1 INJECTION UNDER THE SKIN EVERY 2 WEEKS      rosuvastatin (CRESTOR) 20 MG tablet       tamsulosin (FLOMAX) 0.4 MG capsule 24 hr capsule       tiZANidine (ZANAFLEX) 4 MG tablet tizanidine 4 mg tablet   TAKE 1 TABLET BY MOUTH THREE TIMES DAILY AS NEEDED FOR BACK      traMADol (ULTRAM) 50 MG tablet TAKE 1 TABLET BY MOUTH 4 TIMES DAILY AS NEEDED FOR PAIN      trandolapril (MAVIK) 4 MG tablet Take 4 mg by mouth Every Morning.  3    triamterene-hydrochlorothiazide (DYAZIDE) 37.5-25 MG per capsule       True Metrix Blood Glucose Test test strip       TRUEplus Lancets 30G misc        No current facility-administered medications on file prior to visit.      Allergies   Allergen Reactions    Hydrocodone-Acetaminophen GI Intolerance    Penicillins Rash        Review of Systems   Constitutional:  Negative for activity change, appetite change, chills, diaphoresis, fatigue, fever and unexpected weight change.   HENT:  Negative for congestion, dental problem, drooling, ear discharge, ear pain, facial swelling, hearing loss, mouth sores, nosebleeds, postnasal drip, rhinorrhea, sinus pressure, sneezing, sore throat, tinnitus, trouble swallowing and voice change.    Eyes:  Negative for photophobia, pain, discharge, redness, itching and visual disturbance.   Respiratory:  Negative for apnea, cough, choking, chest tightness, shortness of breath, wheezing and stridor.    Cardiovascular:  Negative for chest pain, palpitations and leg swelling.   Gastrointestinal:  Negative for abdominal distention, abdominal pain, anal bleeding, blood in stool, constipation, diarrhea, nausea, rectal pain and vomiting.   Endocrine: Negative for cold intolerance, heat intolerance, polydipsia, polyphagia and polyuria.   Genitourinary:  Negative for decreased urine volume, difficulty urinating, dysuria, enuresis, flank pain, frequency, genital sores, hematuria and urgency.   Musculoskeletal:  Positive for arthralgias. Negative  for back pain, gait problem, joint swelling, myalgias, neck pain and neck stiffness.   Skin:  Negative for color change, pallor, rash and wound.   Allergic/Immunologic: Negative for environmental allergies, food allergies and immunocompromised state.   Neurological:  Negative for dizziness, tremors, seizures, syncope, facial asymmetry, speech difficulty, weakness, light-headedness, numbness and headaches.   Hematological:  Negative for adenopathy. Does not bruise/bleed easily.   Psychiatric/Behavioral:  Negative for agitation, behavioral problems, confusion, decreased concentration, dysphoric mood, hallucinations, self-injury, sleep disturbance and suicidal ideas. The patient is not nervous/anxious and is not hyperactive.         Objective      Physical Exam  There were no vitals taken for this visit.    There is no height or weight on file to calculate BMI.    General:   Mental Status:  Alert   Appearance: Cooperative, in no acute distress   Posture: Normal    Assessment and Plan     Diagnoses and all orders for this visit:    1. Primary osteoarthritis of left knee (Primary)  -     Large Joint Arthrocentesis: L knee        1. Primary osteoarthritis of left knee        Procedure Note:  The potential benefits of performing a therapeutic knee joint visco supplementation injection, as well as potential risks (including, but not limited to infection, swelling, pain, bleeding, bruising, nerve/blood vessel damage, and pseudoseptic reaction) have been discussed with the patient.  After informed consent, timeout procedure was performed, and the skin on the left knee was prepped with chlorhexidine soap and alcohol, after which ethyl chloride was applied to the skin at the injection site. Via the anterolateral approach, Orthovisc was injected into the knee joint.  The patient tolerated the procedure well. There were no complications.  Band-Aid was applied to the injection site. Post-procedural instructions discussed with the  patient and/or their caregiver.    Return in about 1 week (around 11/27/2023) for Injection.    Magan Washington MD  11/20/23  16:06 EST

## 2023-11-27 ENCOUNTER — CLINICAL SUPPORT (OUTPATIENT)
Dept: ORTHOPEDIC SURGERY | Facility: CLINIC | Age: 78
End: 2023-11-27
Payer: MEDICARE

## 2023-11-27 DIAGNOSIS — M17.12 PRIMARY OSTEOARTHRITIS OF LEFT KNEE: Primary | ICD-10-CM

## 2023-11-27 PROCEDURE — 20610 DRAIN/INJ JOINT/BURSA W/O US: CPT | Performed by: ORTHOPAEDIC SURGERY

## 2023-11-27 RX ORDER — HUMAN INSULIN 100 [IU]/ML
INJECTION, SUSPENSION SUBCUTANEOUS
COMMUNITY
Start: 2023-11-21

## 2023-11-27 NOTE — PROGRESS NOTES
[facility] [place] Orthopaedic Surgery Clinic Note    Subjective     Chief Complaint   Patient presents with    Injections     Left knee -- orthovisc #2 injection        HPI    Joni Hoffman is a 78 y.o. male who presents for the second Orthovisc injection into the left knee.  Of note, he has seen some improvement so far.    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    Recurrent herniation of lumbar disc    Bulging lumbar disc    Actinic keratosis    Chronic heart disease    Encounter for long-term (current) use of insulin    Hypertensive disorder    Senile hyperkeratosis     Past Medical History:   Diagnosis Date    Acid indigestion     related to meds    Arthritis     Coronary artery disease 2000    s/p 2 stents after MI     Diabetes mellitus 2007    po meds; checks blood sugars at home run     Disease of thyroid gland     hypothyroidism     Hearing loss     uses hearing aids     History of kidney stones     passed    Hypertension     Lumbar back pain     MI, old 2000    2 stents inserted    Wears glasses     Wears hearing aid       Past Surgical History:   Procedure Laterality Date    APPENDECTOMY      CATARACT EXTRACTION WITH INTRAOCULAR LENS IMPLANT Bilateral     COLONOSCOPY      CORONARY STENT PLACEMENT  2000    x2    INTERVENTIONAL RADIOLOGY PROCEDURE N/A 12/31/2019    Procedure: myelogram lumbar spine;  Surgeon: Karan Pennington MD;  Location:  [facility] CATH INVASIVE LOCATION;  Service: Interventional Radiology    LUMBAR DISCECTOMY Left 10/27/2017    Procedure: LUMBAR DISCECTOMY L5-S1 LEFT ;  Surgeon: Elbert De Anda MD;  Location:  [facility];  Service:     LUMBAR DISCECTOMY Left 2/19/2020    Procedure: RE-DO LUMBAR DISCECTOMY L5-S1 LEFT;  Surgeon: Elbert De Anda MD;  Location:  [facility] OR;  Service: Neurosurgery;  Laterality: Left;    NASAL POLYP SURGERY        Family History   Problem Relation Age of Onset    Heart disease Mother     Heart disease Father      Diabetes Sister     Heart disease Brother      Social History     Socioeconomic History    Marital status:    Tobacco Use    Smoking status: Former     Packs/day: 1.00     Years: 25.00     Additional pack years: 0.00     Total pack years: 25.00     Types: Cigarettes     Quit date:      Years since quittin.9    Smokeless tobacco: Current     Types: Chew    Tobacco comments:     hx of cigarette use for 25 years and chewed about 25 years (currently chews on occasion)    Vaping Use    Vaping Use: Never used   Substance and Sexual Activity    Alcohol use: No    Drug use: No    Sexual activity: Defer      Current Outpatient Medications on File Prior to Visit   Medication Sig Dispense Refill    aspirin 81 MG EC tablet Take 1 tablet by mouth Daily.      atenolol (TENORMIN) 25 MG tablet Take 1 tablet by mouth Daily With Breakfast.  3    chlorhexidine (PERIDEX) 0.12 % solution RINSE 1/2 OUNCE (15 ML) IN MOUTH FOR 30 SECONDS THEN SPIT TWICE DAILY      cyclobenzaprine (FLEXERIL) 5 MG tablet       Droplet Pen Needles 32G X 4 MM misc       famotidine (PEPCID) 10 MG tablet Take 1 tablet by mouth Every Morning.      glimepiride (AMARYL) 4 MG tablet Take 1 tablet by mouth 2 (Two) Times a Day. Takes 4 mg in the am and 2 mg in the PM  1    ibuprofen (ADVIL,MOTRIN) 600 MG tablet TAKE 1 TABLET BY MOUTH THREE TIMES DAILY AS NEEDED FOR BACK PAIN *TAKE WITH ACETAMINOPHEN      levothyroxine (SYNTHROID, LEVOTHROID) 137 MCG tablet TAKE 1 TABLET BY MOUTH EVERY MORNING ON EMPTY STOMACH      mometasone (NASONEX) 50 MCG/ACT nasal spray 2 sprays into the nostril(s) as directed by provider Every Morning.      montelukast (SINGULAIR) 10 MG tablet Take 1 tablet by mouth Every Night.      NovoLIN 70/30 FlexPen (70-30) 100 UNIT/ML suspension pen-injector       Nutritional Supplements (JUICE PLUS FIBRE PO) Take 1 dose by mouth Every Morning.      Repatha SureClick solution auto-injector SureClick injection ADMINISTER 1 INJECTION UNDER  THE SKIN EVERY 2 WEEKS      rosuvastatin (CRESTOR) 20 MG tablet       tamsulosin (FLOMAX) 0.4 MG capsule 24 hr capsule       tiZANidine (ZANAFLEX) 4 MG tablet tizanidine 4 mg tablet   TAKE 1 TABLET BY MOUTH THREE TIMES DAILY AS NEEDED FOR BACK      trandolapril (MAVIK) 4 MG tablet Take 1 tablet by mouth Every Morning.  3    triamterene-hydrochlorothiazide (DYAZIDE) 37.5-25 MG per capsule       True Metrix Blood Glucose Test test strip       TRUEplus Lancets 30G misc       [DISCONTINUED] doxycycline (MONODOX) 100 MG capsule Take 100 mg by mouth 2 (Two) Times a Day.      [DISCONTINUED] HYDROcodone-acetaminophen (NORCO) 5-325 MG per tablet Take 1 tablet by mouth 2 (Two) Times a Day.      [DISCONTINUED] traMADol (ULTRAM) 50 MG tablet TAKE 1 TABLET BY MOUTH 4 TIMES DAILY AS NEEDED FOR PAIN       No current facility-administered medications on file prior to visit.      Allergies   Allergen Reactions    Hydrocodone-Acetaminophen GI Intolerance    Penicillins Rash        Review of Systems   Constitutional:  Negative for activity change, appetite change, chills, diaphoresis, fatigue, fever and unexpected weight change.   HENT:  Negative for congestion, dental problem, drooling, ear discharge, ear pain, facial swelling, hearing loss, mouth sores, nosebleeds, postnasal drip, rhinorrhea, sinus pressure, sneezing, sore throat, tinnitus, trouble swallowing and voice change.    Eyes:  Negative for photophobia, pain, discharge, redness, itching and visual disturbance.   Respiratory:  Negative for apnea, cough, choking, chest tightness, shortness of breath, wheezing and stridor.    Cardiovascular:  Negative for chest pain, palpitations and leg swelling.   Gastrointestinal:  Negative for abdominal distention, abdominal pain, anal bleeding, blood in stool, constipation, diarrhea, nausea, rectal pain and vomiting.   Endocrine: Negative for cold intolerance, heat intolerance, polydipsia, polyphagia and polyuria.   Genitourinary:   Negative for decreased urine volume, difficulty urinating, dysuria, enuresis, flank pain, frequency, genital sores, hematuria and urgency.   Musculoskeletal:  Positive for arthralgias. Negative for back pain, gait problem, joint swelling, myalgias, neck pain and neck stiffness.   Skin:  Negative for color change, pallor, rash and wound.   Allergic/Immunologic: Negative for environmental allergies, food allergies and immunocompromised state.   Neurological:  Negative for dizziness, tremors, seizures, syncope, facial asymmetry, speech difficulty, weakness, light-headedness, numbness and headaches.   Hematological:  Negative for adenopathy. Does not bruise/bleed easily.   Psychiatric/Behavioral:  Negative for agitation, behavioral problems, confusion, decreased concentration, dysphoric mood, hallucinations, self-injury, sleep disturbance and suicidal ideas. The patient is not nervous/anxious and is not hyperactive.         Objective      Physical Exam  There were no vitals taken for this visit.    There is no height or weight on file to calculate BMI.    General:   Mental Status:  Alert   Appearance: Cooperative, in no acute distress   Posture: Normal    Assessment and Plan     Diagnoses and all orders for this visit:    1. Primary osteoarthritis of left knee (Primary)  -     - Large Joint Arthrocentesis: L knee        1. Primary osteoarthritis of left knee        Procedure Note:  The potential benefits of performing a therapeutic knee joint visco supplementation injection, as well as potential risks (including, but not limited to infection, swelling, pain, bleeding, bruising, nerve/blood vessel damage, and pseudoseptic reaction) have been discussed with the patient.  After informed consent, timeout procedure was performed, and the skin on the left knee was prepped with chlorhexidine soap and alcohol, after which ethyl chloride was applied to the skin at the injection site. Via the anterolateral approach, Orthovisc was  injected into the knee joint.  The patient tolerated the procedure well. There were no complications.  Band-Aid was applied to the injection site. Post-procedural instructions discussed with the patient and/or their caregiver.    Return in about 1 week (around 12/4/2023) for Injection.    Magan Washington MD  11/27/23  14:03 EST

## 2023-11-27 NOTE — PROGRESS NOTES
Procedure   - Large Joint Arthrocentesis: L knee on 11/27/2023 1:49 PM  Indications: pain  Details: 21 G needle, anterolateral approach  Medications: 30 mg Hyaluronan 30 MG/2ML  Outcome: tolerated well, no immediate complications  Procedure, treatment alternatives, risks and benefits explained, specific risks discussed. Consent was given by the patient. Immediately prior to procedure a time out was called to verify the correct patient, procedure, equipment, support staff and site/side marked as required. Patient was prepped and draped in the usual sterile fashion.

## 2023-12-06 ENCOUNTER — CLINICAL SUPPORT (OUTPATIENT)
Dept: ORTHOPEDIC SURGERY | Facility: CLINIC | Age: 78
End: 2023-12-06
Payer: MEDICARE

## 2023-12-06 DIAGNOSIS — M17.12 PRIMARY OSTEOARTHRITIS OF LEFT KNEE: Primary | ICD-10-CM

## 2023-12-06 RX ORDER — LEVOFLOXACIN 500 MG/1
TABLET, FILM COATED ORAL
COMMUNITY
Start: 2023-11-30

## 2023-12-06 RX ORDER — FLUTICASONE PROPIONATE 50 MCG
SPRAY, SUSPENSION (ML) NASAL
COMMUNITY
Start: 2023-11-30

## 2023-12-06 NOTE — PROGRESS NOTES
[facility] Note    Subjective     Chief Complaint   Patient presents with    Injections     Orthovisc #3 left knee         HPI    Joni Hoffman is a 78 y.o. male who presents for the third and final Orthovisc injection in the left knee.  Of note he has seen some improvement.    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    Recurrent herniation of lumbar disc    Bulging lumbar disc    Actinic keratosis    Chronic heart disease    Encounter for long-term (current) use of insulin    Hypertensive disorder    Senile hyperkeratosis     Past Medical History:   Diagnosis Date    Acid indigestion     related to meds    Arthritis     Coronary artery disease 2000    s/p 2 stents after MI     Diabetes mellitus 2007    po meds; checks blood sugars at home run     Disease of thyroid gland     hypothyroidism     Hearing loss     uses hearing aids     History of kidney stones     passed    Hypertension     Lumbar back pain     MI, old 2000    2 stents inserted    Wears glasses     Wears hearing aid       Past Surgical History:   Procedure Laterality Date    APPENDECTOMY      CATARACT EXTRACTION WITH INTRAOCULAR LENS IMPLANT Bilateral     COLONOSCOPY      CORONARY STENT PLACEMENT  2000    x2    INTERVENTIONAL RADIOLOGY PROCEDURE N/A 12/31/2019    Procedure: myelogram lumbar spine;  Surgeon: Karan Pennington MD;  Location:  [facility];  Service: Interventional Radiology    LUMBAR DISCECTOMY Left 10/27/2017    Procedure: LUMBAR DISCECTOMY L5-S1 LEFT ;  Surgeon: Elbert De Anda MD;  Location:  [facility];  Service:     LUMBAR DISCECTOMY Left 2/19/2020    Procedure: RE-DO LUMBAR DISCECTOMY L5-S1 LEFT;  Surgeon: Elbert De Anda MD;  Location:  [facility];  Service: Neurosurgery;  Laterality: Left;    NASAL POLYP SURGERY        Family History   Problem Relation Age of Onset    Heart disease Mother     Heart disease Father     Diabetes Sister      Heart disease Brother      Social History     Socioeconomic History    Marital status:    Tobacco Use    Smoking status: Former     Packs/day: 1.00     Years: 25.00     Additional pack years: 0.00     Total pack years: 25.00     Types: Cigarettes     Quit date:      Years since quittin.9    Smokeless tobacco: Current     Types: Chew    Tobacco comments:     hx of cigarette use for 25 years and chewed about 25 years (currently chews on occasion)    Vaping Use    Vaping Use: Never used   Substance and Sexual Activity    Alcohol use: No    Drug use: No    Sexual activity: Defer      Current Outpatient Medications on File Prior to Visit   Medication Sig Dispense Refill    aspirin 81 MG EC tablet Take 1 tablet by mouth Daily.      atenolol (TENORMIN) 25 MG tablet Take 1 tablet by mouth Daily With Breakfast.  3    chlorhexidine (PERIDEX) 0.12 % solution RINSE 1/2 OUNCE (15 ML) IN MOUTH FOR 30 SECONDS THEN SPIT TWICE DAILY      cyclobenzaprine (FLEXERIL) 5 MG tablet       Droplet Pen Needles 32G X 4 MM misc       famotidine (PEPCID) 10 MG tablet Take 1 tablet by mouth Every Morning.      fluticasone (FLONASE) 50 MCG/ACT nasal spray       glimepiride (AMARYL) 4 MG tablet Take 1 tablet by mouth 2 (Two) Times a Day. Takes 4 mg in the am and 2 mg in the PM  1    ibuprofen (ADVIL,MOTRIN) 600 MG tablet TAKE 1 TABLET BY MOUTH THREE TIMES DAILY AS NEEDED FOR BACK PAIN *TAKE WITH ACETAMINOPHEN      levoFLOXacin (LEVAQUIN) 500 MG tablet       levothyroxine (SYNTHROID, LEVOTHROID) 137 MCG tablet TAKE 1 TABLET BY MOUTH EVERY MORNING ON EMPTY STOMACH      mometasone (NASONEX) 50 MCG/ACT nasal spray 2 sprays into the nostril(s) as directed by provider Every Morning.      montelukast (SINGULAIR) 10 MG tablet Take 1 tablet by mouth Every Night.      NovoLIN 70/30 FlexPen (70-30) 100 UNIT/ML suspension pen-injector       Nutritional Supplements (JUICE PLUS FIBRE PO) Take 1 dose by mouth Every Morning.      Repatha  SureClick solution auto-injector SureClick injection ADMINISTER 1 INJECTION UNDER THE SKIN EVERY 2 WEEKS      rosuvastatin (CRESTOR) 20 MG tablet       tamsulosin (FLOMAX) 0.4 MG capsule 24 hr capsule       tiZANidine (ZANAFLEX) 4 MG tablet tizanidine 4 mg tablet   TAKE 1 TABLET BY MOUTH THREE TIMES DAILY AS NEEDED FOR BACK      trandolapril (MAVIK) 4 MG tablet Take 1 tablet by mouth Every Morning.  3    triamterene-hydrochlorothiazide (DYAZIDE) 37.5-25 MG per capsule       True Metrix Blood Glucose Test test strip       TRUEplus Lancets 30G misc        No current facility-administered medications on file prior to visit.      Allergies   Allergen Reactions    Hydrocodone-Acetaminophen GI Intolerance    Penicillins Rash        Review of Systems   Constitutional: Negative.  Negative for activity change, appetite change, chills, diaphoresis, fatigue, fever and unexpected weight change.   HENT: Negative.  Negative for congestion, dental problem, drooling, ear discharge, ear pain, facial swelling, hearing loss, mouth sores, nosebleeds, postnasal drip, rhinorrhea, sinus pressure, sneezing, sore throat, tinnitus, trouble swallowing and voice change.    Eyes: Negative.  Negative for photophobia, pain, discharge, redness, itching and visual disturbance.   Respiratory: Negative.  Negative for apnea, cough, choking, chest tightness, shortness of breath, wheezing and stridor.    Cardiovascular: Negative.  Negative for chest pain, palpitations and leg swelling.   Gastrointestinal: Negative.  Negative for abdominal distention, abdominal pain, anal bleeding, blood in stool, constipation, diarrhea, nausea, rectal pain and vomiting.   Endocrine: Negative.  Negative for cold intolerance, heat intolerance, polydipsia, polyphagia and polyuria.   Genitourinary: Negative.  Negative for decreased urine volume, difficulty urinating, dysuria, enuresis, flank pain, frequency, genital sores, hematuria and urgency.   Musculoskeletal:  Positive  for arthralgias. Negative for back pain, gait problem, joint swelling, myalgias, neck pain and neck stiffness.   Skin: Negative.  Negative for color change, pallor, rash and wound.   Allergic/Immunologic: Negative.  Negative for environmental allergies, food allergies and immunocompromised state.   Neurological: Negative.  Negative for dizziness, tremors, seizures, syncope, facial asymmetry, speech difficulty, weakness, light-headedness, numbness and headaches.   Hematological: Negative.  Negative for adenopathy. Does not bruise/bleed easily.   Psychiatric/Behavioral: Negative.  Negative for agitation, behavioral problems, confusion, decreased concentration, dysphoric mood, hallucinations, self-injury, sleep disturbance and suicidal ideas. The patient is not nervous/anxious and is not hyperactive.         Objective      Physical Exam  There were no vitals taken for this visit.    There is no height or weight on file to calculate BMI.    General:   Mental Status:  Alert   Appearance: Cooperative, in no acute distress   Posture: Normal    Assessment and Plan     Diagnoses and all orders for this visit:    1. Primary osteoarthritis of left knee (Primary)  -     - Large Joint Arthrocentesis: L knee        1. Primary osteoarthritis of left knee        Procedure Note:  The potential benefits of performing a therapeutic knee joint visco supplementation injection, as well as potential risks (including, but not limited to infection, swelling, pain, bleeding, bruising, nerve/blood vessel damage, and pseudoseptic reaction) have been discussed with the patient.  After informed consent, timeout procedure was performed, and the skin on the left knee was prepped with chlorhexidine soap and alcohol, after which ethyl chloride was applied to the skin at the injection site. Via the anterolateral approach, Orthovisc was injected into the knee joint.  The patient tolerated the procedure well. There were no complications.  Band-Aid was  applied to the injection site. Post-procedural instructions discussed with the patient and/or their caregiver.    Return in about 4 months (around 4/6/2024).    Magan Washington MD  12/06/23  13:42 EST

## 2023-12-06 NOTE — PROGRESS NOTES
Procedure   - Large Joint Arthrocentesis: L knee on 12/6/2023 1:33 PM  Indications: pain  Details: 21 G needle, anterolateral approach  Medications: 30 mg Hyaluronan 30 MG/2ML  Outcome: tolerated well, no immediate complications  Procedure, treatment alternatives, risks and benefits explained, specific risks discussed. Consent was given by the patient. Immediately prior to procedure a time out was called to verify the correct patient, procedure, equipment, support staff and site/side marked as required. Patient was prepped and draped in the usual sterile fashion.

## 2023-12-12 ENCOUNTER — HOSPITAL ENCOUNTER (INPATIENT)
Facility: HOSPITAL | Age: 78
LOS: 7 days | Discharge: HOME | End: 2023-12-20
Attending: EMERGENCY MEDICINE | Admitting: THORACIC SURGERY (CARDIOTHORACIC VASCULAR SURGERY)
Payer: MEDICARE

## 2023-12-12 ENCOUNTER — APPOINTMENT (OUTPATIENT)
Dept: GENERAL RADIOLOGY | Facility: HOSPITAL | Age: 78
End: 2023-12-12
Payer: MEDICARE

## 2023-12-12 DIAGNOSIS — Z86.79 HISTORY OF HYPERTENSION: ICD-10-CM

## 2023-12-12 DIAGNOSIS — Z86.39 HISTORY OF HYPOTHYROIDISM: ICD-10-CM

## 2023-12-12 DIAGNOSIS — Z86.39 HISTORY OF HYPERLIPIDEMIA: ICD-10-CM

## 2023-12-12 DIAGNOSIS — I21.4 NSTEMI, INITIAL EPISODE OF CARE: ICD-10-CM

## 2023-12-12 DIAGNOSIS — I21.4 NSTEMI (NON-ST ELEVATED MYOCARDIAL INFARCTION): Primary | ICD-10-CM

## 2023-12-12 DIAGNOSIS — Z86.79 HISTORY OF CORONARY ARTERY DISEASE: ICD-10-CM

## 2023-12-12 DIAGNOSIS — Z86.39 HISTORY OF DIABETES MELLITUS: ICD-10-CM

## 2023-12-12 DIAGNOSIS — I25.110 CORONARY ARTERY DISEASE INVOLVING NATIVE CORONARY ARTERY OF NATIVE HEART WITH UNSTABLE ANGINA PECTORIS: ICD-10-CM

## 2023-12-12 DIAGNOSIS — Z95.1 S/P CABG (CORONARY ARTERY BYPASS GRAFT): ICD-10-CM

## 2023-12-12 PROBLEM — E11.9 T2DM (TYPE 2 DIABETES MELLITUS): Status: ACTIVE | Noted: 2023-12-12

## 2023-12-12 PROBLEM — I25.10 CAD (CORONARY ARTERY DISEASE): Status: ACTIVE | Noted: 2023-12-12

## 2023-12-12 PROBLEM — E03.9 HYPOTHYROIDISM (ACQUIRED): Status: ACTIVE | Noted: 2023-12-12

## 2023-12-12 LAB
ALBUMIN SERPL-MCNC: 4 G/DL (ref 3.5–5.2)
ALBUMIN/GLOB SERPL: 1.3 G/DL
ALP SERPL-CCNC: 72 U/L (ref 39–117)
ALT SERPL W P-5'-P-CCNC: 18 U/L (ref 1–41)
ANION GAP SERPL CALCULATED.3IONS-SCNC: 11 MMOL/L (ref 5–15)
APTT PPP: 33.5 SECONDS (ref 60–90)
AST SERPL-CCNC: 20 U/L (ref 1–40)
BASOPHILS # BLD AUTO: 0.06 10*3/MM3 (ref 0–0.2)
BASOPHILS NFR BLD AUTO: 0.7 % (ref 0–1.5)
BILIRUB SERPL-MCNC: 0.2 MG/DL (ref 0–1.2)
BUN SERPL-MCNC: 24 MG/DL (ref 8–23)
BUN/CREAT SERPL: 19.2 (ref 7–25)
CALCIUM SPEC-SCNC: 9.5 MG/DL (ref 8.6–10.5)
CHLORIDE SERPL-SCNC: 102 MMOL/L (ref 98–107)
CO2 SERPL-SCNC: 29 MMOL/L (ref 22–29)
CREAT SERPL-MCNC: 1.25 MG/DL (ref 0.76–1.27)
D DIMER PPP FEU-MCNC: 0.32 MCGFEU/ML (ref 0–0.78)
DEPRECATED RDW RBC AUTO: 44.1 FL (ref 37–54)
EGFRCR SERPLBLD CKD-EPI 2021: 58.9 ML/MIN/1.73
EOSINOPHIL # BLD AUTO: 0.67 10*3/MM3 (ref 0–0.4)
EOSINOPHIL NFR BLD AUTO: 7.4 % (ref 0.3–6.2)
ERYTHROCYTE [DISTWIDTH] IN BLOOD BY AUTOMATED COUNT: 12.9 % (ref 12.3–15.4)
GEN 5 2HR TROPONIN T REFLEX: 140 NG/L
GLOBULIN UR ELPH-MCNC: 3 GM/DL
GLUCOSE SERPL-MCNC: 188 MG/DL (ref 65–99)
HCT VFR BLD AUTO: 47.3 % (ref 37.5–51)
HGB BLD-MCNC: 15.4 G/DL (ref 13–17.7)
HOLD SPECIMEN: NORMAL
IMM GRANULOCYTES # BLD AUTO: 0.02 10*3/MM3 (ref 0–0.05)
IMM GRANULOCYTES NFR BLD AUTO: 0.2 % (ref 0–0.5)
INR PPP: 0.93 (ref 0.89–1.12)
LIPASE SERPL-CCNC: 41 U/L (ref 13–60)
LYMPHOCYTES # BLD AUTO: 1.9 10*3/MM3 (ref 0.7–3.1)
LYMPHOCYTES NFR BLD AUTO: 21.1 % (ref 19.6–45.3)
MCH RBC QN AUTO: 30.4 PG (ref 26.6–33)
MCHC RBC AUTO-ENTMCNC: 32.6 G/DL (ref 31.5–35.7)
MCV RBC AUTO: 93.5 FL (ref 79–97)
MONOCYTES # BLD AUTO: 1.09 10*3/MM3 (ref 0.1–0.9)
MONOCYTES NFR BLD AUTO: 12.1 % (ref 5–12)
NEUTROPHILS NFR BLD AUTO: 5.27 10*3/MM3 (ref 1.7–7)
NEUTROPHILS NFR BLD AUTO: 58.5 % (ref 42.7–76)
NRBC BLD AUTO-RTO: 0 /100 WBC (ref 0–0.2)
NT-PROBNP SERPL-MCNC: 733.6 PG/ML (ref 0–1800)
PLATELET # BLD AUTO: 200 10*3/MM3 (ref 140–450)
PMV BLD AUTO: 9.7 FL (ref 6–12)
POTASSIUM SERPL-SCNC: 4.4 MMOL/L (ref 3.5–5.2)
PROT SERPL-MCNC: 7 G/DL (ref 6–8.5)
PROTHROMBIN TIME: 12.5 SECONDS (ref 12.2–14.5)
RBC # BLD AUTO: 5.06 10*6/MM3 (ref 4.14–5.8)
SODIUM SERPL-SCNC: 142 MMOL/L (ref 136–145)
TROPONIN T DELTA: 16 NG/L
TROPONIN T SERPL HS-MCNC: 124 NG/L
UFH PPP CHRO-ACNC: 0.1 IU/ML (ref 0.3–0.7)
WBC NRBC COR # BLD AUTO: 9.01 10*3/MM3 (ref 3.4–10.8)
WHOLE BLOOD HOLD COAG: NORMAL
WHOLE BLOOD HOLD SPECIMEN: NORMAL

## 2023-12-12 PROCEDURE — 99222 1ST HOSP IP/OBS MODERATE 55: CPT | Performed by: NURSE PRACTITIONER

## 2023-12-12 PROCEDURE — 84484 ASSAY OF TROPONIN QUANT: CPT

## 2023-12-12 PROCEDURE — 93005 ELECTROCARDIOGRAM TRACING: CPT | Performed by: EMERGENCY MEDICINE

## 2023-12-12 PROCEDURE — 25010000002 HEPARIN (PORCINE) 25000-0.45 UT/250ML-% SOLUTION: Performed by: PHYSICIAN ASSISTANT

## 2023-12-12 PROCEDURE — 85025 COMPLETE CBC W/AUTO DIFF WBC: CPT

## 2023-12-12 PROCEDURE — 71045 X-RAY EXAM CHEST 1 VIEW: CPT

## 2023-12-12 PROCEDURE — G0378 HOSPITAL OBSERVATION PER HR: HCPCS

## 2023-12-12 PROCEDURE — 84145 PROCALCITONIN (PCT): CPT | Performed by: NURSE PRACTITIONER

## 2023-12-12 PROCEDURE — 36415 COLL VENOUS BLD VENIPUNCTURE: CPT

## 2023-12-12 PROCEDURE — 83690 ASSAY OF LIPASE: CPT

## 2023-12-12 PROCEDURE — 99291 CRITICAL CARE FIRST HOUR: CPT

## 2023-12-12 PROCEDURE — 85520 HEPARIN ASSAY: CPT | Performed by: PHYSICIAN ASSISTANT

## 2023-12-12 PROCEDURE — 25010000002 HEPARIN (PORCINE) PER 1000 UNITS: Performed by: PHYSICIAN ASSISTANT

## 2023-12-12 PROCEDURE — 84484 ASSAY OF TROPONIN QUANT: CPT | Performed by: EMERGENCY MEDICINE

## 2023-12-12 PROCEDURE — 83880 ASSAY OF NATRIURETIC PEPTIDE: CPT

## 2023-12-12 PROCEDURE — 85730 THROMBOPLASTIN TIME PARTIAL: CPT | Performed by: PHYSICIAN ASSISTANT

## 2023-12-12 PROCEDURE — 85379 FIBRIN DEGRADATION QUANT: CPT | Performed by: PHYSICIAN ASSISTANT

## 2023-12-12 PROCEDURE — 93005 ELECTROCARDIOGRAM TRACING: CPT

## 2023-12-12 PROCEDURE — 99285 EMERGENCY DEPT VISIT HI MDM: CPT

## 2023-12-12 PROCEDURE — 80053 COMPREHEN METABOLIC PANEL: CPT

## 2023-12-12 PROCEDURE — 85610 PROTHROMBIN TIME: CPT | Performed by: PHYSICIAN ASSISTANT

## 2023-12-12 RX ORDER — SODIUM CHLORIDE 0.9 % (FLUSH) 0.9 %
10 SYRINGE (ML) INJECTION AS NEEDED
Status: DISCONTINUED | OUTPATIENT
Start: 2023-12-12 | End: 2023-12-15

## 2023-12-12 RX ORDER — CLOPIDOGREL BISULFATE 75 MG/1
300 TABLET ORAL ONCE
Status: COMPLETED | OUTPATIENT
Start: 2023-12-12 | End: 2023-12-12

## 2023-12-12 RX ORDER — HEPARIN SODIUM 1000 [USP'U]/ML
2000 INJECTION, SOLUTION INTRAVENOUS; SUBCUTANEOUS AS NEEDED
Status: DISCONTINUED | OUTPATIENT
Start: 2023-12-12 | End: 2023-12-12

## 2023-12-12 RX ORDER — HEPARIN SODIUM 1000 [USP'U]/ML
4000 INJECTION, SOLUTION INTRAVENOUS; SUBCUTANEOUS AS NEEDED
Status: DISCONTINUED | OUTPATIENT
Start: 2023-12-12 | End: 2023-12-12

## 2023-12-12 RX ORDER — HEPARIN SODIUM 10000 [USP'U]/100ML
12 INJECTION, SOLUTION INTRAVENOUS
Status: DISCONTINUED | OUTPATIENT
Start: 2023-12-12 | End: 2023-12-13

## 2023-12-12 RX ORDER — ASPIRIN 81 MG/1
324 TABLET, CHEWABLE ORAL ONCE
Status: COMPLETED | OUTPATIENT
Start: 2023-12-12 | End: 2023-12-12

## 2023-12-12 RX ORDER — HEPARIN SODIUM 1000 [USP'U]/ML
4000 INJECTION, SOLUTION INTRAVENOUS; SUBCUTANEOUS ONCE
Qty: 10 ML | Refills: 0 | Status: COMPLETED | OUTPATIENT
Start: 2023-12-12 | End: 2023-12-12

## 2023-12-12 RX ADMIN — HEPARIN SODIUM 10.8 UNITS/KG/HR: 10000 INJECTION, SOLUTION INTRAVENOUS at 21:29

## 2023-12-12 RX ADMIN — CLOPIDOGREL BISULFATE 300 MG: 75 TABLET ORAL at 21:28

## 2023-12-12 RX ADMIN — ASPIRIN 81 MG CHEWABLE TABLET 324 MG: 81 TABLET CHEWABLE at 21:32

## 2023-12-12 RX ADMIN — HEPARIN SODIUM 4000 UNITS: 1000 INJECTION INTRAVENOUS; SUBCUTANEOUS at 21:23

## 2023-12-12 RX ADMIN — NITROGLYCERIN 1 INCH: 20 OINTMENT TOPICAL at 21:28

## 2023-12-12 NOTE — Clinical Note
Level of Care: Telemetry [5]   Diagnosis: NSTEMI (non-ST elevated myocardial infarction) [187299]   Admitting Physician: JENNIFER CHRISTENSEN [[id number]]   Attending Physician: JENNIFER CHRISTENSEN [[id number]]   Bed Request Comments: tele

## 2023-12-13 ENCOUNTER — APPOINTMENT (OUTPATIENT)
Dept: PULMONOLOGY | Facility: HOSPITAL | Age: 78
End: 2023-12-13
Payer: MEDICARE

## 2023-12-13 ENCOUNTER — APPOINTMENT (OUTPATIENT)
Dept: CARDIOLOGY | Facility: HOSPITAL | Age: 78
End: 2023-12-13
Payer: MEDICARE

## 2023-12-13 ENCOUNTER — ANESTHESIA EVENT (OUTPATIENT)
Dept: PERIOP | Facility: HOSPITAL | Age: 78
End: 2023-12-13
Payer: MEDICARE

## 2023-12-13 PROBLEM — E78.5 HLD (HYPERLIPIDEMIA): Status: ACTIVE | Noted: 2023-12-13

## 2023-12-13 PROBLEM — G47.34 NOCTURNAL HYPOXIA: Status: ACTIVE | Noted: 2023-12-13

## 2023-12-13 PROBLEM — I21.4 NSTEMI, INITIAL EPISODE OF CARE: Status: ACTIVE | Noted: 2023-12-12

## 2023-12-13 LAB
ABO GROUP BLD: NORMAL
ABO GROUP BLD: NORMAL
ANION GAP SERPL CALCULATED.3IONS-SCNC: 10 MMOL/L (ref 5–15)
BASOPHILS # BLD AUTO: 0.05 10*3/MM3 (ref 0–0.2)
BASOPHILS NFR BLD AUTO: 0.7 % (ref 0–1.5)
BH CV ECHO MEAS - AO MAX PG: 5.3 MMHG
BH CV ECHO MEAS - AO MEAN PG: 3 MMHG
BH CV ECHO MEAS - AO ROOT DIAM: 3 CM
BH CV ECHO MEAS - AO V2 MAX: 115.3 CM/SEC
BH CV ECHO MEAS - AO V2 VTI: 22.2 CM
BH CV ECHO MEAS - AVA(I,D): 2.8 CM2
BH CV ECHO MEAS - EDV(CUBED): 117.6 ML
BH CV ECHO MEAS - EDV(MOD-SP2): 76.8 ML
BH CV ECHO MEAS - EDV(MOD-SP4): 34.1 ML
BH CV ECHO MEAS - EF(MOD-BP): 64 %
BH CV ECHO MEAS - EF(MOD-SP2): 75.1 %
BH CV ECHO MEAS - EF(MOD-SP4): 81.3 %
BH CV ECHO MEAS - ESV(CUBED): 32.8 ML
BH CV ECHO MEAS - ESV(MOD-SP2): 19.1 ML
BH CV ECHO MEAS - ESV(MOD-SP4): 6.4 ML
BH CV ECHO MEAS - FS: 34.7 %
BH CV ECHO MEAS - IVS/LVPW: 1 CM
BH CV ECHO MEAS - IVSD: 1 CM
BH CV ECHO MEAS - LA DIMENSION: 3 CM
BH CV ECHO MEAS - LAT PEAK E' VEL: 5.2 CM/SEC
BH CV ECHO MEAS - LV DIASTOLIC VOL/BSA (35-75): 16.9 CM2
BH CV ECHO MEAS - LV MASS(C)D: 176 GRAMS
BH CV ECHO MEAS - LV MAX PG: 4.4 MMHG
BH CV ECHO MEAS - LV MEAN PG: 2.5 MMHG
BH CV ECHO MEAS - LV SYSTOLIC VOL/BSA (12-30): 3.2 CM2
BH CV ECHO MEAS - LV V1 MAX: 105 CM/SEC
BH CV ECHO MEAS - LV V1 VTI: 19.9 CM
BH CV ECHO MEAS - LVIDD: 4.9 CM
BH CV ECHO MEAS - LVIDS: 3.2 CM
BH CV ECHO MEAS - LVOT AREA: 3.1 CM2
BH CV ECHO MEAS - LVOT DIAM: 2 CM
BH CV ECHO MEAS - LVPWD: 1 CM
BH CV ECHO MEAS - MED PEAK E' VEL: 5.9 CM/SEC
BH CV ECHO MEAS - MV A MAX VEL: 88.8 CM/SEC
BH CV ECHO MEAS - MV DEC TIME: 0.2 SEC
BH CV ECHO MEAS - MV E MAX VEL: 60.3 CM/SEC
BH CV ECHO MEAS - MV E/A: 0.68
BH CV ECHO MEAS - MV MAX PG: 4.1 MMHG
BH CV ECHO MEAS - MV MEAN PG: 2 MMHG
BH CV ECHO MEAS - MV V2 VTI: 31.5 CM
BH CV ECHO MEAS - MVA(VTI): 1.98 CM2
BH CV ECHO MEAS - PA ACC TIME: 0.09 SEC
BH CV ECHO MEAS - PA V2 MAX: 121 CM/SEC
BH CV ECHO MEAS - RAP SYSTOLE: 3 MMHG
BH CV ECHO MEAS - RVSP: 19 MMHG
BH CV ECHO MEAS - SI(MOD-SP2): 28.6 ML/M2
BH CV ECHO MEAS - SI(MOD-SP4): 13.8 ML/M2
BH CV ECHO MEAS - SV(LVOT): 62.5 ML
BH CV ECHO MEAS - SV(MOD-SP2): 57.7 ML
BH CV ECHO MEAS - SV(MOD-SP4): 27.7 ML
BH CV ECHO MEAS - TAPSE (>1.6): 1.85 CM
BH CV ECHO MEAS - TR MAX PG: 6.5 MMHG
BH CV ECHO MEAS - TR MAX VEL: 125.5 CM/SEC
BH CV ECHO MEASUREMENTS AVERAGE E/E' RATIO: 10.86
BH CV XLRA - RV BASE: 3.7 CM
BH CV XLRA - RV LENGTH: 7.3 CM
BH CV XLRA - RV MID: 2.2 CM
BH CV XLRA - TDI S': 10.4 CM/SEC
BLD GP AB SCN SERPL QL: NEGATIVE
BUN SERPL-MCNC: 21 MG/DL (ref 8–23)
BUN/CREAT SERPL: 19.6 (ref 7–25)
CALCIUM SPEC-SCNC: 8.9 MG/DL (ref 8.6–10.5)
CHLORIDE SERPL-SCNC: 105 MMOL/L (ref 98–107)
CHOLEST SERPL-MCNC: 103 MG/DL (ref 0–200)
CO2 SERPL-SCNC: 26 MMOL/L (ref 22–29)
CREAT SERPL-MCNC: 1.07 MG/DL (ref 0.76–1.27)
D-LACTATE SERPL-SCNC: 0.8 MMOL/L (ref 0.5–2)
DEPRECATED RDW RBC AUTO: 42.2 FL (ref 37–54)
EGFRCR SERPLBLD CKD-EPI 2021: 71 ML/MIN/1.73
EOSINOPHIL # BLD AUTO: 0.63 10*3/MM3 (ref 0–0.4)
EOSINOPHIL NFR BLD AUTO: 8.7 % (ref 0.3–6.2)
ERYTHROCYTE [DISTWIDTH] IN BLOOD BY AUTOMATED COUNT: 12.7 % (ref 12.3–15.4)
GLUCOSE BLDC GLUCOMTR-MCNC: 105 MG/DL (ref 70–130)
GLUCOSE BLDC GLUCOMTR-MCNC: 133 MG/DL (ref 70–130)
GLUCOSE BLDC GLUCOMTR-MCNC: 154 MG/DL (ref 70–130)
GLUCOSE BLDC GLUCOMTR-MCNC: 214 MG/DL (ref 70–130)
GLUCOSE BLDC GLUCOMTR-MCNC: 228 MG/DL (ref 70–130)
GLUCOSE SERPL-MCNC: 105 MG/DL (ref 65–99)
HBA1C MFR BLD: 7 % (ref 4.8–5.6)
HCT VFR BLD AUTO: 41.7 % (ref 37.5–51)
HDLC SERPL-MCNC: 34 MG/DL (ref 40–60)
HGB BLD-MCNC: 13.9 G/DL (ref 13–17.7)
IMM GRANULOCYTES # BLD AUTO: 0.02 10*3/MM3 (ref 0–0.05)
IMM GRANULOCYTES NFR BLD AUTO: 0.3 % (ref 0–0.5)
LDLC SERPL CALC-MCNC: 47 MG/DL (ref 0–100)
LDLC/HDLC SERPL: 1.31 {RATIO}
LEFT ATRIUM VOLUME INDEX: 14.9 ML/M2
LYMPHOCYTES # BLD AUTO: 1.55 10*3/MM3 (ref 0.7–3.1)
LYMPHOCYTES NFR BLD AUTO: 21.4 % (ref 19.6–45.3)
MAGNESIUM SERPL-MCNC: 1.8 MG/DL (ref 1.6–2.4)
MCH RBC QN AUTO: 30.3 PG (ref 26.6–33)
MCHC RBC AUTO-ENTMCNC: 33.3 G/DL (ref 31.5–35.7)
MCV RBC AUTO: 90.8 FL (ref 79–97)
MONOCYTES # BLD AUTO: 0.9 10*3/MM3 (ref 0.1–0.9)
MONOCYTES NFR BLD AUTO: 12.4 % (ref 5–12)
NEUTROPHILS NFR BLD AUTO: 4.08 10*3/MM3 (ref 1.7–7)
NEUTROPHILS NFR BLD AUTO: 56.5 % (ref 42.7–76)
NRBC BLD AUTO-RTO: 0 /100 WBC (ref 0–0.2)
PA ADP PRP-ACNC: 145 PRU
PLATELET # BLD AUTO: 176 10*3/MM3 (ref 140–450)
PMV BLD AUTO: 9.6 FL (ref 6–12)
POTASSIUM SERPL-SCNC: 3.8 MMOL/L (ref 3.5–5.2)
PROCALCITONIN SERPL-MCNC: 0.07 NG/ML (ref 0–0.25)
QT INTERVAL: 380 MS
QTC INTERVAL: 424 MS
RBC # BLD AUTO: 4.59 10*6/MM3 (ref 4.14–5.8)
RH BLD: NEGATIVE
RH BLD: NEGATIVE
SODIUM SERPL-SCNC: 141 MMOL/L (ref 136–145)
T&S EXPIRATION DATE: NORMAL
TRIGL SERPL-MCNC: 122 MG/DL (ref 0–150)
TROPONIN T SERPL HS-MCNC: 154 NG/L
TSH SERPL DL<=0.05 MIU/L-ACNC: 2.64 UIU/ML (ref 0.27–4.2)
UFH PPP CHRO-ACNC: 0.1 IU/ML (ref 0.3–0.7)
UFH PPP CHRO-ACNC: 0.25 IU/ML (ref 0.3–0.7)
UFH PPP CHRO-ACNC: 0.3 IU/ML (ref 0.3–0.7)
VLDLC SERPL-MCNC: 22 MG/DL (ref 5–40)
WBC NRBC COR # BLD AUTO: 7.23 10*3/MM3 (ref 3.4–10.8)

## 2023-12-13 PROCEDURE — 93880 EXTRACRANIAL BILAT STUDY: CPT

## 2023-12-13 PROCEDURE — 85025 COMPLETE CBC W/AUTO DIFF WBC: CPT | Performed by: PHYSICIAN ASSISTANT

## 2023-12-13 PROCEDURE — 83036 HEMOGLOBIN GLYCOSYLATED A1C: CPT | Performed by: NURSE PRACTITIONER

## 2023-12-13 PROCEDURE — C1894 INTRO/SHEATH, NON-LASER: HCPCS | Performed by: INTERNAL MEDICINE

## 2023-12-13 PROCEDURE — 84443 ASSAY THYROID STIM HORMONE: CPT | Performed by: NURSE PRACTITIONER

## 2023-12-13 PROCEDURE — 93454 CORONARY ARTERY ANGIO S&I: CPT | Performed by: INTERNAL MEDICINE

## 2023-12-13 PROCEDURE — 99232 SBSQ HOSP IP/OBS MODERATE 35: CPT | Performed by: INTERNAL MEDICINE

## 2023-12-13 PROCEDURE — 94010 BREATHING CAPACITY TEST: CPT | Performed by: INTERNAL MEDICINE

## 2023-12-13 PROCEDURE — 83735 ASSAY OF MAGNESIUM: CPT | Performed by: NURSE PRACTITIONER

## 2023-12-13 PROCEDURE — 86901 BLOOD TYPING SEROLOGIC RH(D): CPT

## 2023-12-13 PROCEDURE — 93005 ELECTROCARDIOGRAM TRACING: CPT | Performed by: NURSE PRACTITIONER

## 2023-12-13 PROCEDURE — 93306 TTE W/DOPPLER COMPLETE: CPT

## 2023-12-13 PROCEDURE — 63710000001 INSULIN LISPRO (HUMAN) PER 5 UNITS: Performed by: INTERNAL MEDICINE

## 2023-12-13 PROCEDURE — 86850 RBC ANTIBODY SCREEN: CPT | Performed by: PHYSICIAN ASSISTANT

## 2023-12-13 PROCEDURE — 4A023N7 MEASUREMENT OF CARDIAC SAMPLING AND PRESSURE, LEFT HEART, PERCUTANEOUS APPROACH: ICD-10-PCS | Performed by: INTERNAL MEDICINE

## 2023-12-13 PROCEDURE — 93880 EXTRACRANIAL BILAT STUDY: CPT | Performed by: INTERNAL MEDICINE

## 2023-12-13 PROCEDURE — 80061 LIPID PANEL: CPT | Performed by: NURSE PRACTITIONER

## 2023-12-13 PROCEDURE — 93306 TTE W/DOPPLER COMPLETE: CPT | Performed by: INTERNAL MEDICINE

## 2023-12-13 PROCEDURE — 99222 1ST HOSP IP/OBS MODERATE 55: CPT | Performed by: PHYSICIAN ASSISTANT

## 2023-12-13 PROCEDURE — C1769 GUIDE WIRE: HCPCS | Performed by: INTERNAL MEDICINE

## 2023-12-13 PROCEDURE — 25010000002 FENTANYL CITRATE (PF) 50 MCG/ML SOLUTION: Performed by: INTERNAL MEDICINE

## 2023-12-13 PROCEDURE — B2111ZZ FLUOROSCOPY OF MULTIPLE CORONARY ARTERIES USING LOW OSMOLAR CONTRAST: ICD-10-PCS | Performed by: INTERNAL MEDICINE

## 2023-12-13 PROCEDURE — 25010000002 SULFUR HEXAFLUORIDE MICROSPH 60.7-25 MG RECONSTITUTED SUSPENSION: Performed by: PHYSICIAN ASSISTANT

## 2023-12-13 PROCEDURE — 86900 BLOOD TYPING SEROLOGIC ABO: CPT | Performed by: PHYSICIAN ASSISTANT

## 2023-12-13 PROCEDURE — 83605 ASSAY OF LACTIC ACID: CPT | Performed by: NURSE PRACTITIONER

## 2023-12-13 PROCEDURE — 85576 BLOOD PLATELET AGGREGATION: CPT | Performed by: PHYSICIAN ASSISTANT

## 2023-12-13 PROCEDURE — 93458 L HRT ARTERY/VENTRICLE ANGIO: CPT | Performed by: INTERNAL MEDICINE

## 2023-12-13 PROCEDURE — 94010 BREATHING CAPACITY TEST: CPT

## 2023-12-13 PROCEDURE — 86900 BLOOD TYPING SEROLOGIC ABO: CPT

## 2023-12-13 PROCEDURE — 63710000001 INSULIN DETEMIR PER 5 UNITS: Performed by: INTERNAL MEDICINE

## 2023-12-13 PROCEDURE — 84484 ASSAY OF TROPONIN QUANT: CPT | Performed by: NURSE PRACTITIONER

## 2023-12-13 PROCEDURE — 85520 HEPARIN ASSAY: CPT

## 2023-12-13 PROCEDURE — 25010000002 MIDAZOLAM PER 1 MG: Performed by: INTERNAL MEDICINE

## 2023-12-13 PROCEDURE — 93010 ELECTROCARDIOGRAM REPORT: CPT | Performed by: INTERNAL MEDICINE

## 2023-12-13 PROCEDURE — 86923 COMPATIBILITY TEST ELECTRIC: CPT

## 2023-12-13 PROCEDURE — 25810000003 SODIUM CHLORIDE 0.9 % SOLUTION: Performed by: INTERNAL MEDICINE

## 2023-12-13 PROCEDURE — 82948 REAGENT STRIP/BLOOD GLUCOSE: CPT

## 2023-12-13 PROCEDURE — 25510000001 IOPAMIDOL PER 1 ML: Performed by: INTERNAL MEDICINE

## 2023-12-13 PROCEDURE — 86901 BLOOD TYPING SEROLOGIC RH(D): CPT | Performed by: PHYSICIAN ASSISTANT

## 2023-12-13 PROCEDURE — 80048 BASIC METABOLIC PNL TOTAL CA: CPT | Performed by: NURSE PRACTITIONER

## 2023-12-13 PROCEDURE — 25010000002 HEPARIN (PORCINE) PER 1000 UNITS: Performed by: INTERNAL MEDICINE

## 2023-12-13 PROCEDURE — 85520 HEPARIN ASSAY: CPT | Performed by: INTERNAL MEDICINE

## 2023-12-13 RX ORDER — CHLORHEXIDINE GLUCONATE 500 MG/1
CLOTH TOPICAL EVERY 12 HOURS PRN
Status: DISCONTINUED | OUTPATIENT
Start: 2023-12-14 | End: 2023-12-15

## 2023-12-13 RX ORDER — FAMOTIDINE 20 MG/1
10 TABLET, FILM COATED ORAL DAILY
Status: DISCONTINUED | OUTPATIENT
Start: 2023-12-13 | End: 2023-12-20 | Stop reason: HOSPADM

## 2023-12-13 RX ORDER — ASPIRIN 81 MG/1
81 TABLET ORAL DAILY
Status: DISCONTINUED | OUTPATIENT
Start: 2023-12-13 | End: 2023-12-15 | Stop reason: SDUPTHER

## 2023-12-13 RX ORDER — AMOXICILLIN 250 MG
2 CAPSULE ORAL 2 TIMES DAILY
Status: DISCONTINUED | OUTPATIENT
Start: 2023-12-13 | End: 2023-12-20 | Stop reason: HOSPADM

## 2023-12-13 RX ORDER — IPRATROPIUM BROMIDE AND ALBUTEROL SULFATE 2.5; .5 MG/3ML; MG/3ML
3 SOLUTION RESPIRATORY (INHALATION) EVERY 4 HOURS PRN
Status: DISCONTINUED | OUTPATIENT
Start: 2023-12-13 | End: 2023-12-20 | Stop reason: HOSPADM

## 2023-12-13 RX ORDER — BISACODYL 10 MG
10 SUPPOSITORY, RECTAL RECTAL DAILY PRN
Status: DISCONTINUED | OUTPATIENT
Start: 2023-12-13 | End: 2023-12-20 | Stop reason: HOSPADM

## 2023-12-13 RX ORDER — IBUPROFEN 600 MG/1
1 TABLET ORAL
Status: DISCONTINUED | OUTPATIENT
Start: 2023-12-13 | End: 2023-12-13

## 2023-12-13 RX ORDER — SODIUM CHLORIDE 0.9 % (FLUSH) 0.9 %
10 SYRINGE (ML) INJECTION EVERY 12 HOURS SCHEDULED
Status: DISCONTINUED | OUTPATIENT
Start: 2023-12-13 | End: 2023-12-20 | Stop reason: HOSPADM

## 2023-12-13 RX ORDER — BISACODYL 5 MG/1
5 TABLET, DELAYED RELEASE ORAL DAILY PRN
Status: DISCONTINUED | OUTPATIENT
Start: 2023-12-13 | End: 2023-12-20 | Stop reason: HOSPADM

## 2023-12-13 RX ORDER — SODIUM CHLORIDE 9 MG/ML
40 INJECTION, SOLUTION INTRAVENOUS AS NEEDED
Status: DISCONTINUED | OUTPATIENT
Start: 2023-12-13 | End: 2023-12-20 | Stop reason: HOSPADM

## 2023-12-13 RX ORDER — NICOTINE POLACRILEX 4 MG
15 LOZENGE BUCCAL
Status: DISCONTINUED | OUTPATIENT
Start: 2023-12-13 | End: 2023-12-15

## 2023-12-13 RX ORDER — IBUPROFEN 600 MG/1
1 TABLET ORAL
Status: DISCONTINUED | OUTPATIENT
Start: 2023-12-13 | End: 2023-12-15

## 2023-12-13 RX ORDER — SODIUM CHLORIDE 0.9 % (FLUSH) 0.9 %
10 SYRINGE (ML) INJECTION AS NEEDED
Status: DISCONTINUED | OUTPATIENT
Start: 2023-12-13 | End: 2023-12-20 | Stop reason: HOSPADM

## 2023-12-13 RX ORDER — SODIUM CHLORIDE 9 MG/ML
40 INJECTION, SOLUTION INTRAVENOUS AS NEEDED
Status: DISCONTINUED | OUTPATIENT
Start: 2023-12-15 | End: 2023-12-15 | Stop reason: HOSPADM

## 2023-12-13 RX ORDER — NICARDIPINE HCL-0.9% SOD CHLOR 1 MG/10 ML
SYRINGE (ML) INTRAVENOUS
Status: DISCONTINUED | OUTPATIENT
Start: 2023-12-13 | End: 2023-12-13 | Stop reason: HOSPADM

## 2023-12-13 RX ORDER — SODIUM CHLORIDE 0.9 % (FLUSH) 0.9 %
10 SYRINGE (ML) INJECTION EVERY 12 HOURS SCHEDULED
Status: DISCONTINUED | OUTPATIENT
Start: 2023-12-13 | End: 2023-12-15

## 2023-12-13 RX ORDER — DEXTROSE MONOHYDRATE 25 G/50ML
25 INJECTION, SOLUTION INTRAVENOUS
Status: DISCONTINUED | OUTPATIENT
Start: 2023-12-13 | End: 2023-12-15

## 2023-12-13 RX ORDER — HEPARIN SODIUM 1000 [USP'U]/ML
INJECTION, SOLUTION INTRAVENOUS; SUBCUTANEOUS
Status: DISCONTINUED | OUTPATIENT
Start: 2023-12-13 | End: 2023-12-13 | Stop reason: HOSPADM

## 2023-12-13 RX ORDER — TAMSULOSIN HYDROCHLORIDE 0.4 MG/1
0.4 CAPSULE ORAL DAILY
Status: DISCONTINUED | OUTPATIENT
Start: 2023-12-13 | End: 2023-12-20 | Stop reason: HOSPADM

## 2023-12-13 RX ORDER — DEXTROSE MONOHYDRATE 25 G/50ML
25 INJECTION, SOLUTION INTRAVENOUS
Status: DISCONTINUED | OUTPATIENT
Start: 2023-12-13 | End: 2023-12-13 | Stop reason: SDUPTHER

## 2023-12-13 RX ORDER — MIDAZOLAM HYDROCHLORIDE 1 MG/ML
INJECTION INTRAMUSCULAR; INTRAVENOUS
Status: DISCONTINUED | OUTPATIENT
Start: 2023-12-13 | End: 2023-12-13 | Stop reason: HOSPADM

## 2023-12-13 RX ORDER — LEVOTHYROXINE SODIUM 137 UG/1
137 TABLET ORAL
Status: DISCONTINUED | OUTPATIENT
Start: 2023-12-13 | End: 2023-12-20 | Stop reason: HOSPADM

## 2023-12-13 RX ORDER — POLYETHYLENE GLYCOL 3350 17 G/17G
17 POWDER, FOR SOLUTION ORAL DAILY PRN
Status: DISCONTINUED | OUTPATIENT
Start: 2023-12-13 | End: 2023-12-20 | Stop reason: HOSPADM

## 2023-12-13 RX ORDER — LIDOCAINE HYDROCHLORIDE 10 MG/ML
INJECTION, SOLUTION EPIDURAL; INFILTRATION; INTRACAUDAL; PERINEURAL
Status: DISCONTINUED | OUTPATIENT
Start: 2023-12-13 | End: 2023-12-13 | Stop reason: HOSPADM

## 2023-12-13 RX ORDER — INSULIN LISPRO 100 [IU]/ML
2-9 INJECTION, SOLUTION INTRAVENOUS; SUBCUTANEOUS
Status: DISCONTINUED | OUTPATIENT
Start: 2023-12-13 | End: 2023-12-15

## 2023-12-13 RX ORDER — FENTANYL CITRATE 50 UG/ML
INJECTION, SOLUTION INTRAMUSCULAR; INTRAVENOUS
Status: DISCONTINUED | OUTPATIENT
Start: 2023-12-13 | End: 2023-12-13 | Stop reason: HOSPADM

## 2023-12-13 RX ORDER — PANTOPRAZOLE SODIUM 40 MG/10ML
40 INJECTION, POWDER, LYOPHILIZED, FOR SOLUTION INTRAVENOUS
Status: DISCONTINUED | OUTPATIENT
Start: 2023-12-13 | End: 2023-12-20

## 2023-12-13 RX ORDER — CHLORHEXIDINE GLUCONATE ORAL RINSE 1.2 MG/ML
15 SOLUTION DENTAL EVERY 12 HOURS
Qty: 30 ML | Refills: 0 | Status: COMPLETED | OUTPATIENT
Start: 2023-12-14 | End: 2023-12-15

## 2023-12-13 RX ORDER — NICOTINE POLACRILEX 4 MG
15 LOZENGE BUCCAL
Status: DISCONTINUED | OUTPATIENT
Start: 2023-12-13 | End: 2023-12-13 | Stop reason: SDUPTHER

## 2023-12-13 RX ORDER — MONTELUKAST SODIUM 10 MG/1
10 TABLET ORAL NIGHTLY
Status: DISCONTINUED | OUTPATIENT
Start: 2023-12-13 | End: 2023-12-20 | Stop reason: HOSPADM

## 2023-12-13 RX ORDER — SODIUM CHLORIDE 0.9 % (FLUSH) 0.9 %
10 SYRINGE (ML) INJECTION AS NEEDED
Status: DISCONTINUED | OUTPATIENT
Start: 2023-12-13 | End: 2023-12-15

## 2023-12-13 RX ORDER — SODIUM CHLORIDE 9 MG/ML
100 INJECTION, SOLUTION INTRAVENOUS CONTINUOUS
Status: DISCONTINUED | OUTPATIENT
Start: 2023-12-14 | End: 2023-12-15

## 2023-12-13 RX ADMIN — INSULIN LISPRO 4 UNITS: 100 INJECTION, SOLUTION INTRAVENOUS; SUBCUTANEOUS at 21:03

## 2023-12-13 RX ADMIN — FAMOTIDINE 10 MG: 20 TABLET, FILM COATED ORAL at 09:29

## 2023-12-13 RX ADMIN — SENNOSIDES AND DOCUSATE SODIUM 2 TABLET: 8.6; 5 TABLET ORAL at 21:03

## 2023-12-13 RX ADMIN — MONTELUKAST 10 MG: 10 TABLET, FILM COATED ORAL at 21:03

## 2023-12-13 RX ADMIN — PANTOPRAZOLE SODIUM 40 MG: 40 INJECTION, POWDER, LYOPHILIZED, FOR SOLUTION INTRAVENOUS at 09:29

## 2023-12-13 RX ADMIN — TAMSULOSIN HYDROCHLORIDE 0.4 MG: 0.4 CAPSULE ORAL at 09:28

## 2023-12-13 RX ADMIN — LEVOTHYROXINE SODIUM 137 MCG: 0.14 TABLET ORAL at 05:24

## 2023-12-13 RX ADMIN — ASPIRIN 81 MG: 81 TABLET, COATED ORAL at 09:29

## 2023-12-13 RX ADMIN — INSULIN DETEMIR 5 UNITS: 100 INJECTION, SOLUTION SUBCUTANEOUS at 21:04

## 2023-12-13 RX ADMIN — Medication 10 ML: at 21:04

## 2023-12-13 RX ADMIN — SULFUR HEXAFLUORIDE 5 ML: KIT at 16:44

## 2023-12-13 NOTE — PROGRESS NOTES
HEPARIN INFUSION  Joni Hoffman is a  78 y.o. male receiving heparin infusion.     Therapy for (VTE/Cardiac):   Cardiac  Patient Weight: 93 KG  Initial Bolus (Y/N):   Y  Any Bolus (Y/N):   Y        Signs or Symptoms of Bleeding: N    Cardiac or Other (Not VTE)   Initial Bolus: 60 units/kg (Max 4,000 units)  Initial rate: 12 units/kg/hr (Max 1,000 units/hr)   Anti Xa Rebolus Infusion Hold time Change infusion Dose (Units/kg/hr) Next Anti Xa or aPTT Level Due   < 0.11 50 Units/kg  (4000 Units Max) None Increase by  3 Units/kg/hr 6 hours   0.11- 0.19 25 Units/kg  (2000 Units Max) None Increase by  2 Units/kg/hr 6 hours   0.2 - 0.29 0 None Increase by  1 Units/kg/hr 6 hours   0.3 - 0.5 0 None No Change 6 hours (after 2 consecutive levels in range check qAM)   0.51 - 0.6 0 None Decrease by  1 Units/kg/hr 6 hours   0.61 - 0.8 0 30 Minutes Decrease by  2 Units/kg/hr 6 hours   0.81 - 1 0 60 Minutes Decrease by  3 Units/kg/hr 6 hours   >1 0 Hold  After Anti Xa less than 0.5 decrease previous rate by  4 Units/kg/hr  Every 2 hours until Anti Xa  less than 0.5 then when infusion restarts in 6 hours     Recommend Xa every 6 hours.     Results from last 7 days   Lab Units 12/12/23  1920   INR  0.93   HEMOGLOBIN g/dL 15.4   HEMATOCRIT % 47.3   PLATELETS 10*3/mm3 200          Date   Time   Anti-Xa Current Rate (Unit/kg/hr) Bolus   (Units) Rate Change   (Unit/kg/hr) New Rate (Unit/kg/hr) Next   Anti-Xa Comments  Pump Check Daily   12/12 19:20 0.10 New start 4000 +10.8 10.8 04:00 IV pump set up with RN. Patient and wife counseled on  Heparin.                                                                                                                                                                                                                                 King Tolentino, PharmD, BCPS  21:16 EST   12/12/23

## 2023-12-13 NOTE — CONSULTS
CTS consult     Patient Care Team:  Little Barker MD as PCP - General (Internal Medicine)  Little Barker MD as Referring Physician (Internal Medicine)  Bry Dill MD as Consulting Physician (Cardiology)  Referring MD: Coronary disease    Chief Complaint: Magan Mcmanus MD ,   HPI  Patient is a 78 y.o. male with hypertension, hyperlipidemia, type 2 diabetes treated with insulin, remote history of smoking tobacco and known coronary disease.  He has had a stent placed in 2000.  Recently developed a new onset of chest pain while going upstairs.  Symptoms returned on occasion over the next couple days.  He sought care at his primary care physician who did an EKG and some blood work.  EKG and blood work were reportedly abnormal and he was told to report to an outside hospital.  He presented and was observed and then discharged.  He self presented at the [facility] emergency department on 12/12/2023 where he was again admitted by the hospitalist service for observation.  Cardiology consultation obtained.  After evaluating the patient was felt the patient would need further investigation with cardiac catheterization.  That was performed on 12/23/2023 and was found to have significant three-vessel coronary disease.  CT surgery's been consulted for CABG evaluation.    Review of Systems  Pertinent items are noted in HPI.    History  Past Medical History:   Diagnosis Date    Acid indigestion     related to meds    Arthritis     Coronary artery disease 2000    s/p 2 stents after MI     Diabetes mellitus 2007    po meds; checks blood sugars at home run     Disease of thyroid gland     hypothyroidism     Hearing loss     uses hearing aids     History of kidney stones     passed    Hypertension     Lumbar back pain     MI, old 2000    2 stents inserted    Wears glasses     Wears hearing aid      Past Surgical History:   Procedure Laterality Date    APPENDECTOMY      CATARACT  EXTRACTION WITH INTRAOCULAR LENS IMPLANT Bilateral     COLONOSCOPY      CORONARY STENT PLACEMENT  2000    x2    INTERVENTIONAL RADIOLOGY PROCEDURE N/A 2019    Procedure: myelogram lumbar spine;  Surgeon: Karan Pennington MD;  Location:  [facility];  Service: Interventional Radiology    LUMBAR DISCECTOMY Left 10/27/2017    Procedure: LUMBAR DISCECTOMY L5-S1 LEFT ;  Surgeon: Elbert De Anda MD;  Location:  [facility] OR;  Service:     LUMBAR DISCECTOMY Left 2020    Procedure: RE-DO LUMBAR DISCECTOMY L5-S1 LEFT;  Surgeon: Elbert De Anda MD;  Location:  [facility];  Service: Neurosurgery;  Laterality: Left;    NASAL POLYP SURGERY       Family History   Problem Relation Age of Onset    Heart disease Mother     Heart disease Father     Diabetes Sister     Heart disease Brother      Social History     Tobacco Use    Smoking status: Former     Packs/day: 1.00     Years: 25.00     Additional pack years: 0.00     Total pack years: 25.00     Types: Cigarettes     Quit date:      Years since quittin.9    Smokeless tobacco: Current     Types: Chew    Tobacco comments:     hx of cigarette use for 25 years and chewed about 25 years (currently chews on occasion)    Vaping Use    Vaping Use: Never used   Substance Use Topics    Alcohol use: No    Drug use: No     Medications Prior to Admission   Medication Sig Dispense Refill Last Dose    aspirin 81 MG EC tablet Take 1 tablet by mouth Daily.   2023 at 1800    atenolol (TENORMIN) 25 MG tablet Take 1 tablet by mouth Daily With Breakfast.  3 2023 at 0900    Droplet Pen Needles 32G X 4 MM misc    2023    famotidine (PEPCID) 10 MG tablet Take 1 tablet by mouth Every Morning.   2023    fluticasone (FLONASE) 50 MCG/ACT nasal spray    2023    glimepiride (AMARYL) 4 MG tablet Take 1 tablet by mouth 2 (Two) Times a Day. Takes 4 mg in the am and 2 mg in the PM  1 2023    ibuprofen (ADVIL,MOTRIN) 600 MG tablet TAKE 1 TABLET BY  MOUTH THREE TIMES DAILY AS NEEDED FOR BACK PAIN *TAKE WITH ACETAMINOPHEN   12/12/2023    levothyroxine (SYNTHROID, LEVOTHROID) 137 MCG tablet TAKE 1 TABLET BY MOUTH EVERY MORNING ON EMPTY STOMACH   12/12/2023    mometasone (NASONEX) 50 MCG/ACT nasal spray 2 sprays into the nostril(s) as directed by provider Every Morning.   12/12/2023    montelukast (SINGULAIR) 10 MG tablet Take 1 tablet by mouth Every Night.   12/12/2023    NovoLIN 70/30 FlexPen (70-30) 100 UNIT/ML suspension pen-injector    12/11/2023    Repatha SureClick solution auto-injector SureClick injection ADMINISTER 1 INJECTION UNDER THE SKIN EVERY 2 WEEKS   12/5/2023 at 0900    tamsulosin (FLOMAX) 0.4 MG capsule 24 hr capsule    12/11/2023 at 2100    triamterene-hydrochlorothiazide (DYAZIDE) 37.5-25 MG per capsule    12/12/2023    True Metrix Blood Glucose Test test strip    12/12/2023    TRUEplus Lancets 30G misc    12/12/2023    chlorhexidine (PERIDEX) 0.12 % solution RINSE 1/2 OUNCE (15 ML) IN MOUTH FOR 30 SECONDS THEN SPIT TWICE DAILY   Unknown    cyclobenzaprine (FLEXERIL) 5 MG tablet    Unknown    Nutritional Supplements (JUICE PLUS FIBRE PO) Take 1 dose by mouth Every Morning.       trandolapril (MAVIK) 4 MG tablet Take 1 tablet by mouth Every Morning.  3      Allergies:  Hydrocodone-acetaminophen and Penicillins    Objective    Vital Signs  Temp:  [97.3 °F (36.3 °C)-98.8 °F (37.1 °C)] 98.5 °F (36.9 °C)  Heart Rate:  [63-87] 83  Resp:  [18-20] 18  BP: (120-184)/(63-99) 126/66    Physical Exam:  General Appearance: Well-developed, well-nourished, no acute distress  Head: Atraumatic normocephalic  Neck: Supple without bruit or mass  Lungs: Clear to auscultation bilaterally no wheezes rales or rubs.  Unlabored on room air  Heart: Regular rate and rhythm, distant but no murmur rub or gallop noted.  Sinus rhythm  Telemetry  Abdomen: Soft, nontender, positive bowel sounds, no masses bruits noted  Extremities: Warm to the touch no significant peripheral  edema or cyanosis  Pulses: Palpable peripheral pulses throughout the upper and lower extremities.  TR band on the right radial artery.  Palpable pedal pulses although hard to palpate on the right foot  Skin: Warm, dry no rashes or lesions  Neurologic: Grossly intact.  No focal deficit.  Alert and oriented x 3          Data Review:  Results from last 7 days   Lab Units 12/13/23  0331   WBC 10*3/mm3 7.23   HEMOGLOBIN g/dL 13.9   HEMATOCRIT % 41.7   PLATELETS 10*3/mm3 176     Results from last 7 days   Lab Units 12/13/23  0331   SODIUM mmol/L 141   POTASSIUM mmol/L 3.8   CHLORIDE mmol/L 105   CO2 mmol/L 26.0   BUN mg/dL 21   CREATININE mg/dL 1.07   GLUCOSE mg/dL 105*   CALCIUM mg/dL 8.9     Coagulation:   INR   Date Value Ref Range Status   12/12/2023 0.93 0.89 - 1.12 Final               Imaging Results (Last 72 Hours)       Procedure Component Value Units Date/Time    XR Chest 1 View [[id number]] Collected: 12/12/23 1951     Updated: 12/12/23 1955    Narrative:      XR CHEST 1 VW    Date of Exam: 12/12/2023 7:13 PM EST    Indication: Chest Pain Triage Protocol    Comparison: None available.    Findings:  Lines: None    Lungs: Nonspecific minimal patchy opacities in the peripheral aspect of the lung bases bilaterally. Otherwise the lungs are clear  Pleura: No pleural effusion or pneumothorax.    Cardiomediastinum: The cardiomediastinal silhouette is normal    Soft Tissues: Unremarkable.    Bones: No acute osseous abnormality.      Impression:      Impression:  Minimal patchy opacities in the peripheral lung bases bilaterally most likely represents atelectasis, although pulmonary infiltrates not completely excluded.      Electronically Signed: Enrike Walters DO    12/12/2023 7:52 PM EST    Workstation ID: [id number]          Cardiac catheterization from 12/13/2023  LM:   Left main does not any significant disease divides into LAD and circumflex  LAD:  LAD is heavily calcified with severe in-stent restenosis with NATTY  II flow. First diagonal appears to be draining in AV fistula.  LCX:   Circumflex first OM has about 80% disease  RCA:   Mid RCA has 70 to 80% disease     Assessment:    Severe three-vessel coronary disease in the setting of unstable angina and NSTEMI.  Patient has a history of known coronary disease with a history of stenting in 2000.    NSTEMI (non-ST elevated myocardial infarction)    HTN (hypertension)    CAD (coronary artery disease)    Hypothyroidism (acquired)    T2DM (type 2 diabetes mellitus)    Nocturnal hypoxia    HLD (hyperlipidemia)    NSTEMI, initial episode of care        Plan:  Ongoing evaluation for possible coronary bypass grafting with severe three-vessel coronary disease.  Will obtain an echocardiogram to check the ejection fraction and rule out any valvular abnormalities.  Reportedly patient received some Plavix, therefore we will check P2Y12.  Will also get a carotid duplex and PFTs.  If the patient agrees to proceed with surgery after speaking with Dr. Darnell we will tentatively schedule his bypass for Friday.      IRAIS Duran  12/13/23  14:06 EST

## 2023-12-13 NOTE — PLAN OF CARE
Goal Outcome Evaluation:  Plan of Care Reviewed With: patient        Progress: no change  Outcome Evaluation: Patient admitted to room 460, no c/o pain upon arrival, VSS, family at BS, care ongoing TGS RN

## 2023-12-13 NOTE — ED PROVIDER NOTES
EMERGENCY DEPARTMENT ENCOUNTER    Pt Name: Joni Hoffman  MRN: [id number]  Pt :   1945  Room Number:  S460/1  Date of encounter:  2023  PCP: Little Barker MD  ED Provider: IRAIS Peoples    Historian: Patient    HPI:  Chief Complaint: Chest pain    Context: Joni Hoffman is a 78 y.o. male who presents to the ED c/o chest pain.  Patient reports that on Saturday he was walking up some steps and once he got to the top of the steps he started to experience pain in his chest that was radiating into his neck.  He reports that this concerned him as he has history of coronary artery disease.  Patient shares that his family encouraged him to see his primary care physician yesterday.  He had a workup done outpatient that included labs and EKG.  He noted that the EKG was normal but his primary care provider called him back later stating that he had elevated cardiac enzymes.  He was instructed to go to the hospital at this point in time.  Patient presented to the [place], [place] emergency department in [facility].  He was admitted over night last night and monitored with serial cardiac enzymes.  He was told that he was going to be transferred to this facility but this never occurred and there was no evidence of communication from the hospital to this facility until after 1:00 PM today with his cardiologist Dr. Dill leaving at noon.  Patient states that he did not know what to do and thus he presented to the ER this evening.  Patient has continued to have chest pain that is worsened by exertion.  He reports accompanying shortness of breath with exertion.  Patient takes 81 mg aspirin daily.  He reports no additional complaints on interview and exam.  HPI     REVIEW OF SYSTEMS  A chief complaint appropriate review of systems was completed and is negative except as noted in the HPI.     PAST MEDICAL HISTORY  Past Medical History:   Diagnosis Date    Acid indigestion     related to meds     Arthritis     Coronary artery disease 2000    s/p 2 stents after MI     Diabetes mellitus     po meds; checks blood sugars at home run     Disease of thyroid gland     hypothyroidism     Hearing loss     uses hearing aids     History of kidney stones     passed    Hypertension     Lumbar back pain     MI, old     2 stents inserted    Wears glasses     Wears hearing aid        PAST SURGICAL HISTORY  Past Surgical History:   Procedure Laterality Date    APPENDECTOMY      CATARACT EXTRACTION WITH INTRAOCULAR LENS IMPLANT Bilateral     COLONOSCOPY      CORONARY STENT PLACEMENT  2000    x2    INTERVENTIONAL RADIOLOGY PROCEDURE N/A 2019    Procedure: myelogram lumbar spine;  Surgeon: Karan Pennington MD;  Location:  [facility];  Service: Interventional Radiology    LUMBAR DISCECTOMY Left 10/27/2017    Procedure: LUMBAR DISCECTOMY L5-S1 LEFT ;  Surgeon: Elbert De Anda MD;  Location:  [facility];  Service:     LUMBAR DISCECTOMY Left 2020    Procedure: RE-DO LUMBAR DISCECTOMY L5-S1 LEFT;  Surgeon: Elbert De Anda MD;  Location:  [facility];  Service: Neurosurgery;  Laterality: Left;    NASAL POLYP SURGERY         FAMILY HISTORY  Family History   Problem Relation Age of Onset    Heart disease Mother     Heart disease Father     Diabetes Sister     Heart disease Brother        SOCIAL HISTORY  Social History     Socioeconomic History    Marital status:    Tobacco Use    Smoking status: Former     Packs/day: 1.00     Years: 25.00     Additional pack years: 0.00     Total pack years: 25.00     Types: Cigarettes     Quit date:      Years since quittin.9    Smokeless tobacco: Current     Types: Chew    Tobacco comments:     hx of cigarette use for 25 years and chewed about 25 years (currently chews on occasion)    Vaping Use    Vaping Use: Never used   Substance and Sexual Activity    Alcohol use: No    Drug use: No    Sexual activity: Defer        ALLERGIES  Hydrocodone-acetaminophen and Penicillins    PHYSICAL EXAM  Physical Exam  GENERAL:   Appears in no acute distress.   HENT: Nares patent.  EYES: No scleral icterus.  CV: Regular rhythm, regular rate.  RESPIRATORY: Normal effort.  No audible wheezes, rales or rhonchi.  ABDOMEN: Soft, nontender  MUSCULOSKELETAL: No deformities.   NEURO: Alert, moves all extremities, follows commands.  SKIN: Warm, dry, no rash visualized.  I have reviewed the triage vital signs and nursing notes.     LAB RESULTS  Results for orders placed or performed during the hospital encounter of 12/12/23   High Sensitivity Troponin T    Specimen: Blood   Result Value Ref Range    HS Troponin T 124 (C) <22 ng/L   Comprehensive Metabolic Panel    Specimen: Blood   Result Value Ref Range    Glucose 188 (H) 65 - 99 mg/dL    BUN 24 (H) 8 - 23 mg/dL    Creatinine 1.25 0.76 - 1.27 mg/dL    Sodium 142 136 - 145 mmol/L    Potassium 4.4 3.5 - 5.2 mmol/L    Chloride 102 98 - 107 mmol/L    CO2 29.0 22.0 - 29.0 mmol/L    Calcium 9.5 8.6 - 10.5 mg/dL    Total Protein 7.0 6.0 - 8.5 g/dL    Albumin 4.0 3.5 - 5.2 g/dL    ALT (SGPT) 18 1 - 41 U/L    AST (SGOT) 20 1 - 40 U/L    Alkaline Phosphatase 72 39 - 117 U/L    Total Bilirubin 0.2 0.0 - 1.2 mg/dL    Globulin 3.0 gm/dL    A/G Ratio 1.3 g/dL    BUN/Creatinine Ratio 19.2 7.0 - 25.0    Anion Gap 11.0 5.0 - 15.0 mmol/L    eGFR 58.9 (L) >60.0 mL/min/1.73   Lipase    Specimen: Blood   Result Value Ref Range    Lipase 41 13 - 60 U/L   BNP    Specimen: Blood   Result Value Ref Range    proBNP 733.6 0.0 - 1,800.0 pg/mL   CBC Auto Differential    Specimen: Blood   Result Value Ref Range    WBC 9.01 3.40 - 10.80 10*3/mm3    RBC 5.06 4.14 - 5.80 10*6/mm3    Hemoglobin 15.4 13.0 - 17.7 g/dL    Hematocrit 47.3 37.5 - 51.0 %    MCV 93.5 79.0 - 97.0 fL    MCH 30.4 26.6 - 33.0 pg    MCHC 32.6 31.5 - 35.7 g/dL    RDW 12.9 12.3 - 15.4 %    RDW-SD 44.1 37.0 - 54.0 fl    MPV 9.7 6.0 - 12.0 fL    Platelets 200 140 -  450 10*3/mm3    Neutrophil % 58.5 42.7 - 76.0 %    Lymphocyte % 21.1 19.6 - 45.3 %    Monocyte % 12.1 (H) 5.0 - 12.0 %    Eosinophil % 7.4 (H) 0.3 - 6.2 %    Basophil % 0.7 0.0 - 1.5 %    Immature Grans % 0.2 0.0 - 0.5 %    Neutrophils, Absolute 5.27 1.70 - 7.00 10*3/mm3    Lymphocytes, Absolute 1.90 0.70 - 3.10 10*3/mm3    Monocytes, Absolute 1.09 (H) 0.10 - 0.90 10*3/mm3    Eosinophils, Absolute 0.67 (H) 0.00 - 0.40 10*3/mm3    Basophils, Absolute 0.06 0.00 - 0.20 10*3/mm3    Immature Grans, Absolute 0.02 0.00 - 0.05 10*3/mm3    nRBC 0.0 0.0 - 0.2 /100 WBC   High Sensitivity Troponin T 2Hr    Specimen: Blood   Result Value Ref Range    HS Troponin T 140 (C) <22 ng/L    Troponin T Delta 16 (C) >=-4 - <+4 ng/L   D-dimer, Quantitative    Specimen: Blood   Result Value Ref Range    D-Dimer, Quantitative 0.32 0.00 - 0.78 MCGFEU/mL   Heparin Anti-Xa    Specimen: Blood   Result Value Ref Range    Heparin Anti-Xa (UFH) 0.10 (L) 0.30 - 0.70 IU/ml   Protime-INR    Specimen: Blood   Result Value Ref Range    Protime 12.5 12.2 - 14.5 Seconds    INR 0.93 0.89 - 1.12   aPTT    Specimen: Blood   Result Value Ref Range    PTT 33.5 (L) 60.0 - 90.0 seconds   CBC Auto Differential    Specimen: Blood   Result Value Ref Range    WBC 7.23 3.40 - 10.80 10*3/mm3    RBC 4.59 4.14 - 5.80 10*6/mm3    Hemoglobin 13.9 13.0 - 17.7 g/dL    Hematocrit 41.7 37.5 - 51.0 %    MCV 90.8 79.0 - 97.0 fL    MCH 30.3 26.6 - 33.0 pg    MCHC 33.3 31.5 - 35.7 g/dL    RDW 12.7 12.3 - 15.4 %    RDW-SD 42.2 37.0 - 54.0 fl    MPV 9.6 6.0 - 12.0 fL    Platelets 176 140 - 450 10*3/mm3    Neutrophil % 56.5 42.7 - 76.0 %    Lymphocyte % 21.4 19.6 - 45.3 %    Monocyte % 12.4 (H) 5.0 - 12.0 %    Eosinophil % 8.7 (H) 0.3 - 6.2 %    Basophil % 0.7 0.0 - 1.5 %    Immature Grans % 0.3 0.0 - 0.5 %    Neutrophils, Absolute 4.08 1.70 - 7.00 10*3/mm3    Lymphocytes, Absolute 1.55 0.70 - 3.10 10*3/mm3    Monocytes, Absolute 0.90 0.10 - 0.90 10*3/mm3    Eosinophils, Absolute  0.63 (H) 0.00 - 0.40 10*3/mm3    Basophils, Absolute 0.05 0.00 - 0.20 10*3/mm3    Immature Grans, Absolute 0.02 0.00 - 0.05 10*3/mm3    nRBC 0.0 0.0 - 0.2 /100 WBC   Procalcitonin    Specimen: Blood   Result Value Ref Range    Procalcitonin 0.07 0.00 - 0.25 ng/mL   Lactic Acid, Plasma    Specimen: Blood   Result Value Ref Range    Lactate 0.8 0.5 - 2.0 mmol/L   High Sensitivity Troponin T    Specimen: Blood   Result Value Ref Range    HS Troponin T 154 (C) <22 ng/L   POC Glucose Once    Specimen: Blood   Result Value Ref Range    Glucose 105 70 - 130 mg/dL   ECG 12 Lead ED Triage Standing Order; Chest Pain   Result Value Ref Range    QT Interval 364 ms    QTC Interval 404 ms   ECG 12 Lead ED Triage Standing Order; Chest Pain   Result Value Ref Range    QT Interval 406 ms    QTC Interval 422 ms   Green Top (Gel)   Result Value Ref Range    Extra Tube Hold for add-ons.    Lavender Top   Result Value Ref Range    Extra Tube hold for add-on    Gold Top - SST   Result Value Ref Range    Extra Tube Hold for add-ons.    Gray Top   Result Value Ref Range    Extra Tube Hold for add-ons.    Light Blue Top   Result Value Ref Range    Extra Tube Hold for add-ons.        If labs were ordered, I independently reviewed the results and considered them in treating the patient.    RADIOLOGY  XR Chest 1 View   Final Result   Impression:   Minimal patchy opacities in the peripheral lung bases bilaterally most likely represents atelectasis, although pulmonary infiltrates not completely excluded.         Electronically Signed: Enrike Walters DO     12/12/2023 7:52 PM EST     Workstation ID: [id number]        [x] Radiologist's Report Reviewed:  I ordered and independently interpreted the above noted radiographic studies.  See radiologist's dictation for official interpretation.      PROCEDURES    Critical Care    Performed by: Abdon Starkey PA  Authorized by: Amadou Alvarez MD    Critical care provider statement:     Critical  care time (minutes):  35    Critical care time was exclusive of:  Separately billable procedures and treating other patients    Critical care was necessary to treat or prevent imminent or life-threatening deterioration of the following conditions:  Cardiac failure    Critical care was time spent personally by me on the following activities:  Development of treatment plan with patient or surrogate, discussions with consultants, examination of patient, evaluation of patient's response to treatment, obtaining history from patient or surrogate, ordering and performing treatments and interventions, ordering and review of laboratory studies, ordering and review of radiographic studies, re-evaluation of patient's condition and pulse oximetry    Care discussed with: admitting provider        ECG 12 Lead ED Triage Standing Order; Chest Pain   Preliminary Result   Test Reason : ED Triage Standing Order~   Blood Pressure :   */*   mmHG   Vent. Rate :  65 BPM     Atrial Rate :  65 BPM      P-R Int : 202 ms          QRS Dur :  94 ms       QT Int : 406 ms       P-R-T Axes :  48  36  74 degrees      QTc Int : 422 ms      Normal sinus rhythm   Normal ECG   When compared with ECG of 12-DEC-2023 19:11, (Unconfirmed)   No significant change was found      Referred By:            Confirmed By:       ECG 12 Lead ED Triage Standing Order; Chest Pain   Preliminary Result   Test Reason : ED Triage Standing Order~   Blood Pressure :   */*   mmHG   Vent. Rate :  74 BPM     Atrial Rate :  74 BPM      P-R Int : 200 ms          QRS Dur :  90 ms       QT Int : 364 ms       P-R-T Axes :  28  17  65 degrees      QTc Int : 404 ms      Normal sinus rhythm   Nonspecific T wave abnormality   Abnormal ECG   When compared with ECG of 12-FEB-2020 10:46,   Non-specific change in ST segment in Anterior leads   Nonspecific T wave abnormality now evident in Anterior leads      Referred By:            Confirmed By:       ECG 12 Lead Chest Pain    (Results  Pending)       MEDICATIONS GIVEN IN ER    Medications   sodium chloride 0.9 % flush 10 mL (has no administration in time range)   heparin 49498 units/250 mL (100 units/mL) in 0.45 % NaCl infusion (10.8 Units/kg/hr × 93 kg Intravenous Rate/Dose Verify 12/13/23 0315)   Pharmacy to Dose Heparin (has no administration in time range)   aspirin EC tablet 81 mg (has no administration in time range)   famotidine (PEPCID) tablet 10 mg (has no administration in time range)   levothyroxine (SYNTHROID, LEVOTHROID) tablet 137 mcg (has no administration in time range)   montelukast (SINGULAIR) tablet 10 mg (has no administration in time range)   tamsulosin (FLOMAX) 24 hr capsule 0.4 mg (has no administration in time range)   sodium chloride 0.9 % flush 10 mL (10 mL Intravenous Not Given 12/13/23 0043)   sodium chloride 0.9 % flush 10 mL (has no administration in time range)   sodium chloride 0.9 % infusion 40 mL (has no administration in time range)   sennosides-docusate (PERICOLACE) 8.6-50 MG per tablet 2 tablet (2 tablets Oral Not Given 12/13/23 0042)     And   polyethylene glycol (MIRALAX) packet 17 g (has no administration in time range)     And   bisacodyl (DULCOLAX) EC tablet 5 mg (has no administration in time range)     And   bisacodyl (DULCOLAX) suppository 10 mg (has no administration in time range)   dextrose (GLUTOSE) oral gel 15 g (has no administration in time range)   dextrose (D50W) (25 g/50 mL) IV injection 25 g (has no administration in time range)   glucagon (GLUCAGEN) injection 1 mg (has no administration in time range)   insulin regular (humuLIN R,novoLIN R) injection 2-7 Units ( Subcutaneous Not Given 12/13/23 0041)   aspirin chewable tablet 324 mg (324 mg Oral Given 12/12/23 2132)   nitroglycerin (NITROSTAT) ointment 1 inch (1 inch Topical Given 12/12/23 2128)   clopidogrel (PLAVIX) tablet 300 mg (300 mg Oral Given 12/12/23 2128)   heparin (porcine) injection 4,000 Units (4,000 Units Intravenous Given  12/12/23 2123)       MEDICAL DECISION MAKING, PROGRESS, and CONSULTS   Medical Decision Making  Problems Addressed:  History of coronary artery disease: chronic illness or injury  History of diabetes mellitus: chronic illness or injury  History of hyperlipidemia: chronic illness or injury  History of hypertension: chronic illness or injury  History of hypothyroidism: chronic illness or injury  NSTEMI (non-ST elevated myocardial infarction): complicated acute illness or injury    Amount and/or Complexity of Data Reviewed  Labs: ordered.  Radiology: ordered.  ECG/medicine tests: ordered.    Risk  OTC drugs.  Prescription drug management.  Decision regarding hospitalization.        All labs have been independently reviewed by me.  All radiology studies have been interpreted by me and the radiologist dictating the report.  All EKG's have been independently interpreted by me as well as and overseeing attending physician.    [] Discussed with radiology regarding test interpretation:    Discussion below represents my analysis of pertinent findings related to patient's condition, differential diagnosis, treatment plan and final disposition.    Differential diagnosis:  The differential diagnosis associated with the patient's presentation includes: Congestive heart failure (volume overload), acute coronary syndrome (STEMI/NSTEMI), pneumothorax, pneumonia, URI, myocarditis and GERD.     Additional sources  Discussed/ obtained information from independent historians:   [] Spouse  [] Parent  [] Family member  [] Friend  [] EMS   [] Other:  External (non-ED) record review:   [] Inpatient record:   [] Office record:   [] Outpatient record:   [] Prior Outpatient labs:   [] Prior Outpatient radiology:   [] Primary Care record:   [] Outside ED record:   [] Other:   Patient's care impacted by:   [x] Diabetes  [] Hypertension  [x] Hyperlipidemia  [x] Hypothyroidism   [x] Coronary Artery Disease   [] COPD   [] Cancer   [] Obesity  []  GERD   [] Tobacco Abuse   [] Substance Abuse    [] Anxiety   [] Depression   [] Other:   Care significantly affected by Social Determinants of Health (housing and economic circumstances, unemployment)    [] Yes     [x] No   If yes, Patient's care significantly limited by  Social Determinants of Health including:   [] Inadequate housing   [] Low income   [] Alcoholism and drug addiction in family   [] Problems related to primary support group   [] Unemployment   [] Problems related to employment   [] Other Social Determinants of Health:     Shared decision making:  I reviewed workup performed in ED including labs and imaging. Based on findings, recommendation made for admission. Patient is agreeable to plan of care and hospital admission.      Orders placed during this visit:  Orders Placed This Encounter   Procedures    Critical Care    XR Chest 1 View    [place] Draw    High Sensitivity Troponin T    Comprehensive Metabolic Panel    Lipase    BNP    CBC Auto Differential    High Sensitivity Troponin T 2Hr    D-dimer, Quantitative    Heparin Anti-Xa    Protime-INR    aPTT    Heparin Anti-Xa    CBC Auto Differential    Procalcitonin    Lactic Acid, Plasma    High Sensitivity Troponin T    Basic Metabolic Panel    Magnesium    TSH    Hemoglobin A1c    Lipid Panel    NPO Diet NPO Type: Sips with Meds    Undress & Gown    Notify Provider Platelet Count Less Than 32520    Stop Infusion & Notify Provider if Bleeding Occurs    Please document room air saturation while sleeping. Thanks.  Nursing Communication    Vital Signs    Weigh Patient    Oral Care    Pulse Oximetry, Continuous    Telemetry - Maintain IV Access    Telemetry - Place Orders & Notify Provider of Results When Patient Experiences Acute Chest Pain, Dysrhythmia or Respiratory Distress    Up With Assistance    Daily Weights    Strict Intake & Output    Code Status and Medical Interventions:    Inpatient Cardiology Consult    Oxygen Therapy- Nasal Cannula;  Titrate 1-6 LPM Per SpO2; 90 - 95%    Incentive Spirometry    Oxygen Therapy- Nasal Cannula; Titrate 1-6 LPM Per SpO2; 90 - 95%    POC Glucose Daily    POC Glucose Once    ECG 12 Lead ED Triage Standing Order; Chest Pain    ECG 12 Lead ED Triage Standing Order; Chest Pain    ECG 12 Lead Chest Pain    Insert Peripheral IV    Insert Peripheral IV    Initiate Observation Status    ED Bed Request    CBC & Differential    Green Top (Gel)    Lavender Top    Gold Top - SST    Gray Top    Light Blue Top    CBC & Differential     ED Course:    ED Course as of 12/13/23 0437   Tue Dec 12, 2023   2018 EKG personally interpreted by myself and Dr. King with concern based on patient's elevated cardiac enzyme.  No evidence of acute ischemic changes on EKG. [JG]   2049 ER workup reviewed with attending Dr. Alvarez who is in agreement of admission to hospitalist service for chest pain, NSTEMI.  Recommendation made for 1 inch of Nitropaste, heparin drip, aspirin and Plavix. [JG]   2053 HEART Score for Major Cardiac Events -   8 points -> High Score (7-10 points) Risk of MACE of 50-65%. [JG]   Wed Dec 13, 2023   0435 In summary this is a pleasant 78-year-old male who presents to the ED for evaluation of chest pain after being admitted to his [place] hospital last night and found to have an elevated troponin.  Troponin elevated in ED today with repeat troponin with positive delta of 16.  Patient continued to have intermittent chest pain throughout the day today that was particularly worse with exertion with some accompanied shortness of breath.  Remainder of labs without acute or emergent abnormalities.  D-dimer negative.  Chest x-ray without acute cardiopulmonary process.  EKG without evidence of acute ischemic changes.  Heart score 8.  Patient admitted to hospital medicine for further evaluation and treatment of his NSTEMI and evaluation by cardiology.  Treated with aspirin, Plavix, nitroglycerin and initiated on a heparin drip.  [JG]      ED Course User Index  [JG] Abdon Starkey PA            DIAGNOSIS  Final diagnoses:   NSTEMI (non-ST elevated myocardial infarction)   History of diabetes mellitus   History of hypertension   History of hyperlipidemia   History of hypothyroidism   History of coronary artery disease       DISPOSITION    ED Disposition       ED Disposition   Decision to Admit    Condition   --    Comment   Level of Care: Telemetry [5]   Diagnosis: NSTEMI (non-ST elevated myocardial infarction) [247175]   Admitting Physician: JENNIFER CHRISTENSEN [[id number]]   Attending Physician: JENNIFER CHRISTENSEN [[id number]]   Bed Request Comments: tele                 Please note that portions of this document were completed with voice recognition software.        bAdon Starkey PA  12/13/23 0438

## 2023-12-13 NOTE — H&P
[facility] Medicine Services  HISTORY AND PHYSICAL    Patient Name: Joni Hoffman  : 1945  MRN: [id number]  Primary Care Physician: Little Barker MD  Date of admission: 2023    Subjective   Subjective     Chief Complaint:  Chest pain     HPI:  Joni Hoffman is a 78 y.o. male w/ a hx of CAD (s/p stent x2), HTN, HLD, T2DM, hypothyroidism who presented to the ED w/ c/o chest pain.   Pt c/o intermittent chest pain w/ exertion x 2-3 days. Pt admitted to [facility] overnight on Monday and was d/c home Tuesday. Pt's cardiac enzymes were reportedly elevated. Pt has continued to have exertional chest pain prompting his ED visit tonight. Pt c/o associated dyspnea. Pain located central chest w/ no radiation. Pt follows w/ Dr. Dill.   Pt evaluated in the ED. Troponin 124 and 140. EKG stable. Pt admitted to the hospital medicine service for further evaluation.     Review of Systems   Constitutional: Negative.  Negative for chills, diaphoresis, fatigue, fever and unexpected weight change.   HENT: Negative.  Negative for congestion, postnasal drip, rhinorrhea, sinus pressure, sinus pain, sneezing, sore throat and trouble swallowing.    Eyes: Negative.  Negative for visual disturbance.   Respiratory:  Positive for chest tightness and shortness of breath. Negative for cough and wheezing.    Cardiovascular:  Positive for chest pain. Negative for palpitations and leg swelling.   Gastrointestinal: Negative.  Negative for abdominal distention, abdominal pain, blood in stool, constipation, diarrhea, nausea and vomiting.   Endocrine: Negative.    Genitourinary: Negative.  Negative for decreased urine volume, difficulty urinating, dysuria, hematuria and urgency.   Musculoskeletal: Negative.  Negative for arthralgias, myalgias, neck pain and neck stiffness.   Allergic/Immunologic: Negative.  Negative for immunocompromised state.   Neurological: Negative.  Negative for  dizziness, tremors, seizures, syncope, facial asymmetry, speech difficulty, weakness, light-headedness, numbness and headaches.   Hematological: Negative.  Does not bruise/bleed easily.   Psychiatric/Behavioral: Negative.  Negative for confusion.    All other systems reviewed and are negative.     Personal History     Past Medical History:   Diagnosis Date    Acid indigestion     related to meds    Arthritis     Coronary artery disease 2000    s/p 2 stents after MI     Diabetes mellitus 2007    po meds; checks blood sugars at home run     Disease of thyroid gland     hypothyroidism     Hearing loss     uses hearing aids     History of kidney stones     passed    Hypertension     Lumbar back pain     MI, old 2000    2 stents inserted    Wears glasses     Wears hearing aid      Past Surgical History:   Procedure Laterality Date    APPENDECTOMY      CATARACT EXTRACTION WITH INTRAOCULAR LENS IMPLANT Bilateral     COLONOSCOPY      CORONARY STENT PLACEMENT  2000    x2    INTERVENTIONAL RADIOLOGY PROCEDURE N/A 12/31/2019    Procedure: myelogram lumbar spine;  Surgeon: Karan Pennington MD;  Location:  [facility];  Service: Interventional Radiology    LUMBAR DISCECTOMY Left 10/27/2017    Procedure: LUMBAR DISCECTOMY L5-S1 LEFT ;  Surgeon: Elbert De Anda MD;  Location:  [facility];  Service:     LUMBAR DISCECTOMY Left 2/19/2020    Procedure: RE-DO LUMBAR DISCECTOMY L5-S1 LEFT;  Surgeon: Elebrt De Anda MD;  Location:  [facility];  Service: Neurosurgery;  Laterality: Left;    NASAL POLYP SURGERY       Family History: family history includes Diabetes in his sister; Heart disease in his brother, father, and mother.     Social History:  reports that he quit smoking about 38 years ago. His smoking use included cigarettes. He has a 25.00 pack-year smoking history. His smokeless tobacco use includes chew. He reports that he does not drink alcohol and does not use drugs.  Social History     Social History Narrative     Not on file     Medications:  Evolocumab, Insulin NPH Isophane & Regular, Insulin Pen Needle, Nutritional Supplements, TRUEplus Lancets 30G, aspirin, atenolol, chlorhexidine, cyclobenzaprine, famotidine, fluticasone, glimepiride, glucose blood, ibuprofen, levothyroxine, mometasone, montelukast, tamsulosin, trandolapril, and triamterene-hydrochlorothiazide    Allergies   Allergen Reactions    Hydrocodone-Acetaminophen GI Intolerance    Penicillins Rash     Objective   Objective     Vital Signs:   Temp:  [98.2 °F (36.8 °C)-98.8 °F (37.1 °C)] 98.8 °F (37.1 °C)  Heart Rate:  [64-87] 71  Resp:  [18-20] 18  BP: (134-184)/(68-99) 156/70  Flow (L/min):  [3] 3    Physical Exam     Constitutional: Awake, alert; non-toxic appearing   Eyes: PERRLA, sclerae anicteric, no conjunctival injection  HENT: NCAT, mucous membranes moist  Neck: Supple, no thyromegaly, no lymphadenopathy, trachea midline  Respiratory: Clear to auscultation bilaterally, nonlabored respirations   Cardiovascular: RRR, no murmurs, rubs, or gallops, no peripheral edema   Gastrointestinal: Positive bowel sounds, soft, nontender, nondistended  Musculoskeletal: Normal ROM bilaterally   Psychiatric: Appropriate affect, cooperative  Neurologic: Oriented x 3, strength symmetric in all extremities, Cranial Nerves grossly intact to confrontation, speech clear  Skin: No rashes, lesions or wounds     Result Review:  I have personally reviewed the results from the time of this admission to 12/13/2023 00:38 EST and agree with these findings:  [x]  Laboratory list / accordion  []  Microbiology  [x]  Radiology  [x]  EKG/Telemetry   []  Cardiology/Vascular   []  Pathology  [x]  Old records    LAB RESULTS:      Lab 12/12/23 2133 12/12/23 1920   WBC  --  9.01   HEMOGLOBIN  --  15.4   HEMATOCRIT  --  47.3   PLATELETS  --  200   NEUTROS ABS  --  5.27   IMMATURE GRANS (ABS)  --  0.02   LYMPHS ABS  --  1.90   MONOS ABS  --  1.09*   EOS ABS  --  0.67*   MCV  --  93.5    PROCALCITONIN 0.07  --    PROTIME  --  12.5   APTT  --  33.5*   HEPARIN ANTI-XA  --  0.10*   D DIMER QUANT  --  0.32         Lab 12/12/23 1920   SODIUM 142   POTASSIUM 4.4   CHLORIDE 102   CO2 29.0   ANION GAP 11.0   BUN 24*   CREATININE 1.25   EGFR 58.9*   GLUCOSE 188*   CALCIUM 9.5         Lab 12/12/23 1920   TOTAL PROTEIN 7.0   ALBUMIN 4.0   GLOBULIN 3.0   ALT (SGPT) 18   AST (SGOT) 20   BILIRUBIN 0.2   ALK PHOS 72   LIPASE 41         Lab 12/12/23 2133 12/12/23 1920   PROBNP  --  733.6   HSTROP T 140* 124*   PROTIME  --  12.5   INR  --  0.93                 Brief Urine Lab Results       None          Microbiology Results (last 10 days)       ** No results found for the last 240 hours. **          XR Chest 1 View    Result Date: 12/12/2023  XR CHEST 1 VW Date of Exam: 12/12/2023 7:13 PM EST Indication: Chest Pain Triage Protocol Comparison: None available. Findings: Lines: None Lungs: Nonspecific minimal patchy opacities in the peripheral aspect of the lung bases bilaterally. Otherwise the lungs are clear Pleura: No pleural effusion or pneumothorax. Cardiomediastinum: The cardiomediastinal silhouette is normal Soft Tissues: Unremarkable. Bones: No acute osseous abnormality.     Impression: Impression: Minimal patchy opacities in the peripheral lung bases bilaterally most likely represents atelectasis, although pulmonary infiltrates not completely excluded. Electronically Signed: Enrike Walters DO  12/12/2023 7:52 PM EST  Workstation ID: [id number]       Assessment & Plan   Assessment & Plan       NSTEMI (non-ST elevated myocardial infarction)    HTN (hypertension)    CAD (coronary artery disease)    Hypothyroidism (acquired)    T2DM (type 2 diabetes mellitus)    Nocturnal hypoxia    HLD (hyperlipidemia)    Joni Hoffman is a 78 y.o. male w/ a hx of CAD (s/p stent x2), HTN, HLD, T2DM, hypothyroidism who presented to the ED w/ c/o chest pain.     **NSTEMI   **CAD (s/p stent x2)  **HTN, HLD  -CXR c/w  Minimal patchy opacities in the peripheral lung bases bilaterally most likely represents atelectasis, although pulmonary infiltrates not completely excluded.  -procal and WBC both WNL, lactic acid pending; pt denies cough   -troponin 124, 140; trend   -EKG stable; repeat in am   -s/p Plavix 300mg x1 given in ED  -s/p ASA 324mg given in ED; continue 81mg daily   -heparin infusion per protocol   -nitrostat ointment placed in ED  -holding routine atenolol, Mavik and Dyazide pending possible cath vs stress (current CrCl 52)  -pt receives repatha injections q 2 weeks  -NPO after MN  -tsh, FLP, hem A1c w/ am labs   -symptom mgt  -Cardiology consult in am; known to Dr. Dill     **T2DM  -hem A1c pending  -holding Novolin and glimepiride   -FSBG q 6 hours w/ LDSS (while NPO)    **Nocturnal hypoxia   -pt denies hx of of ERIN and does not wear oxygen at home   -RN noted pt to be 87% on room air while sleeping and placed pt on 2-3 lters  -monitor, possible needs referral for sleep study     **Hypothyroidism   -tsh pending   -continue synthroid     DVT prophylaxis:  Heparin infusion     CODE STATUS:    Code Status (Patient has no pulse and is not breathing): CPR (Attempt to Resuscitate)  Medical Interventions (Patient has pulse or is breathing): Full Support    Expected Discharge  Expected Discharge Date: 12/14/2023; Expected Discharge Time:     Signature: Electronically signed by PILI Hernandez, 12/13/23, 12:47 AM EST.    Time spent: 55 minutes

## 2023-12-13 NOTE — CASE MANAGEMENT/SOCIAL WORK
Discharge Planning Assessment  [facility]     Patient Name: Joni Hoffman  MRN: [id number]  Today's Date: 12/13/2023    Admit Date: 12/12/2023    Plan: Home   Discharge Needs Assessment       Row Name 12/13/23 0803       Living Environment    People in Home spouse    Name(s) of People in Home Emily Hoffman    Current Living Arrangements home    Potentially Unsafe Housing Conditions none    Primary Care Provided by self    Provides Primary Care For no one    Family Caregiver if Needed spouse    Family Caregiver Names Emily Hoffman    Quality of Family Relationships helpful;involved;supportive    Able to Return to Prior Arrangements yes       Resource/Environmental Concerns    Resource/Environmental Concerns home accessibility    Home Accessibility Concerns stairs to enter home;stairs to access bedroom or bathroom       Transition Planning    Patient/Family Anticipates Transition to home    Patient/Family Anticipated Services at Transition none    Transportation Anticipated family or friend will provide       Discharge Needs Assessment    Readmission Within the Last 30 Days no previous admission in last 30 days    Equipment Currently Used at Home glucometer    Concerns to be Addressed denies needs/concerns at this time;discharge planning    Anticipated Changes Related to Illness none    Equipment Needed After Discharge none    Discharge Coordination/Progress Home                   Discharge Plan       Row Name 12/13/23 0804       Plan    Plan Home    Patient/Family in Agreement with Plan yes    Plan Comments Spoke with patient and spouse at bedside regarding discharge planning.  Patient denies use of HH and reports he has a Glucometer at home and has access to a Straight Cane and Rolling Walker if ever he needs one.  He reports haveing prescritpion coverage and medications are affordable.  Patient lives with his spouse in a split level home and access the main floor using stair with his bedroom and all  he needs on that level. Patient unsure of what procedures he will be having, suspects either having a HC or Stress Test.   No discharge needs verbalized.  CM following.  Patient plan is to discharge home pending results of testing.    Final Discharge Disposition Code 01 - home or self-care                  Continued Care and Services - Admitted Since 12/12/2023    Coordination has not been started for this encounter.       Expected Discharge Date and Time       Expected Discharge Date Expected Discharge Time    Dec 14, 2023            Demographic Summary       Row Name 12/13/23 0802       General Information    Admission Type inpatient    Arrived From home    Referral Source admission list    Reason for Consult discharge planning    Preferred Language English    General Information Comments Tyrese Barker MD       Contact Information    Permission Granted to Share Info With     Contact Information Comments Emily Rodriguez, spouse  [phone]  Claudia Harris, daughter  [phone]  Maricarmen Baptiste, daughter  [phone]                   Functional Status       Row Name 12/13/23 0803       Functional Status    Usual Activity Tolerance good    Current Activity Tolerance good       Functional Status, IADL    Medications independent    Meal Preparation independent    Housekeeping independent    Laundry independent    Shopping independent       Employment/    Employment/ Comments [employer] Replacement                   Psychosocial    No documentation.                  Abuse/Neglect    No documentation.                  Legal    No documentation.                  Substance Abuse    No documentation.                  Patient Forms    No documentation.                     Indigo Mendosa RN

## 2023-12-13 NOTE — NURSING NOTE
Patient fell asleep and had drop in O2 saturation, patient snoring loudly, O2 sat 83-85% on room air, when patient awake O2 sat increased back to 95%, patient put on 3L nasal cannula oxygen while sleeping, O2 sat 92-95% while asleep after that, service aware spoke with Abbey Araujo, midlevel hospitalist, care ongoing, TGS RN

## 2023-12-13 NOTE — PLAN OF CARE
Problem: Adult Inpatient Plan of Care  Goal: Plan of Care Review  Outcome: Ongoing, Progressing  Flowsheets (Taken 12/13/2023 0101)  Progress: no change  Plan of Care Reviewed With: patient  Outcome Evaluation: Patient admitted to room 460, no c/o pain upon arrival, VSS, family at , care ongoing TGS RN  Goal: Patient-Specific Goal (Individualized)  Outcome: Ongoing, Progressing  Goal: Absence of Hospital-Acquired Illness or Injury  Outcome: Ongoing, Progressing  Intervention: Identify and Manage Fall Risk  Recent Flowsheet Documentation  Taken 12/12/2023 2302 by Keyanna Lehman RN  Safety Promotion/Fall Prevention:   activity supervised   assistive device/personal items within reach   clutter free environment maintained   lighting adjusted   mobility aid in reach   nonskid shoes/slippers when out of bed   room organization consistent   safety round/check completed  Intervention: Prevent Skin Injury  Recent Flowsheet Documentation  Taken 12/12/2023 2302 by Keyanna Lehman RN  Body Position: position changed independently  Intervention: Prevent and Manage VTE (Venous Thromboembolism) Risk  Recent Flowsheet Documentation  Taken 12/12/2023 2302 by Keyanna Lehman RN  VTE Prevention/Management: (heparin gtt) other (see comments)  Range of Motion: active ROM (range of motion) encouraged  Intervention: Prevent Infection  Recent Flowsheet Documentation  Taken 12/12/2023 2302 by Keyanna Lehman RN  Infection Prevention:   single patient room provided   rest/sleep promoted  Goal: Optimal Comfort and Wellbeing  Outcome: Ongoing, Progressing  Intervention: Provide Person-Centered Care  Recent Flowsheet Documentation  Taken 12/12/2023 2302 by Keyanna Lehman RN  Trust Relationship/Rapport:   care explained   questions answered   questions encouraged   reassurance provided   thoughts/feelings acknowledged  Goal: Readiness for Transition of Care  Outcome: Ongoing, Progressing   Goal Outcome Evaluation:  Plan of Care  Reviewed With: patient        Progress: no change  Outcome Evaluation: Patient admitted to room 460, no c/o pain upon arrival, VSS, family at BS, care ongoing TGS RN

## 2023-12-13 NOTE — PROGRESS NOTES
[facility] Medicine Services  PROGRESS NOTE    Patient Name: Joni Hoffman  : 1945  MRN: [id number]    Date of Admission: 2023  Primary Care Physician: Little Barker MD    Subjective   Subjective     CC:  Nstemi, triple vessel cad    HPI:  No dyspnea  No chest pain at rest  No abd pain  No n/v/d      Objective   Objective     Vital Signs:   Temp:  [97.3 °F (36.3 °C)-98.8 °F (37.1 °C)] 98.5 °F (36.9 °C)  Heart Rate:  [63-87] 83  Resp:  [18-20] 18  BP: (120-184)/(63-99) 126/66  Flow (L/min):  [3] 3     Physical Exam:  Constitutional:Alert, oriented x 3, nontoxic appearing  Psych:Normal/appropriate affect  HEENT:NCAT, oropharynx clear  Neck: neck supple, full range of motion  Neuro: Face symmetric, speech clear, equal , moves all extremities  Cardiac: RRR; No pretibial pitting edema  Resp: CTAB, normal effort  GI: abd soft, non-tender, obese  Skin: No extremity rash  Musculoskeletal/extremities: no cyanosis of extremities; no significant ankle edema          Results Reviewed:  LAB RESULTS:      Lab 23  1056 23  0331 23  0040 23  2133 23  1920   WBC  --  7.23  --   --  9.01   HEMOGLOBIN  --  13.9  --   --  15.4   HEMATOCRIT  --  41.7  --   --  47.3   PLATELETS  --  176  --   --  200   NEUTROS ABS  --  4.08  --   --  5.27   IMMATURE GRANS (ABS)  --  0.02  --   --  0.02   LYMPHS ABS  --  1.55  --   --  1.90   MONOS ABS  --  0.90  --   --  1.09*   EOS ABS  --  0.63*  --   --  0.67*   MCV  --  90.8  --   --  93.5   PROCALCITONIN  --   --   --  0.07  --    LACTATE  --   --  0.8  --   --    PROTIME  --   --   --   --  12.5   APTT  --   --   --   --  33.5*   HEPARIN ANTI-XA 0.30 0.25*  --   --  0.10*   D DIMER QUANT  --   --   --   --  0.32         Lab 23  0331 230   SODIUM 141 142   POTASSIUM 3.8 4.4   CHLORIDE 105 102   CO2 26.0 29.0   ANION GAP 10.0 11.0   BUN 21 24*   CREATININE 1.07 1.25   EGFR 71.0 58.9*   GLUCOSE 105*  188*   CALCIUM 8.9 9.5   MAGNESIUM 1.8  --    HEMOGLOBIN A1C 7.00*  --    TSH 2.640  --          Lab 12/12/23 1920   TOTAL PROTEIN 7.0   ALBUMIN 4.0   GLOBULIN 3.0   ALT (SGPT) 18   AST (SGOT) 20   BILIRUBIN 0.2   ALK PHOS 72   LIPASE 41         Lab 12/13/23  0040 12/12/23  2133 12/12/23 1920   PROBNP  --   --  733.6   HSTROP T 154* 140* 124*   PROTIME  --   --  12.5   INR  --   --  0.93         Lab 12/13/23  0331   CHOLESTEROL 103   LDL CHOL 47   HDL CHOL 34*   TRIGLYCERIDES 122             Brief Urine Lab Results       None            Microbiology Results Abnormal       None            Cardiac Catheterization/Vascular Study    Result Date: 12/13/2023    Severe triple-vessel disease Evaluation for coronary artery bypass graft     XR Chest 1 View    Result Date: 12/12/2023  XR CHEST 1 VW Date of Exam: 12/12/2023 7:13 PM EST Indication: Chest Pain Triage Protocol Comparison: None available. Findings: Lines: None Lungs: Nonspecific minimal patchy opacities in the peripheral aspect of the lung bases bilaterally. Otherwise the lungs are clear Pleura: No pleural effusion or pneumothorax. Cardiomediastinum: The cardiomediastinal silhouette is normal Soft Tissues: Unremarkable. Bones: No acute osseous abnormality.     Impression: Impression: Minimal patchy opacities in the peripheral lung bases bilaterally most likely represents atelectasis, although pulmonary infiltrates not completely excluded. Electronically Signed: Enrike Walters DO  12/12/2023 7:52 PM EST  Workstation ID: [id number]         Current medications:  Scheduled Meds:aspirin, 81 mg, Oral, Daily  [START ON 12/15/2023] ceFAZolin, 2,000 mg, Intravenous, Once  [START ON 12/14/2023] chlorhexidine, 15 mL, Mouth/Throat, Q12H  famotidine, 10 mg, Oral, Daily  insulin regular, 2-7 Units, Subcutaneous, Q6H  levothyroxine, 137 mcg, Oral, Q AM  montelukast, 10 mg, Oral, Nightly  [START ON 12/14/2023] mupirocin, 1 application , Each Nare, Q12H  pantoprazole, 40 mg,  Intravenous, Q AM  pharmacy consult - MTM, , Does not apply, Daily  senna-docusate sodium, 2 tablet, Oral, BID  sodium chloride, 10 mL, Intravenous, Q12H  sodium chloride, 10 mL, Intravenous, Q12H  tamsulosin, 0.4 mg, Oral, Daily      Continuous Infusions:heparin, 12 Units/kg/hr, Last Rate: 12 Units/kg/hr (12/13/23 0524)  Pharmacy to Dose Heparin,   [START ON 12/14/2023] sodium chloride, 100 mL/hr      PRN Meds:.  senna-docusate sodium **AND** polyethylene glycol **AND** bisacodyl **AND** bisacodyl    [START ON 12/14/2023] Chlorhexidine Gluconate Cloth    dextrose    dextrose    glucagon (human recombinant)    ipratropium-albuterol    Pharmacy to Dose Heparin    sodium chloride    sodium chloride    sodium chloride    sodium chloride    [START ON 12/15/2023] sodium chloride    Assessment & Plan   Assessment & Plan     Active Hospital Problems    Diagnosis  POA    **NSTEMI (non-ST elevated myocardial infarction) [I21.4]  Yes    Nocturnal hypoxia [G47.34]  Yes    HLD (hyperlipidemia) [E78.5]  Yes    CAD (coronary artery disease) [I25.10]  Yes    Hypothyroidism (acquired) [E03.9]  Yes    T2DM (type 2 diabetes mellitus) [E11.9]  Yes    NSTEMI, initial episode of care [I21.4]  Unknown    HTN (hypertension) [I10]  Yes      Resolved Hospital Problems   No resolved problems to display.        Brief Hospital Course to date:  Joni Hoffman is a 78 y.o. male w/ hx insulin dep dm2, cad w/ previous stent placed remotely who presented with recent onset of exertional chest pain. Troponin was markedly elevated. Given plavix load, asa, heparin drip and admitted to hospitalist service. Cardiology consulted. LHC was performed 12/13/23 which revealed triple vessel cad and CT surgery was consulted regarding evaluation for cabg.    Triple vessel CAD  NSTEMI  HTN  HL  -s/p plavix load on 12/12 evening  -continue asa, heparin  -LHC 12/13/23 by Dr. Mena: severe triple vessel cad  -cards follows (Dr. Mcmanus): consider addition  b-blocker, arb. Defer to cards  -CT surgery consulted: echo, carotid duplex pending, p2y12 level ordered. Holding plavix. Tentatively planning CABG Friday 12/15/23    Insulin dep DM2 (A1c 7.0)  -sliding scale humalog    Hypothyroidism  -tsh ok  -continue levothyroxine    Am labs: cbc,bmp,mag    Expected Discharge Location and Transportation: home  Expected Discharge ? 12/19/23 depending on post CABG course    DVT prophylaxis:  Medical DVT prophylaxis orders are present.     AM-PAC 6 Clicks Score (PT): 24 (12/13/23 0900)    CODE STATUS:   Code Status and Medical Interventions:   Ordered at: 12/13/23 0025     Code Status (Patient has no pulse and is not breathing):    CPR (Attempt to Resuscitate)     Medical Interventions (Patient has pulse or is breathing):    Full Support       Joe Zabala MD  12/13/23

## 2023-12-13 NOTE — ED NOTES
Joni Hoffman    Nursing Report ED to Floor:  Mental status: a&o x 4  Ambulatory status: ambulatory  Oxygen Therapy:  room air   Cardiac Rhythm: ns  Admitted from: ed  Safety Concerns:  none  Social Issues: none  ED Room #:  8    ED Nurse Phone Extension - [phone] or may call [phone].      HPI:   Chief Complaint   Patient presents with    Chest Pain       Past Medical History:  Past Medical History:   Diagnosis Date    Acid indigestion     related to meds    Arthritis     Coronary artery disease 2000    s/p 2 stents after MI     Diabetes mellitus 2007    po meds; checks blood sugars at home run     Disease of thyroid gland     hypothyroidism     Hearing loss     uses hearing aids     History of kidney stones     passed    Hypertension     Lumbar back pain     MI, old 2000    2 stents inserted    Wears glasses     Wears hearing aid         Past Surgical History:  Past Surgical History:   Procedure Laterality Date    APPENDECTOMY      CATARACT EXTRACTION WITH INTRAOCULAR LENS IMPLANT Bilateral     COLONOSCOPY      CORONARY STENT PLACEMENT  2000    x2    INTERVENTIONAL RADIOLOGY PROCEDURE N/A 12/31/2019    Procedure: myelogram lumbar spine;  Surgeon: Karan Pennington MD;  Location:  [facility];  Service: Interventional Radiology    LUMBAR DISCECTOMY Left 10/27/2017    Procedure: LUMBAR DISCECTOMY L5-S1 LEFT ;  Surgeon: Elbert De Anda MD;  Location:  [facility];  Service:     LUMBAR DISCECTOMY Left 2/19/2020    Procedure: RE-DO LUMBAR DISCECTOMY L5-S1 LEFT;  Surgeon: Elbert De Anda MD;  Location:  [facility];  Service: Neurosurgery;  Laterality: Left;    NASAL POLYP SURGERY          Admitting Doctor:   Isamar Kuo DO    Consulting Provider(s):  Consults       No orders found from 11/13/2023 to 12/13/2023.             Admitting Diagnosis:   The encounter diagnosis was NSTEMI (non-ST elevated myocardial infarction).    Most Recent Vitals:   Vitals:    12/12/23 1911 12/12/23 2045 12/12/23 2100  "12/12/23 2200   BP: (!) 184/97 144/99 134/68 180/82   BP Location: Left arm   Right arm   Patient Position: Sitting   Lying   Pulse: 87 71 67 64   Resp: 20   18   Temp: 98.2 °F (36.8 °C)      TempSrc: Oral      SpO2: 98% 96% 93% 95%   Weight: 93 kg (205 lb)      Height: 167.6 cm (66\")          Active LDAs/IV Access:   Lines, Drains & Airways       Active LDAs       Name Placement date Placement time Site Days    Peripheral IV 12/12/23 1918 Left Antecubital 12/12/23 1918  Antecubital  less than 1                    Labs (abnormal labs have a star):   Labs Reviewed   TROPONIN - Abnormal; Notable for the following components:       Result Value    HS Troponin T 124 (*)     All other components within normal limits    Narrative:     High Sensitive Troponin T Reference Range:  <14.0 ng/L- Negative Female for AMI  <22.0 ng/L- Negative Male for AMI  >=14 - Abnormal Female indicating possible myocardial injury.  >=22 - Abnormal Male indicating possible myocardial injury.   Clinicians would have to utilize clinical acumen, EKG, Troponin, and serial changes to determine if it is an Acute Myocardial Infarction or myocardial injury due to an underlying chronic condition.        COMPREHENSIVE METABOLIC PANEL - Abnormal; Notable for the following components:    Glucose 188 (*)     BUN 24 (*)     eGFR 58.9 (*)     All other components within normal limits    Narrative:     GFR Normal >60  Chronic Kidney Disease <60  Kidney Failure <15    The GFR formula is only valid for adults with stable renal function between ages 18 and 70.   CBC WITH AUTO DIFFERENTIAL - Abnormal; Notable for the following components:    Monocyte % 12.1 (*)     Eosinophil % 7.4 (*)     Monocytes, Absolute 1.09 (*)     Eosinophils, Absolute 0.67 (*)     All other components within normal limits   HIGH SENSITIVITIY TROPONIN T 2HR - Abnormal; Notable for the following components:    HS Troponin T 140 (*)     Troponin T Delta 16 (*)     All other components " within normal limits    Narrative:     High Sensitive Troponin T Reference Range:  <14.0 ng/L- Negative Female for AMI  <22.0 ng/L- Negative Male for AMI  >=14 - Abnormal Female indicating possible myocardial injury.  >=22 - Abnormal Male indicating possible myocardial injury.   Clinicians would have to utilize clinical acumen, EKG, Troponin, and serial changes to determine if it is an Acute Myocardial Infarction or myocardial injury due to an underlying chronic condition.        HEPARIN ANTI XA - Abnormal; Notable for the following components:    Heparin Anti-Xa (UFH) 0.10 (*)     All other components within normal limits   APTT - Abnormal; Notable for the following components:    PTT 33.5 (*)     All other components within normal limits    Narrative:     PTT = The equivalent PTT values for the therapeutic range of heparin levels at 0.3 to 0.5 U/ml are 60 to 70 seconds.   LIPASE - Normal   BNP (IN-HOUSE) - Normal    Narrative:     This assay is used as an aid in the diagnosis of individuals suspected of having heart failure. It can be used as an aid in the diagnosis of acute decompensated heart failure (ADHF) in patients presenting with signs and symptoms of ADHF to the emergency department (ED). In addition, NT-proBNP of <300 pg/mL indicates ADHF is not likely.    Age Range Result Interpretation  NT-proBNP Concentration (pg/mL:      <50             Positive            >450                   Gray                 300-450                    Negative             <300    50-75           Positive            >900                  Gray                300-900                  Negative            <300      >75             Positive            >1800                  Gray                300-1800                  Negative            <300   D-DIMER, QUANTITATIVE - Normal    Narrative:     According to the assay 's published package insert, a normal (<0.50 MCGFEU/mL) D-dimer result in conjunction with a non-high  "clinical probability assessment, excludes deep vein thrombosis (DVT) and pulmonary embolism (PE) with high sensitivity.    D-dimer values increase with age and this can make VTE exclusion of an older population difficult. To address this, the [employer], based on best available evidence and recent guidelines, recommends that clinicians use age-adjusted D-dimer thresholds in patients greater than 50 years of age with: a) a low probability of PE who do not meet all Pulmonary Embolism Rule Out Criteria, or b) in those with intermediate probability of PE.   The formula for an age-adjusted D-dimer cut-off is \"age/100\".  For example, a 60 year old patient would have an age-adjusted cut-off of 0.60 MCGFEU/mL and an 80 year old 0.80 MCGFEU/mL.   PROTIME-INR - Normal   RAINBOW DRAW    Narrative:     The following orders were created for panel order [facility] Draw.  Procedure                               Abnormality         Status                     ---------                               -----------         ------                     Green Top (Gel)[[id number]]                                  Final result               Lavender Top[[id number]]                                     Final result               Gold Top - SST[[id number]]                                   Final result               Mackenzie Top[[id number]]                                         In process                 Light Blue Top[[id number]]                                   Final result                 Please view results for these tests on the individual orders.   HEPARIN ANTI XA   CBC WITH AUTO DIFFERENTIAL   CBC AND DIFFERENTIAL    Narrative:     The following orders were created for panel order CBC & Differential.  Procedure                               Abnormality         Status                     ---------                               -----------         ------                     CBC Auto Differential[[id number]]        Abnormal         "    Final result                 Please view results for these tests on the individual orders.   GREEN TOP   LAVENDER TOP   GOLD TOP - SST   LIGHT BLUE TOP   GRAY TOP   CBC AND DIFFERENTIAL    Narrative:     The following orders were created for panel order CBC & Differential.  Procedure                               Abnormality         Status                     ---------                               -----------         ------                     CBC Auto Differential[[id number]]                                                         Please view results for these tests on the individual orders.       Meds Given in ED:   Medications   sodium chloride 0.9 % flush 10 mL (has no administration in time range)   heparin 42954 units/250 mL (100 units/mL) in 0.45 % NaCl infusion (10.8 Units/kg/hr × 93 kg Intravenous New Bag 12/12/23 2129)   Pharmacy to Dose Heparin (has no administration in time range)   aspirin chewable tablet 324 mg (324 mg Oral Given 12/12/23 2132)   nitroglycerin (NITROSTAT) ointment 1 inch (1 inch Topical Given 12/12/23 2128)   clopidogrel (PLAVIX) tablet 300 mg (300 mg Oral Given 12/12/23 2128)   heparin (porcine) injection 4,000 Units (4,000 Units Intravenous Given 12/12/23 2123)     heparin, 10.8 Units/kg/hr, Last Rate: 10.8 Units/kg/hr (12/12/23 2129)  Pharmacy to Dose Heparin,

## 2023-12-13 NOTE — CONSULTS
Cardiology Consult - [facility]    Joni Hoffman     S[address]  1945  [address] [facility] [address]         Admission Date:  12/12/2023    Consultation Date:  12/13/23        PCP:  Little Barker MD  Referring MD:  Dr. Kuo  Consulting MD:  Dr. Magan Mcmanus  Primary Cardiologist:  Dr. Dill        CC:  Chest Pain    Reason for Consult: NSTEMI, unstable angina          Allergies:  is allergic to hydrocodone-acetaminophen and penicillins.    Medications Prior to Admission   Medication Sig Dispense Refill Last Dose    aspirin 81 MG EC tablet Take 1 tablet by mouth Daily.   12/12/2023 at 1800    atenolol (TENORMIN) 25 MG tablet Take 1 tablet by mouth Daily With Breakfast.  3 12/12/2023 at 0900    Droplet Pen Needles 32G X 4 MM misc    12/12/2023    famotidine (PEPCID) 10 MG tablet Take 1 tablet by mouth Every Morning.   12/12/2023    fluticasone (FLONASE) 50 MCG/ACT nasal spray    12/12/2023    glimepiride (AMARYL) 4 MG tablet Take 1 tablet by mouth 2 (Two) Times a Day. Takes 4 mg in the am and 2 mg in the PM  1 12/12/2023    ibuprofen (ADVIL,MOTRIN) 600 MG tablet TAKE 1 TABLET BY MOUTH THREE TIMES DAILY AS NEEDED FOR BACK PAIN *TAKE WITH ACETAMINOPHEN   12/12/2023    levothyroxine (SYNTHROID, LEVOTHROID) 137 MCG tablet TAKE 1 TABLET BY MOUTH EVERY MORNING ON EMPTY STOMACH   12/12/2023    mometasone (NASONEX) 50 MCG/ACT nasal spray 2 sprays into the nostril(s) as directed by provider Every Morning.   12/12/2023    montelukast (SINGULAIR) 10 MG tablet Take 1 tablet by mouth Every Night.   12/12/2023    NovoLIN 70/30 FlexPen (70-30) 100 UNIT/ML suspension pen-injector    12/11/2023    Repatha SureClick solution auto-injector SureClick injection ADMINISTER 1 INJECTION UNDER THE SKIN EVERY 2 WEEKS   12/5/2023 at 0900    tamsulosin (FLOMAX) 0.4 MG capsule 24 hr capsule    12/11/2023 at 2100    triamterene-hydrochlorothiazide (DYAZIDE) 37.5-25 MG per capsule    12/12/2023     True Metrix Blood Glucose Test test strip    12/12/2023    TRUEplus Lancets 30G misc    12/12/2023    chlorhexidine (PERIDEX) 0.12 % solution RINSE 1/2 OUNCE (15 ML) IN MOUTH FOR 30 SECONDS THEN SPIT TWICE DAILY   Unknown    cyclobenzaprine (FLEXERIL) 5 MG tablet    Unknown    Nutritional Supplements (JUICE PLUS FIBRE PO) Take 1 dose by mouth Every Morning.       trandolapril (MAVIK) 4 MG tablet Take 1 tablet by mouth Every Morning.  3        heparin, 12 Units/kg/hr, Last Rate: 12 Units/kg/hr (12/13/23 0524)  Pharmacy to Dose Heparin,             HPI:  Joni Hoffman is a 79 yo CM with PMHx CAD (remote stenting ~2000), HTN, HLP, DMII, hypothyroidism, former smoking tobacco abuse.  He developed chest pain, exertional dyspnea over the weekend while climbing up stairs.  This pain and discomfort was intermittent for approximately 2 to 3 days before he sought medical care from his PCP.  EKG was performed showing no acute changes and outpatient cardiac markers were drawn.  He was notified the next day that his cardiac enzymes were elevated and advised to go to his local hospital.  He was seen and evaluated in [facility] emergency room and observed overnight.  He reports there was discussion about transfer to [facility] but this never occurred.  He was discharged home and presented to [facility] ED yesterday on his own accord.  Upon arrival blood pressure 156/70, heart rate 71 regular.  Patient is afebrile without leukocytosis.  Initial  upward trending to 140 and has currently peaked at 154.  He was admitted to hospitalist services overnight cardiology has been asked to consult with patient today.      At time of consultation, He is awake in bed with multiple family members at bedside.  He states as long as he is at rest, he is chest pain-free.  Pain occurs only with exertion and activity such as walking, incline.  Has been so long since his prior cardiac catheterization that he cannot confirm whether this is what he is  "experienced previously or not.  He denies orthopnea, pedal edema or PND.  He denies any recent changes to medical regimen.  He is a former tobacco user/smoker and now currently chews tobacco          Social History     Socioeconomic History    Marital status:    Tobacco Use    Smoking status: Former     Packs/day: 1.00     Years: 25.00     Additional pack years: 0.00     Total pack years: 25.00     Types: Cigarettes     Quit date:      Years since quittin.9    Smokeless tobacco: Current     Types: Chew    Tobacco comments:     hx of cigarette use for 25 years and chewed about 25 years (currently chews on occasion)    Vaping Use    Vaping Use: Never used   Substance and Sexual Activity    Alcohol use: No    Drug use: No    Sexual activity: Defer     Family History   Problem Relation Age of Onset    Heart disease Mother     Heart disease Father     Diabetes Sister     Heart disease Brother      Past Surgical History:   Procedure Laterality Date    APPENDECTOMY      CATARACT EXTRACTION WITH INTRAOCULAR LENS IMPLANT Bilateral     COLONOSCOPY      CORONARY STENT PLACEMENT  2000    x2    INTERVENTIONAL RADIOLOGY PROCEDURE N/A 2019    Procedure: myelogram lumbar spine;  Surgeon: Karan Pennington MD;  Location:  [facility];  Service: Interventional Radiology    LUMBAR DISCECTOMY Left 10/27/2017    Procedure: LUMBAR DISCECTOMY L5-S1 LEFT ;  Surgeon: Elbert De Anda MD;  Location:  [facility];  Service:     LUMBAR DISCECTOMY Left 2020    Procedure: RE-DO LUMBAR DISCECTOMY L5-S1 LEFT;  Surgeon: Elbert De Anda MD;  Location:  [facility];  Service: Neurosurgery;  Laterality: Left;    NASAL POLYP SURGERY       ROS: Pertinent items are noted in HPI, all other systems reviewed and negative     Objective     height is 167.6 cm (66\") and weight is 93 kg (205 lb). His oral temperature is 97.6 °F (36.4 °C). His blood pressure is 120/67 and his pulse is 71. His respiration is 18 and oxygen " saturation is 92%.    Intake/Output Summary (Last 24 hours) at 12/13/2023 0753  Last data filed at 12/13/2023 0518  Gross per 24 hour   Intake 57.9 ml   Output 550 ml   Net -492.1 ml     Intake/Output                   12/12/23 0701 - 12/13/23 0700     [id number] Total              Intake    I.V.  --  57.9 57.9    Total Intake -- 57.9 57.9       Output    Urine  --  550 550    Total Output -- 550 550               12/12/23  1911   Weight: 93 kg (205 lb)          Physical Exam:  General Appearance:    Alert, cooperative, in no acute distress   Head:    Normocephalic, without obvious abnormality, atraumatic   Eyes:            Lids and lashes normal, conjunctivae and sclerae normal, no   icterus, no pallor, corneas clear, PERRLA   Ears:    Ears appear intact with no abnormalities noted   Throat:   No oral lesions, no thrush, oral mucosa moist   Neck:   No adenopathy, supple, trachea midline, no thyromegaly, no carotid bruit, no JVD   Back:     No kyphosis present, no scoliosis present, no skin lesions,    erythema or scars, no tenderness to percussion or                   palpation, range of motion normal   Lungs:     Clear to auscultation,respirations regular, even and               unlabored    Heart:    Regular rhythm and normal rate, normal S1 and S2, no         murmur, no gallop, no rub, no click   Abdomen:     Normal bowel sounds, no masses, no organomegaly, soft     nontender, nondistended, no guarding, no rebound   tenderness   Extremities:   Moves all extremities well,  no cyanosis, no redness, no edema   Pulses:   Pulses palpable and equal bilaterally   Skin:   No bleeding, bruising or rash   Lymph nodes:   No palpable adenopathy   Neurologic:   Cranial nerves 2 - 12 grossly intact, sensation intact, DTR     present and equal bilaterally          Results Review:  I personally reviewed the patient's clinical results.  Results from last 7 days   Lab Units 12/13/23  0331   WBC 10*3/mm3 7.23   HEMOGLOBIN  g/dL 13.9   HEMATOCRIT % 41.7   PLATELETS 10*3/mm3 176     Results from last 7 days   Lab Units 12/13/23  0331 12/12/23 1920   SODIUM mmol/L 141 142   POTASSIUM mmol/L 3.8 4.4   CHLORIDE mmol/L 105 102   CO2 mmol/L 26.0 29.0   BUN mg/dL 21 24*   CREATININE mg/dL 1.07 1.25   CALCIUM mg/dL 8.9 9.5   BILIRUBIN mg/dL  --  0.2   ALK PHOS U/L  --  72   ALT (SGPT) U/L  --  18   AST (SGOT) U/L  --  20   GLUCOSE mg/dL 105* 188*     Results from last 7 days   Lab Units 12/12/23 1920   INR  0.93     Results from last 7 days   Lab Units 12/13/23 0331   TSH uIU/mL 2.640     Results from last 7 days   Lab Units 12/13/23 0331   CHOLESTEROL mg/dL 103   TRIGLYCERIDES mg/dL 122   HDL CHOL mg/dL 34*   LDL CHOL mg/dL 47     Results from last 7 days   Lab Units 12/12/23 1920   PROBNP pg/mL 733.6             Radiology:  Imaging Results (Last 72 Hours)       Procedure Component Value Units Date/Time    XR Chest 1 View [[id number]] Collected: 12/12/23 1951     Updated: 12/12/23 1955    Narrative:      XR CHEST 1 VW    Date of Exam: 12/12/2023 7:13 PM EST    Indication: Chest Pain Triage Protocol    Comparison: None available.    Findings:  Lines: None    Lungs: Nonspecific minimal patchy opacities in the peripheral aspect of the lung bases bilaterally. Otherwise the lungs are clear  Pleura: No pleural effusion or pneumothorax.    Cardiomediastinum: The cardiomediastinal silhouette is normal    Soft Tissues: Unremarkable.    Bones: No acute osseous abnormality.      Impression:      Impression:  Minimal patchy opacities in the peripheral lung bases bilaterally most likely represents atelectasis, although pulmonary infiltrates not completely excluded.      Electronically Signed: Enrike Walters DO    12/12/2023 7:52 PM EST    Workstation ID: [id number]              Tele: NSR    Assessment:  -78-year-old CM with past medical history of CAD, HTN, HLP, DM 2 presents to local hospital after 2 to 3-day history of exertional chest pain and  dyspnea with normal EKG, elevated cardiac markers.  Repeat serial troponins here at [facility] are also elevated, findings consistent with NSTEMI.  -CAD, remote stenting  -HTN  -HLP  -DM 2, A1C  7  -Hypothyroidism on chronic supplementation  -BPH        Plan:  -Admitted to hospitalist services.  Patient is n.p.o. and continued on heparin drip  -Plan for cardiac catheterization plus or minus PCI today.  Risks and benefits have been reviewed with the patient he is willing to proceed.  Further recommendations based on outcomes.  -Continue low-dose aspirin.  He is on Repatha at home.  Patient did receive loading dose Plavix 300 mg  -Continue Synthroid        I discussed the patient's findings and my recommendations with the patient, any present family members, and the nursing staff.  Magan Mcmanus MD saw and examined patient, verified hx and PE, read all radiographic studies, reviewed labs and micro data, and formulated dx, plan for treatment and all medical decision making.      Jennifer Granger PA-C, working with Magan Mcmanus MD  12/13/23 07:53 EST

## 2023-12-13 NOTE — PROGRESS NOTES
HEPARIN INFUSION  Joni Hoffman is a  78 y.o. male receiving heparin infusion.     Therapy for (VTE/Cardiac):   Cardiac  Patient Weight: 93 KG  Initial Bolus (Y/N):   Yes  Any Bolus (Y/N):   Yes    Signs or Symptoms of Bleeding: None noted  Cardiac or Other (Not VTE)   Initial Bolus: 60 units/kg (Max 4,000 units)  Initial rate: 12 units/kg/hr (Max 1,000 units/hr)   Anti Xa Rebolus Infusion Hold time Change infusion Dose (Units/kg/hr) Next Anti Xa or aPTT Level Due   < 0.11 50 Units/kg  (4000 Units Max) None Increase by  3 Units/kg/hr 6 hours   0.11- 0.19 25 Units/kg  (2000 Units Max) None Increase by  2 Units/kg/hr 6 hours   0.2 - 0.29 0 None Increase by  1 Units/kg/hr 6 hours   0.3 - 0.5 0 None No Change 6 hours (after 2 consecutive levels in range check qAM)   0.51 - 0.6 0 None Decrease by  1 Units/kg/hr 6 hours   0.61 - 0.8 0 30 Minutes Decrease by  2 Units/kg/hr 6 hours   0.81 - 1 0 60 Minutes Decrease by  3 Units/kg/hr 6 hours   >1 0 Hold  After Anti Xa less than 0.5 decrease previous rate by  4 Units/kg/hr  Every 2 hours until Anti Xa  less than 0.5 then when infusion restarts in 6 hours   Recommend Xa every 6 hours.   Results from last 7 days   Lab Units 12/13/23  0331 12/12/23  1920   INR   --  0.93   HEMOGLOBIN g/dL 13.9 15.4   HEMATOCRIT % 41.7 47.3   PLATELETS 10*3/mm3 176 200        Date   Time   Anti-Xa Current Rate (Unit/kg/hr) Bolus   (Units) Rate Change   (Unit/kg/hr) New Rate (Unit/kg/hr) Next   Anti-Xa Comments  Pump Check Daily   12/12 19:20 0.10 New start 4000 +10.8 10.8 04:00 IV pump set up with RN. Patient and wife counseled on  Heparin.   12/13 0331 0.25 10.8 -- +1.2 12 1100 D/w PANDA   12/13 1056 0.3 12 -- -- 12 1800 Dw PANDA Carvalho; cardiac cath [id number] today                                                                                                                                                                                                           Pearl Miller, PharmD  11:51  EST   12/13/23

## 2023-12-14 LAB
ANION GAP SERPL CALCULATED.3IONS-SCNC: 9 MMOL/L (ref 5–15)
BH CV XLRA MEAS LEFT DIST CCA EDV: 24.3 CM/SEC
BH CV XLRA MEAS LEFT DIST CCA PSV: 125 CM/SEC
BH CV XLRA MEAS LEFT DIST ICA EDV: 35 CM/SEC
BH CV XLRA MEAS LEFT DIST ICA PSV: 105 CM/SEC
BH CV XLRA MEAS LEFT ICA/CCA RATIO: 0.76
BH CV XLRA MEAS LEFT MID CCA EDV: 29.5 CM/SEC
BH CV XLRA MEAS LEFT MID CCA PSV: 140 CM/SEC
BH CV XLRA MEAS LEFT MID ICA EDV: 32.9 CM/SEC
BH CV XLRA MEAS LEFT MID ICA PSV: 106 CM/SEC
BH CV XLRA MEAS LEFT PROX CCA EDV: 29.5 CM/SEC
BH CV XLRA MEAS LEFT PROX CCA PSV: 157 CM/SEC
BH CV XLRA MEAS LEFT PROX ECA PSV: 166 CM/SEC
BH CV XLRA MEAS LEFT PROX ICA EDV: 17.5 CM/SEC
BH CV XLRA MEAS LEFT PROX ICA PSV: 73.6 CM/SEC
BH CV XLRA MEAS LEFT PROX SCLA PSV: 152 CM/SEC
BH CV XLRA MEAS LEFT VERTEBRAL A PSV: 38.6 CM/SEC
BH CV XLRA MEAS RIGHT DIST CCA EDV: 29.3 CM/SEC
BH CV XLRA MEAS RIGHT DIST CCA PSV: 151 CM/SEC
BH CV XLRA MEAS RIGHT DIST ICA EDV: 28 CM/SEC
BH CV XLRA MEAS RIGHT DIST ICA PSV: 84.8 CM/SEC
BH CV XLRA MEAS RIGHT ICA/CCA RATIO: 0.8
BH CV XLRA MEAS RIGHT MID CCA EDV: 22 CM/SEC
BH CV XLRA MEAS RIGHT MID CCA PSV: 145 CM/SEC
BH CV XLRA MEAS RIGHT MID ICA EDV: 24.5 CM/SEC
BH CV XLRA MEAS RIGHT MID ICA PSV: 76.4 CM/SEC
BH CV XLRA MEAS RIGHT PROX CCA EDV: 18.3 CM/SEC
BH CV XLRA MEAS RIGHT PROX CCA PSV: 161 CM/SEC
BH CV XLRA MEAS RIGHT PROX ECA PSV: 170 CM/SEC
BH CV XLRA MEAS RIGHT PROX ICA EDV: 23.4 CM/SEC
BH CV XLRA MEAS RIGHT PROX ICA PSV: 116 CM/SEC
BH CV XLRA MEAS RIGHT PROX SCLA PSV: 190 CM/SEC
BH CV XLRA MEAS RIGHT VERTEBRAL A PSV: 43.5 CM/SEC
BUN SERPL-MCNC: 18 MG/DL (ref 8–23)
BUN/CREAT SERPL: 17 (ref 7–25)
CALCIUM SPEC-SCNC: 8.6 MG/DL (ref 8.6–10.5)
CHLORIDE SERPL-SCNC: 105 MMOL/L (ref 98–107)
CO2 SERPL-SCNC: 26 MMOL/L (ref 22–29)
CREAT SERPL-MCNC: 1.06 MG/DL (ref 0.76–1.27)
DEPRECATED RDW RBC AUTO: 43.8 FL (ref 37–54)
EGFRCR SERPLBLD CKD-EPI 2021: 71.8 ML/MIN/1.73
ERYTHROCYTE [DISTWIDTH] IN BLOOD BY AUTOMATED COUNT: 13 % (ref 12.3–15.4)
GLUCOSE BLDC GLUCOMTR-MCNC: 123 MG/DL (ref 70–130)
GLUCOSE BLDC GLUCOMTR-MCNC: 155 MG/DL (ref 70–130)
GLUCOSE BLDC GLUCOMTR-MCNC: 163 MG/DL (ref 70–130)
GLUCOSE BLDC GLUCOMTR-MCNC: 191 MG/DL (ref 70–130)
GLUCOSE BLDC GLUCOMTR-MCNC: 211 MG/DL (ref 70–130)
GLUCOSE SERPL-MCNC: 146 MG/DL (ref 65–99)
HCT VFR BLD AUTO: 42.5 % (ref 37.5–51)
HGB BLD-MCNC: 14 G/DL (ref 13–17.7)
LEFT ARM BP: NORMAL MMHG
MAGNESIUM SERPL-MCNC: 1.7 MG/DL (ref 1.6–2.4)
MCH RBC QN AUTO: 30.5 PG (ref 26.6–33)
MCHC RBC AUTO-ENTMCNC: 32.9 G/DL (ref 31.5–35.7)
MCV RBC AUTO: 92.6 FL (ref 79–97)
PA ADP PRP-ACNC: 146 PRU
PLATELET # BLD AUTO: 165 10*3/MM3 (ref 140–450)
PMV BLD AUTO: 9.5 FL (ref 6–12)
POTASSIUM SERPL-SCNC: 4.1 MMOL/L (ref 3.5–5.2)
RBC # BLD AUTO: 4.59 10*6/MM3 (ref 4.14–5.8)
RIGHT ARM BP: NORMAL MMHG
SODIUM SERPL-SCNC: 140 MMOL/L (ref 136–145)
WBC NRBC COR # BLD AUTO: 5.75 10*3/MM3 (ref 3.4–10.8)

## 2023-12-14 PROCEDURE — 99024 POSTOP FOLLOW-UP VISIT: CPT

## 2023-12-14 PROCEDURE — 85027 COMPLETE CBC AUTOMATED: CPT | Performed by: PHYSICIAN ASSISTANT

## 2023-12-14 PROCEDURE — 83735 ASSAY OF MAGNESIUM: CPT | Performed by: INTERNAL MEDICINE

## 2023-12-14 PROCEDURE — 25810000003 SODIUM CHLORIDE 0.9 % SOLUTION: Performed by: INTERNAL MEDICINE

## 2023-12-14 PROCEDURE — 85576 BLOOD PLATELET AGGREGATION: CPT | Performed by: PHYSICIAN ASSISTANT

## 2023-12-14 PROCEDURE — 82948 REAGENT STRIP/BLOOD GLUCOSE: CPT

## 2023-12-14 PROCEDURE — 63710000001 INSULIN LISPRO (HUMAN) PER 5 UNITS: Performed by: INTERNAL MEDICINE

## 2023-12-14 PROCEDURE — 63710000001 INSULIN DETEMIR PER 5 UNITS: Performed by: INTERNAL MEDICINE

## 2023-12-14 PROCEDURE — 80048 BASIC METABOLIC PNL TOTAL CA: CPT | Performed by: PHYSICIAN ASSISTANT

## 2023-12-14 PROCEDURE — 80307 DRUG TEST PRSMV CHEM ANLYZR: CPT | Performed by: PHYSICIAN ASSISTANT

## 2023-12-14 PROCEDURE — 99232 SBSQ HOSP IP/OBS MODERATE 35: CPT | Performed by: INTERNAL MEDICINE

## 2023-12-14 PROCEDURE — 80305 DRUG TEST PRSMV DIR OPT OBS: CPT

## 2023-12-14 RX ORDER — FAMOTIDINE 20 MG/1
20 TABLET, FILM COATED ORAL ONCE
Status: CANCELLED | OUTPATIENT
Start: 2023-12-14 | End: 2023-12-14

## 2023-12-14 RX ORDER — LEVOTHYROXINE SODIUM 137 UG/1
137 TABLET ORAL DAILY
COMMUNITY

## 2023-12-14 RX ORDER — METOPROLOL SUCCINATE 50 MG/1
50 TABLET, EXTENDED RELEASE ORAL
Status: DISCONTINUED | OUTPATIENT
Start: 2023-12-14 | End: 2023-12-15

## 2023-12-14 RX ORDER — FAMOTIDINE 10 MG/ML
20 INJECTION, SOLUTION INTRAVENOUS ONCE
Status: CANCELLED | OUTPATIENT
Start: 2023-12-14 | End: 2023-12-14

## 2023-12-14 RX ORDER — NITROGLYCERIN 0.4 MG/1
0.4 TABLET SUBLINGUAL
COMMUNITY

## 2023-12-14 RX ADMIN — MONTELUKAST 10 MG: 10 TABLET, FILM COATED ORAL at 22:05

## 2023-12-14 RX ADMIN — INSULIN DETEMIR 5 UNITS: 100 INJECTION, SOLUTION SUBCUTANEOUS at 22:05

## 2023-12-14 RX ADMIN — TAMSULOSIN HYDROCHLORIDE 0.4 MG: 0.4 CAPSULE ORAL at 08:49

## 2023-12-14 RX ADMIN — INSULIN LISPRO 2 UNITS: 100 INJECTION, SOLUTION INTRAVENOUS; SUBCUTANEOUS at 17:46

## 2023-12-14 RX ADMIN — FAMOTIDINE 10 MG: 20 TABLET, FILM COATED ORAL at 08:49

## 2023-12-14 RX ADMIN — INSULIN LISPRO 4 UNITS: 100 INJECTION, SOLUTION INTRAVENOUS; SUBCUTANEOUS at 12:10

## 2023-12-14 RX ADMIN — ASPIRIN 81 MG: 81 TABLET, COATED ORAL at 08:49

## 2023-12-14 RX ADMIN — MUPIROCIN 1 APPLICATION: 20 OINTMENT TOPICAL at 17:46

## 2023-12-14 RX ADMIN — Medication 10 ML: at 22:06

## 2023-12-14 RX ADMIN — LEVOTHYROXINE SODIUM 137 MCG: 0.14 TABLET ORAL at 05:41

## 2023-12-14 RX ADMIN — METOPROLOL SUCCINATE 50 MG: 50 TABLET, EXTENDED RELEASE ORAL at 08:49

## 2023-12-14 RX ADMIN — PANTOPRAZOLE SODIUM 40 MG: 40 INJECTION, POWDER, LYOPHILIZED, FOR SOLUTION INTRAVENOUS at 05:41

## 2023-12-14 RX ADMIN — CHLORHEXIDINE GLUCONATE 15 ML: 1.2 SOLUTION ORAL at 17:46

## 2023-12-14 RX ADMIN — SENNOSIDES AND DOCUSATE SODIUM 2 TABLET: 8.6; 5 TABLET ORAL at 22:05

## 2023-12-14 RX ADMIN — SENNOSIDES AND DOCUSATE SODIUM 2 TABLET: 8.6; 5 TABLET ORAL at 08:49

## 2023-12-14 RX ADMIN — INSULIN LISPRO 2 UNITS: 100 INJECTION, SOLUTION INTRAVENOUS; SUBCUTANEOUS at 22:05

## 2023-12-14 RX ADMIN — SODIUM CHLORIDE 100 ML/HR: 9 INJECTION, SOLUTION INTRAVENOUS at 00:16

## 2023-12-14 NOTE — PROGRESS NOTES
[facility] Medicine Services  PROGRESS NOTE    Patient Name: Joni Hoffman  : 1945  MRN: [id number]    Date of Admission: 2023  Primary Care Physician: Little Barker MD    Subjective   Subjective     CC:  Nstemi, triple vessel cad    HPI:  No chest pain  No dyspnea  No palpitations  Tolerating po      Objective   Objective     Vital Signs:   Temp:  [97.3 °F (36.3 °C)-98.3 °F (36.8 °C)] 97.7 °F (36.5 °C)  Heart Rate:  [70-99] 76  Resp:  [18] 18  BP: (127-176)/(58-89) 163/73  Flow (L/min):  [0] 0     Physical Exam:  Constitutional:Alert, oriented x 3, nontoxic appearing, sitting up in chair comfortable appearing  Psych:Normal/appropriate affect  HEENT:NCAT, oropharynx clear  Neck: neck supple, full range of motion  Neuro: Face symmetric, speech clear, equal , moves all extremities  Cardiac: rrr; No pretibial pitting edema  Resp: ctab, normal effort  GI: abd soft, obese, nontender  Skin: No extremity rash  Musculoskeletal/extremities: no cyanosis of extremities; no significant ankle edema          Results Reviewed:  LAB RESULTS:      Lab 23  0550 23  1830 23  1056 23  0331 23  0040 23  2133 23  1920   WBC 5.75  --   --  7.23  --   --  9.01   HEMOGLOBIN 14.0  --   --  13.9  --   --  15.4   HEMATOCRIT 42.5  --   --  41.7  --   --  47.3   PLATELETS 165  --   --  176  --   --  200   NEUTROS ABS  --   --   --  4.08  --   --  5.27   IMMATURE GRANS (ABS)  --   --   --  0.02  --   --  0.02   LYMPHS ABS  --   --   --  1.55  --   --  1.90   MONOS ABS  --   --   --  0.90  --   --  1.09*   EOS ABS  --   --   --  0.63*  --   --  0.67*   MCV 92.6  --   --  90.8  --   --  93.5   PROCALCITONIN  --   --   --   --   --  0.07  --    LACTATE  --   --   --   --  0.8  --   --    PROTIME  --   --   --   --   --   --  12.5   APTT  --   --   --   --   --   --  33.5*   HEPARIN ANTI-XA  --  0.10* 0.30 0.25*  --   --  0.10*   D DIMER QUANT  --   --    --   --   --   --  0.32         Lab 12/14/23  0550 12/13/23  0331 12/12/23  1920   SODIUM 140 141 142   POTASSIUM 4.1 3.8 4.4   CHLORIDE 105 105 102   CO2 26.0 26.0 29.0   ANION GAP 9.0 10.0 11.0   BUN 18 21 24*   CREATININE 1.06 1.07 1.25   EGFR 71.8 71.0 58.9*   GLUCOSE 146* 105* 188*   CALCIUM 8.6 8.9 9.5   MAGNESIUM 1.7 1.8  --    HEMOGLOBIN A1C  --  7.00*  --    TSH  --  2.640  --          Lab 12/12/23 1920   TOTAL PROTEIN 7.0   ALBUMIN 4.0   GLOBULIN 3.0   ALT (SGPT) 18   AST (SGOT) 20   BILIRUBIN 0.2   ALK PHOS 72   LIPASE 41         Lab 12/13/23  0040 12/12/23  2133 12/12/23 1920   PROBNP  --   --  733.6   HSTROP T 154* 140* 124*   PROTIME  --   --  12.5   INR  --   --  0.93         Lab 12/13/23  0331   CHOLESTEROL 103   LDL CHOL 47   HDL CHOL 34*   TRIGLYCERIDES 122         Lab 12/13/23  1830   ABO TYPING O   RH TYPING Negative   ANTIBODY SCREEN Negative         Brief Urine Lab Results       None            Microbiology Results Abnormal       None            Carotid Duplex - Bilateral    Result Date: 12/14/2023    Right internal carotid artery demonstrates a less than 50% stenosis.   Left internal carotid artery demonstrates a less than 50% stenosis.     Adult Transthoracic Echocardiogram Complete    Result Date: 12/13/2023    Left ventricular systolic function is normal. Calculated left ventricular EF of 64%.  There is hypokinesis of the anteroseptum, inferoseptum, and apex   Left ventricular diastolic function was normal.   Normal right ventricular size and systolic function   No significant valvular abnormality   No pericardial effusion   No LV thrombus on contrasted images   No prior echocardiogram for comparison     Cardiac Catheterization/Vascular Study    Result Date: 12/13/2023    Severe triple-vessel disease Evaluation for coronary artery bypass graft     XR Chest 1 View    Result Date: 12/12/2023  XR CHEST 1 VW Date of Exam: 12/12/2023 7:13 PM EST Indication: Chest Pain Triage Protocol  Comparison: None available. Findings: Lines: None Lungs: Nonspecific minimal patchy opacities in the peripheral aspect of the lung bases bilaterally. Otherwise the lungs are clear Pleura: No pleural effusion or pneumothorax. Cardiomediastinum: The cardiomediastinal silhouette is normal Soft Tissues: Unremarkable. Bones: No acute osseous abnormality.     Impression: Impression: Minimal patchy opacities in the peripheral lung bases bilaterally most likely represents atelectasis, although pulmonary infiltrates not completely excluded. Electronically Signed: Enrike Walters DO  12/12/2023 7:52 PM EST  Workstation ID: [id number]     Results for orders placed during the hospital encounter of 12/12/23    Adult Transthoracic Echocardiogram Complete    Interpretation Summary    Left ventricular systolic function is normal. Calculated left ventricular EF of 64%.  There is hypokinesis of the anteroseptum, inferoseptum, and apex    Left ventricular diastolic function was normal.    Normal right ventricular size and systolic function    No significant valvular abnormality    No pericardial effusion    No LV thrombus on contrasted images    No prior echocardiogram for comparison      Current medications:  Scheduled Meds:aspirin, 81 mg, Oral, Daily  [START ON 12/15/2023] ceFAZolin, 2,000 mg, Intravenous, Once  chlorhexidine, 15 mL, Mouth/Throat, Q12H  famotidine, 10 mg, Oral, Daily  insulin detemir, 5 Units, Subcutaneous, Nightly  insulin lispro, 2-9 Units, Subcutaneous, 4x Daily AC & at Bedtime  levothyroxine, 137 mcg, Oral, Q AM  metoprolol succinate XL, 50 mg, Oral, Q24H  montelukast, 10 mg, Oral, Nightly  mupirocin, 1 application , Each Nare, Q12H  pantoprazole, 40 mg, Intravenous, Q AM  pharmacy consult - MTM, , Does not apply, Daily  senna-docusate sodium, 2 tablet, Oral, BID  sodium chloride, 10 mL, Intravenous, Q12H  sodium chloride, 10 mL, Intravenous, Q12H  tamsulosin, 0.4 mg, Oral, Daily      Continuous  Infusions:sodium chloride, 100 mL/hr, Last Rate: 100 mL/hr (12/14/23 0016)      PRN Meds:.  senna-docusate sodium **AND** polyethylene glycol **AND** bisacodyl **AND** bisacodyl    Chlorhexidine Gluconate Cloth    dextrose    dextrose    glucagon (human recombinant)    ipratropium-albuterol    sodium chloride    sodium chloride    sodium chloride    sodium chloride    [START ON 12/15/2023] sodium chloride    Assessment & Plan   Assessment & Plan     Active Hospital Problems    Diagnosis  POA    **NSTEMI (non-ST elevated myocardial infarction) [I21.4]  Yes    Nocturnal hypoxia [G47.34]  Yes    HLD (hyperlipidemia) [E78.5]  Yes    CAD (coronary artery disease) [I25.10]  Yes    Hypothyroidism (acquired) [E03.9]  Yes    T2DM (type 2 diabetes mellitus) [E11.9]  Yes    NSTEMI, initial episode of care [I21.4]  Unknown    HTN (hypertension) [I10]  Yes      Resolved Hospital Problems   No resolved problems to display.        Brief Hospital Course to date:  Joni Hoffman is a 78 y.o. male w/ hx insulin dep dm2, cad w/ previous stent placed remotely who presented with recent onset of exertional chest pain. Troponin was markedly elevated. Given plavix load, asa, heparin drip and admitted to hospitalist service. Cardiology consulted. LHC was performed 12/13/23 which revealed triple vessel cad and CT surgery was consulted regarding evaluation for cabg.    Triple vessel CAD  NSTEMI  HTN  HL  -s/p plavix load on 12/12 evening  -LHC 12/13/23 by Dr. Mena: severe triple vessel cad  -echo 12/13/23: LV EF 64%, valves ok  -cards follows (Dr. Mcmanus): continue asa, toprol. On repatha at home.   -CT surgery consulted:  Holding plavix. Tentatively planning CABG tomorrow Friday 12/15/23    Insulin dep DM2 (A1c 7.0)  -levemir 5 units, sliding scale humalog. Titrate prn    Hypothyroidism  -tsh ok  -continue levothyroxine        Expected Discharge Location and Transportation: home  Expected Discharge ? 12/19/23 depending on post CABG  course    DVT prophylaxis:  No DVT prophylaxis order currently exists.     AM-PAC 6 Clicks Score (PT): 24 (12/14/23 1300)    CODE STATUS:   Code Status and Medical Interventions:   Ordered at: 12/13/23 0025     Code Status (Patient has no pulse and is not breathing):    CPR (Attempt to Resuscitate)     Medical Interventions (Patient has pulse or is breathing):    Full Support       Joe Zabala MD  12/14/23

## 2023-12-14 NOTE — CASE MANAGEMENT/SOCIAL WORK
Continued Stay Note  [facility]     Patient Name: Joni Hoffman  MRN: [id number]  Today's Date: 12/14/2023    Admit Date: 12/12/2023    Plan: Home with spouse   Discharge Plan       Row Name 12/14/23 0826       Plan    Plan Home with spouse    Patient/Family in Agreement with Plan yes    Plan Comments Spoke with patient and spouse at bedside. Plan is home with spouse. Spouse will transport. Patient denies any discharge needs at this tome. CM will continue to follow.    Final Discharge Disposition Code 01 - home or self-care                   Discharge Codes    No documentation.                 Expected Discharge Date and Time       Expected Discharge Date Expected Discharge Time    Dec 14, 2023               Van Car RN

## 2023-12-14 NOTE — PROGRESS NOTES
[facility] Cardiothoracic Surgery In-Patient Progress Note         LOS: 1 day     Chief Complaint: Coronary artery disease    Subjective  Denies chest pain or shortness of breath.      Objective  Vital Signs  Temp:  [97.3 °F (36.3 °C)-99.7 °F (37.6 °C)] 97.6 °F (36.4 °C)  Heart Rate:  [69-96] 70  Resp:  [18] 18  BP: (118-173)/(63-89) 140/78        Physical Exam:   General Appearance: alert, appears stated age and cooperative   Lungs: clear to auscultation, respirations regular, respirations even, and respirations unlabored   Heart: regular rhythm & normal rate, normal S1, S2, no murmur, no gallop, no rub, and no click     Results     Results from last 7 days   Lab Units 12/14/23  0550   WBC 10*3/mm3 5.75   HEMOGLOBIN g/dL 14.0   HEMATOCRIT % 42.5   PLATELETS 10*3/mm3 165     Results from last 7 days   Lab Units 12/14/23  0550   SODIUM mmol/L 140   POTASSIUM mmol/L 4.1   CHLORIDE mmol/L 105   CO2 mmol/L 26.0   BUN mg/dL 18   CREATININE mg/dL 1.06   GLUCOSE mg/dL 146*   CALCIUM mg/dL 8.6       Assessment    NSTEMI (non-ST elevated myocardial infarction)    HTN (hypertension)    CAD (coronary artery disease)    Hypothyroidism (acquired)    T2DM (type 2 diabetes mellitus)    Nocturnal hypoxia    HLD (hyperlipidemia)    NSTEMI, initial episode of care      Plan   Preop for CABG with EV tomorrow w/ Dr. Darnell  NPO after midnight      Rachel Lambert, APRN  12/14/23  07:33 EST

## 2023-12-14 NOTE — STS RISK SCORE
Procedure Type: Isolated CABG  Perioperative Outcome Estimate %  Operative Mortality 1.59%  Morbidity & Mortality 6.48%  Stroke 0.884%  Renal Failure 1.37%  Reoperation 1.69%  Prolonged Ventilation 3.5%  Deep Sternal Wound Infection 0.325%  Long Hospital Stay (>14 days) 3.71%  Short Hospital Stay (<6 days)* 42.1%

## 2023-12-14 NOTE — PROGRESS NOTES
" [facility] - Progress Note    Joni Hoffman  1945  [id number]/1    12/14/23, 08:01 EST      Chief Complaint: Following for NSTEMI    Subjective:   S/P C 12/13 -- MV CAD.  CTS following, possible CABG tomorrow.  Awake, sitting up on bedside eating bfast.  Wife present.  Denies any current chest pain/angina, denies dyspnea.  Appetite is good.  BM yesterday.  Has been up walking in room without symptoms.      Review of Systems:  Pertinent positives are listed above and in physical exam.  All others have been reviewed and are negative.    aspirin, 81 mg, Oral, Daily  [START ON 12/15/2023] ceFAZolin, 2,000 mg, Intravenous, Once  chlorhexidine, 15 mL, Mouth/Throat, Q12H  famotidine, 10 mg, Oral, Daily  insulin detemir, 5 Units, Subcutaneous, Nightly  insulin lispro, 2-9 Units, Subcutaneous, 4x Daily AC & at Bedtime  levothyroxine, 137 mcg, Oral, Q AM  montelukast, 10 mg, Oral, Nightly  mupirocin, 1 application , Each Nare, Q12H  pantoprazole, 40 mg, Intravenous, Q AM  pharmacy consult - MTM, , Does not apply, Daily  senna-docusate sodium, 2 tablet, Oral, BID  sodium chloride, 10 mL, Intravenous, Q12H  sodium chloride, 10 mL, Intravenous, Q12H  tamsulosin, 0.4 mg, Oral, Daily        Objective:  Vitals:   height is 167.6 cm (65.98\") and weight is 93 kg (205 lb 0.4 oz). His oral temperature is 97.8 °F (36.6 °C). His blood pressure is 176/80 and his pulse is 76. His respiration is 18 and oxygen saturation is 93%.     Intake/Output Summary (Last 24 hours) at 12/14/2023 0801  Last data filed at 12/13/2023 1700  Gross per 24 hour   Intake 480 ml   Output --   Net 480 ml       Physical Exam:  General:  WN, NAD, A and O x3.  CV:  Normal S1,S2. No murmur, rub, or gallop.  Resp:  CTA Behzad, equal, nonlabored.  Abd:  Soft, + BS, no organomegaly. Nontender to palpation.  Extrem:  No edema BLE, 2+ pedal/PT pulses.            Results from last 7 days   Lab Units 12/14/23  0550   WBC 10*3/mm3 5.75   HEMOGLOBIN g/dL " 14.0   HEMATOCRIT % 42.5   PLATELETS 10*3/mm3 165     Results from last 7 days   Lab Units 12/14/23  0550 12/13/23  0331 12/12/23  1920   SODIUM mmol/L 140   < > 142   POTASSIUM mmol/L 4.1   < > 4.4   CHLORIDE mmol/L 105   < > 102   CO2 mmol/L 26.0   < > 29.0   BUN mg/dL 18   < > 24*   CREATININE mg/dL 1.06   < > 1.25   CALCIUM mg/dL 8.6   < > 9.5   BILIRUBIN mg/dL  --   --  0.2   ALK PHOS U/L  --   --  72   ALT (SGPT) U/L  --   --  18   AST (SGOT) U/L  --   --  20   GLUCOSE mg/dL 146*   < > 188*    < > = values in this interval not displayed.     Results from last 7 days   Lab Units 12/12/23 1920   INR  0.93     Results from last 7 days   Lab Units 12/13/23  0331   TSH uIU/mL 2.640     Results from last 7 days   Lab Units 12/13/23  0331   CHOLESTEROL mg/dL 103   TRIGLYCERIDES mg/dL 122   HDL CHOL mg/dL 34*   LDL CHOL mg/dL 47     Results from last 7 days   Lab Units 12/12/23 1920   PROBNP pg/mL 733.6       Tele:  NSR      Assessment:  -78-year-old CM with past medical history of CAD, HTN, HLP, DM 2 presents to local hospital after 2 to 3-day history of exertional chest pain and dyspnea with normal EKG, elevated cardiac markers.  Repeat serial troponins here at [facility] are also elevated, findings consistent with NSTEMI. MV CAD by [facility] 12/13/23.  -CAD, remote stenting  -HTN  -HLP  -DM 2, A1C  7  -Hypothyroidism on chronic supplementation  -BPH           Plan:  -Admitted to hospitalist services.    - Plan for possible CABG tomorrow, Dr. Darnell.  CTS following.  -Continue low-dose aspirin.  He is on Repatha at home.  Patient did receive loading dose Plavix 300 mg  -  Add Toprol XL 50mg PO daily.  Hold off on ACE/ARB/ARNI pre operative and will follow closely post operatively.  -Continue Synthroid      I discussed the patient's findings and my recommendations with the patient, any present family members, and the nursing staff.  Magan Mcmanus MD saw and examined patient, verified hx and PE, read all radiographic  studies, reviewed labs and micro data, and formulated dx, plan for treatment and all medical decision making.      Jennifer Granger PA-C  12/14/23, 08:01 EST

## 2023-12-14 NOTE — PLAN OF CARE
Problem: Adult Inpatient Plan of Care  Goal: Plan of Care Review  Outcome: Ongoing, Progressing  Flowsheets  Taken 12/14/2023 0635 by Shae Bran RN  Plan of Care Reviewed With: patient  Taken 12/13/2023 0101 by Keyanna Lehman RN  Progress: no change     Problem: Adult Inpatient Plan of Care  Goal: Optimal Comfort and Wellbeing  Outcome: Ongoing, Progressing  Intervention: Provide Person-Centered Care  Recent Flowsheet Documentation  Taken 12/13/2023 2110 by Shae Bran RN  Trust Relationship/Rapport:   care explained   questions answered     Problem: Fall Injury Risk  Goal: Absence of Fall and Fall-Related Injury  Outcome: Ongoing, Progressing  Intervention: Identify and Manage Contributors  Recent Flowsheet Documentation  Taken 12/14/2023 0623 by Shae Bran RN  Medication Review/Management: medications reviewed  Taken 12/14/2023 0434 by Shae Bran RN  Medication Review/Management: medications reviewed  Taken 12/14/2023 0245 by Shae Bran RN  Medication Review/Management: medications reviewed  Taken 12/14/2023 0119 by Shae Barn RN  Medication Review/Management: medications reviewed  Taken 12/13/2023 2230 by Shae Bran RN  Medication Review/Management: medications reviewed  Taken 12/13/2023 2110 by Shae Bran, RN  Medication Review/Management: medications reviewed  Intervention: Promote Injury-Free Environment  Recent Flowsheet Documentation  Taken 12/14/2023 0623 by Shae Bran, PANDA  Safety Promotion/Fall Prevention:   fall prevention program maintained   safety round/check completed  Taken 12/14/2023 0434 by Shae Bran, PANDA  Safety Promotion/Fall Prevention:   fall prevention program maintained   safety round/check completed  Taken 12/14/2023 0245 by Shae Bran, RN  Safety Promotion/Fall Prevention:   fall prevention program maintained   safety round/check completed  Taken 12/14/2023 0119 by Shae Bran, RN  Safety Promotion/Fall Prevention:   fall prevention program maintained    safety round/check completed  Taken 12/13/2023 2230 by Shae Bran, RN  Safety Promotion/Fall Prevention:   fall prevention program maintained   safety round/check completed  Taken 12/13/2023 2110 by Shae Bran, RN  Safety Promotion/Fall Prevention:   fall prevention program maintained   safety round/check completed   Goal Outcome Evaluation:  Plan of Care Reviewed With: patient

## 2023-12-15 ENCOUNTER — ANESTHESIA EVENT CONVERTED (OUTPATIENT)
Dept: ANESTHESIOLOGY | Facility: HOSPITAL | Age: 78
End: 2023-12-15
Payer: MEDICARE

## 2023-12-15 ENCOUNTER — ANESTHESIA (OUTPATIENT)
Dept: PERIOP | Facility: HOSPITAL | Age: 78
End: 2023-12-15
Payer: MEDICARE

## 2023-12-15 ENCOUNTER — APPOINTMENT (OUTPATIENT)
Dept: GENERAL RADIOLOGY | Facility: HOSPITAL | Age: 78
End: 2023-12-15
Payer: MEDICARE

## 2023-12-15 PROBLEM — I21.4 NSTEMI, INITIAL EPISODE OF CARE: Status: RESOLVED | Noted: 2023-12-12 | Resolved: 2023-12-15

## 2023-12-15 LAB
ACT BLD: 125 SECONDS (ref 82–152)
ALBUMIN SERPL-MCNC: 3.9 G/DL (ref 3.5–5.2)
ALBUMIN SERPL-MCNC: 4.4 G/DL (ref 3.5–5.2)
AMPHET+METHAMPHET UR QL: NEGATIVE
AMPHETAMINES UR QL: NEGATIVE
ANION GAP SERPL CALCULATED.3IONS-SCNC: 10 MMOL/L (ref 5–15)
ANION GAP SERPL CALCULATED.3IONS-SCNC: 12 MMOL/L (ref 5–15)
APTT PPP: 30.7 SECONDS (ref 22–39)
ARTERIAL PATENCY WRIST A: ABNORMAL
ARTERIAL PATENCY WRIST A: ABNORMAL
ATMOSPHERIC PRESS: ABNORMAL MM[HG]
ATMOSPHERIC PRESS: ABNORMAL MM[HG]
BARBITURATES UR QL SCN: NEGATIVE
BASE EXCESS BLDA CALC-SCNC: -1.1 MMOL/L (ref 0–2)
BASE EXCESS BLDA CALC-SCNC: -1.2 MMOL/L (ref 0–2)
BDY SITE: ABNORMAL
BDY SITE: ABNORMAL
BENZODIAZ UR QL SCN: NEGATIVE
BODY TEMPERATURE: 37
BODY TEMPERATURE: 37
BUN SERPL-MCNC: 15 MG/DL (ref 8–23)
BUN SERPL-MCNC: 15 MG/DL (ref 8–23)
BUN/CREAT SERPL: 12 (ref 7–25)
BUN/CREAT SERPL: 12.6 (ref 7–25)
BUPRENORPHINE SERPL-MCNC: NEGATIVE NG/ML
CA-I SERPL ISE-MCNC: 1.28 MMOL/L (ref 1.12–1.32)
CALCIUM SPEC-SCNC: 8.8 MG/DL (ref 8.6–10.5)
CALCIUM SPEC-SCNC: 8.8 MG/DL (ref 8.6–10.5)
CANNABINOIDS SERPL QL: NEGATIVE
CHLORIDE SERPL-SCNC: 107 MMOL/L (ref 98–107)
CHLORIDE SERPL-SCNC: 108 MMOL/L (ref 98–107)
CO2 BLDA-SCNC: 25.2 MMOL/L (ref 22–33)
CO2 BLDA-SCNC: 26 MMOL/L (ref 22–33)
CO2 SERPL-SCNC: 24 MMOL/L (ref 22–29)
CO2 SERPL-SCNC: 25 MMOL/L (ref 22–29)
COCAINE UR QL: NEGATIVE
COHGB MFR BLD: 1.1 % (ref 0–2)
COHGB MFR BLD: 1.4 % (ref 0–2)
COTININE UR QL SCN: POSITIVE NG/ML
CREAT SERPL-MCNC: 1.19 MG/DL (ref 0.76–1.27)
CREAT SERPL-MCNC: 1.25 MG/DL (ref 0.76–1.27)
DEPRECATED RDW RBC AUTO: 44.1 FL (ref 37–54)
DEPRECATED RDW RBC AUTO: 44.5 FL (ref 37–54)
EGFRCR SERPLBLD CKD-EPI 2021: 58.9 ML/MIN/1.73
EGFRCR SERPLBLD CKD-EPI 2021: 62.5 ML/MIN/1.73
EPAP: 0
EPAP: 0
ERYTHROCYTE [DISTWIDTH] IN BLOOD BY AUTOMATED COUNT: 12.8 % (ref 12.3–15.4)
ERYTHROCYTE [DISTWIDTH] IN BLOOD BY AUTOMATED COUNT: 13 % (ref 12.3–15.4)
FENTANYL UR-MCNC: NEGATIVE NG/ML
FIBRINOGEN PPP-MCNC: 291 MG/DL (ref 203–470)
FSP PPP LA-ACNC: NORMAL
GLUCOSE BLDC GLUCOMTR-MCNC: 142 MG/DL (ref 70–130)
GLUCOSE BLDC GLUCOMTR-MCNC: 148 MG/DL (ref 70–130)
GLUCOSE BLDC GLUCOMTR-MCNC: 149 MG/DL (ref 70–130)
GLUCOSE BLDC GLUCOMTR-MCNC: 158 MG/DL (ref 70–130)
GLUCOSE BLDC GLUCOMTR-MCNC: 162 MG/DL (ref 70–130)
GLUCOSE BLDC GLUCOMTR-MCNC: 170 MG/DL (ref 70–130)
GLUCOSE BLDC GLUCOMTR-MCNC: 171 MG/DL (ref 70–130)
GLUCOSE BLDC GLUCOMTR-MCNC: 181 MG/DL (ref 70–130)
GLUCOSE BLDC GLUCOMTR-MCNC: 185 MG/DL (ref 70–130)
GLUCOSE BLDC GLUCOMTR-MCNC: 188 MG/DL (ref 70–130)
GLUCOSE SERPL-MCNC: 137 MG/DL (ref 65–99)
GLUCOSE SERPL-MCNC: 181 MG/DL (ref 65–99)
HCO3 BLDA-SCNC: 23.9 MMOL/L (ref 20–26)
HCO3 BLDA-SCNC: 24.6 MMOL/L (ref 20–26)
HCT VFR BLD AUTO: 28.7 % (ref 37.5–51)
HCT VFR BLD AUTO: 29.4 % (ref 37.5–51)
HCT VFR BLD AUTO: 31.8 % (ref 37.5–51)
HCT VFR BLD CALC: 29.6 % (ref 38–51)
HCT VFR BLD CALC: 33.1 % (ref 38–51)
HGB BLD-MCNC: 10.5 G/DL (ref 13–17.7)
HGB BLD-MCNC: 9.4 G/DL (ref 13–17.7)
HGB BLD-MCNC: 9.6 G/DL (ref 13–17.7)
HGB BLDA-MCNC: 10.8 G/DL (ref 13.5–17.5)
HGB BLDA-MCNC: 9.7 G/DL (ref 13.5–17.5)
INHALED O2 CONCENTRATION: 21 %
INHALED O2 CONCENTRATION: 40 %
INR PPP: 1.19 (ref 0.89–1.12)
IPAP: 0
IPAP: 0
Lab: ABNORMAL
MAGNESIUM SERPL-MCNC: 2 MG/DL (ref 1.6–2.4)
MAGNESIUM SERPL-MCNC: 2.3 MG/DL (ref 1.6–2.4)
MCH RBC QN AUTO: 31.2 PG (ref 26.6–33)
MCH RBC QN AUTO: 31.3 PG (ref 26.6–33)
MCHC RBC AUTO-ENTMCNC: 32.8 G/DL (ref 31.5–35.7)
MCHC RBC AUTO-ENTMCNC: 33 G/DL (ref 31.5–35.7)
MCV RBC AUTO: 94.9 FL (ref 79–97)
MCV RBC AUTO: 95.3 FL (ref 79–97)
METHADONE UR QL SCN: NEGATIVE
METHGB BLD QL: 0.3 % (ref 0–1.5)
METHGB BLD QL: 0.4 % (ref 0–1.5)
MODALITY: ABNORMAL
MODALITY: ABNORMAL
OPIATES UR QL: NEGATIVE
OXYCODONE UR QL SCN: NEGATIVE
OXYHGB MFR BLDV: 93.6 % (ref 94–99)
OXYHGB MFR BLDV: 98.3 % (ref 94–99)
PA ADP PRP-ACNC: 170 PRU
PAW @ PEAK INSP FLOW SETTING VENT: 0 CMH2O
PAW @ PEAK INSP FLOW SETTING VENT: 0 CMH2O
PCO2 BLDA: 40.8 MM HG (ref 35–45)
PCO2 BLDA: 44.8 MM HG (ref 35–45)
PCO2 TEMP ADJ BLD: 40.8 MM HG (ref 35–48)
PCO2 TEMP ADJ BLD: 44.8 MM HG (ref 35–48)
PCP UR QL SCN: NEGATIVE
PEEP RESPIRATORY: 5 CM[H2O]
PEEP RESPIRATORY: 5 CM[H2O]
PH BLDA: 7.35 PH UNITS (ref 7.35–7.45)
PH BLDA: 7.38 PH UNITS (ref 7.35–7.45)
PH, TEMP CORRECTED: 7.35 PH UNITS
PH, TEMP CORRECTED: 7.38 PH UNITS
PHOSPHATE SERPL-MCNC: 2.7 MG/DL (ref 2.5–4.5)
PHOSPHATE SERPL-MCNC: 2.9 MG/DL (ref 2.5–4.5)
PLATELET # BLD AUTO: 174 10*3/MM3 (ref 140–450)
PLATELET # BLD AUTO: 176 10*3/MM3 (ref 140–450)
PMV BLD AUTO: 9.8 FL (ref 6–12)
PMV BLD AUTO: 9.8 FL (ref 6–12)
PO2 BLDA: 171 MM HG (ref 83–108)
PO2 BLDA: 73.7 MM HG (ref 83–108)
PO2 TEMP ADJ BLD: 171 MM HG (ref 83–108)
PO2 TEMP ADJ BLD: 73.7 MM HG (ref 83–108)
POTASSIUM SERPL-SCNC: 3.5 MMOL/L (ref 3.5–5.2)
POTASSIUM SERPL-SCNC: 4 MMOL/L (ref 3.5–5.2)
PROTHROMBIN TIME: 15.2 SECONDS (ref 12.2–14.5)
PSV: 10 CMH2O
PSV: 10 CMH2O
QT INTERVAL: 364 MS
QT INTERVAL: 406 MS
QTC INTERVAL: 404 MS
QTC INTERVAL: 422 MS
RBC # BLD AUTO: 3.01 10*6/MM3 (ref 4.14–5.8)
RBC # BLD AUTO: 3.35 10*6/MM3 (ref 4.14–5.8)
SODIUM SERPL-SCNC: 143 MMOL/L (ref 136–145)
SODIUM SERPL-SCNC: 143 MMOL/L (ref 136–145)
TOTAL RATE: 0 BREATHS/MINUTE
TOTAL RATE: 14 BREATHS/MINUTE
TRICYCLICS UR QL SCN: NEGATIVE
VENTILATOR MODE: ABNORMAL
VT ON VENT VENT: 0.51 ML
WBC NRBC COR # BLD AUTO: 13.92 10*3/MM3 (ref 3.4–10.8)
WBC NRBC COR # BLD AUTO: 14.03 10*3/MM3 (ref 3.4–10.8)

## 2023-12-15 PROCEDURE — 25010000002 MIDAZOLAM PER 1 MG: Performed by: ANESTHESIOLOGY

## 2023-12-15 PROCEDURE — 85018 HEMOGLOBIN: CPT | Performed by: THORACIC SURGERY (CARDIOTHORACIC VASCULAR SURGERY)

## 2023-12-15 PROCEDURE — 25010000002 CEFAZOLIN PER 500 MG: Performed by: PHYSICIAN ASSISTANT

## 2023-12-15 PROCEDURE — 25010000002 GENTAMICIN PER 80 MG: Performed by: THORACIC SURGERY (CARDIOTHORACIC VASCULAR SURGERY)

## 2023-12-15 PROCEDURE — 02100Z9 BYPASS CORONARY ARTERY, ONE ARTERY FROM LEFT INTERNAL MAMMARY, OPEN APPROACH: ICD-10-PCS | Performed by: THORACIC SURGERY (CARDIOTHORACIC VASCULAR SURGERY)

## 2023-12-15 PROCEDURE — 82947 ASSAY GLUCOSE BLOOD QUANT: CPT

## 2023-12-15 PROCEDURE — 25010000002 CEFAZOLIN PER 500 MG: Performed by: ANESTHESIOLOGY

## 2023-12-15 PROCEDURE — P9041 ALBUMIN (HUMAN),5%, 50ML: HCPCS | Performed by: PHYSICIAN ASSISTANT

## 2023-12-15 PROCEDURE — 85610 PROTHROMBIN TIME: CPT | Performed by: PHYSICIAN ASSISTANT

## 2023-12-15 PROCEDURE — 33533 CABG ARTERIAL SINGLE: CPT | Performed by: THORACIC SURGERY (CARDIOTHORACIC VASCULAR SURGERY)

## 2023-12-15 PROCEDURE — 82948 REAGENT STRIP/BLOOD GLUCOSE: CPT

## 2023-12-15 PROCEDURE — 36430 TRANSFUSION BLD/BLD COMPNT: CPT

## 2023-12-15 PROCEDURE — 85730 THROMBOPLASTIN TIME PARTIAL: CPT | Performed by: PHYSICIAN ASSISTANT

## 2023-12-15 PROCEDURE — 25010000002 POTASSIUM CHLORIDE PER 2 MEQ: Performed by: THORACIC SURGERY (CARDIOTHORACIC VASCULAR SURGERY)

## 2023-12-15 PROCEDURE — 25010000002 PROTAMINE SULFATE PER 10 MG: Performed by: ANESTHESIOLOGY

## 2023-12-15 PROCEDURE — 25010000002 CEFAZOLIN PER 500 MG: Performed by: THORACIC SURGERY (CARDIOTHORACIC VASCULAR SURGERY)

## 2023-12-15 PROCEDURE — 25010000002 ALBUMIN HUMAN 5% PER 50 ML: Performed by: PHYSICIAN ASSISTANT

## 2023-12-15 PROCEDURE — P9100 PATHOGEN TEST FOR PLATELETS: HCPCS

## 2023-12-15 PROCEDURE — 33518 CABG ARTERY-VEIN TWO: CPT | Performed by: PHYSICIAN ASSISTANT

## 2023-12-15 PROCEDURE — 25810000003 DEXTROSE 5 % WITH KCL 20 MEQ 20 MEQ/L SOLUTION: Performed by: PHYSICIAN ASSISTANT

## 2023-12-15 PROCEDURE — P9035 PLATELET PHERES LEUKOREDUCED: HCPCS

## 2023-12-15 PROCEDURE — 71045 X-RAY EXAM CHEST 1 VIEW: CPT

## 2023-12-15 PROCEDURE — 25010000002 ACETAMINOPHEN 10 MG/ML SOLUTION: Performed by: PHYSICIAN ASSISTANT

## 2023-12-15 PROCEDURE — P9041 ALBUMIN (HUMAN),5%, 50ML: HCPCS | Performed by: ANESTHESIOLOGY

## 2023-12-15 PROCEDURE — 85347 COAGULATION TIME ACTIVATED: CPT

## 2023-12-15 PROCEDURE — 94002 VENT MGMT INPAT INIT DAY: CPT

## 2023-12-15 PROCEDURE — 5A1221Z PERFORMANCE OF CARDIAC OUTPUT, CONTINUOUS: ICD-10-PCS | Performed by: THORACIC SURGERY (CARDIOTHORACIC VASCULAR SURGERY)

## 2023-12-15 PROCEDURE — 94799 UNLISTED PULMONARY SVC/PX: CPT

## 2023-12-15 PROCEDURE — 93005 ELECTROCARDIOGRAM TRACING: CPT | Performed by: PHYSICIAN ASSISTANT

## 2023-12-15 PROCEDURE — 25010000002 HEPARIN (PORCINE) PER 1000 UNITS: Performed by: ANESTHESIOLOGY

## 2023-12-15 PROCEDURE — 82803 BLOOD GASES ANY COMBINATION: CPT

## 2023-12-15 PROCEDURE — 82805 BLOOD GASES W/O2 SATURATION: CPT

## 2023-12-15 PROCEDURE — 85362 FIBRIN DEGRADATION PRODUCTS: CPT | Performed by: THORACIC SURGERY (CARDIOTHORACIC VASCULAR SURGERY)

## 2023-12-15 PROCEDURE — 06BP4ZZ EXCISION OF RIGHT SAPHENOUS VEIN, PERCUTANEOUS ENDOSCOPIC APPROACH: ICD-10-PCS | Performed by: THORACIC SURGERY (CARDIOTHORACIC VASCULAR SURGERY)

## 2023-12-15 PROCEDURE — 35189 RPR ACQ AV FISTULA THRX&ABD: CPT | Performed by: PHYSICIAN ASSISTANT

## 2023-12-15 PROCEDURE — 25010000002 ANTI-INHIBITOR COAGULANT COMPLEX 1000 UNITS RECONSTITUTED SOLUTION: Performed by: THORACIC SURGERY (CARDIOTHORACIC VASCULAR SURGERY)

## 2023-12-15 PROCEDURE — 33518 CABG ARTERY-VEIN TWO: CPT | Performed by: THORACIC SURGERY (CARDIOTHORACIC VASCULAR SURGERY)

## 2023-12-15 PROCEDURE — 25010000002 PROTAMINE SULFATE PER 10 MG: Performed by: PHYSICIAN ASSISTANT

## 2023-12-15 PROCEDURE — 25010000002 FENTANYL CITRATE (PF) 250 MCG/5ML SOLUTION: Performed by: ANESTHESIOLOGY

## 2023-12-15 PROCEDURE — C1751 CATH, INF, PER/CENT/MIDLINE: HCPCS | Performed by: ANESTHESIOLOGY

## 2023-12-15 PROCEDURE — 85384 FIBRINOGEN ACTIVITY: CPT | Performed by: THORACIC SURGERY (CARDIOTHORACIC VASCULAR SURGERY)

## 2023-12-15 PROCEDURE — 80069 RENAL FUNCTION PANEL: CPT | Performed by: PHYSICIAN ASSISTANT

## 2023-12-15 PROCEDURE — 25010000002 ONDANSETRON PER 1 MG: Performed by: PHYSICIAN ASSISTANT

## 2023-12-15 PROCEDURE — 33508 ENDOSCOPIC VEIN HARVEST: CPT | Performed by: THORACIC SURGERY (CARDIOTHORACIC VASCULAR SURGERY)

## 2023-12-15 PROCEDURE — 25010000002 MORPHINE PER 10 MG: Performed by: THORACIC SURGERY (CARDIOTHORACIC VASCULAR SURGERY)

## 2023-12-15 PROCEDURE — 25810000003 LACTATED RINGERS PER 1000 ML: Performed by: ANESTHESIOLOGY

## 2023-12-15 PROCEDURE — 25810000003 SODIUM CHLORIDE PER 500 ML: Performed by: THORACIC SURGERY (CARDIOTHORACIC VASCULAR SURGERY)

## 2023-12-15 PROCEDURE — 93010 ELECTROCARDIOGRAM REPORT: CPT | Performed by: INTERNAL MEDICINE

## 2023-12-15 PROCEDURE — 25010000002 ACETAMINOPHEN 10 MG/ML SOLUTION: Performed by: ANESTHESIOLOGY

## 2023-12-15 PROCEDURE — 35189 RPR ACQ AV FISTULA THRX&ABD: CPT | Performed by: THORACIC SURGERY (CARDIOTHORACIC VASCULAR SURGERY)

## 2023-12-15 PROCEDURE — 25010000002 NITROGLYCERIN 100-5 MCG/ML-% SOLUTION: Performed by: ANESTHESIOLOGY

## 2023-12-15 PROCEDURE — 25010000002 PROPOFOL 10 MG/ML EMULSION: Performed by: ANESTHESIOLOGY

## 2023-12-15 PROCEDURE — 85014 HEMATOCRIT: CPT

## 2023-12-15 PROCEDURE — 99024 POSTOP FOLLOW-UP VISIT: CPT | Performed by: THORACIC SURGERY (CARDIOTHORACIC VASCULAR SURGERY)

## 2023-12-15 PROCEDURE — 84132 ASSAY OF SERUM POTASSIUM: CPT

## 2023-12-15 PROCEDURE — 25010000002 FENTANYL CITRATE (PF) 50 MCG/ML SOLUTION: Performed by: THORACIC SURGERY (CARDIOTHORACIC VASCULAR SURGERY)

## 2023-12-15 PROCEDURE — 0 DEXTROSE 5 % SOLUTION 250 ML FLEX CONT: Performed by: ANESTHESIOLOGY

## 2023-12-15 PROCEDURE — 82375 ASSAY CARBOXYHB QUANT: CPT

## 2023-12-15 PROCEDURE — 83735 ASSAY OF MAGNESIUM: CPT | Performed by: PHYSICIAN ASSISTANT

## 2023-12-15 PROCEDURE — 33533 CABG ARTERIAL SINGLE: CPT | Performed by: PHYSICIAN ASSISTANT

## 2023-12-15 PROCEDURE — 25010000002 AMIODARONE IN DEXTROSE 5% 360-4.14 MG/200ML-% SOLUTION: Performed by: THORACIC SURGERY (CARDIOTHORACIC VASCULAR SURGERY)

## 2023-12-15 PROCEDURE — 33508 ENDOSCOPIC VEIN HARVEST: CPT | Performed by: PHYSICIAN ASSISTANT

## 2023-12-15 PROCEDURE — 25010000002 PAPAVERINE PER 60 MG: Performed by: THORACIC SURGERY (CARDIOTHORACIC VASCULAR SURGERY)

## 2023-12-15 PROCEDURE — 25010000002 ALBUMIN HUMAN 5% PER 50 ML: Performed by: ANESTHESIOLOGY

## 2023-12-15 PROCEDURE — 25010000002 HEPARIN (PORCINE) PER 1000 UNITS: Performed by: THORACIC SURGERY (CARDIOTHORACIC VASCULAR SURGERY)

## 2023-12-15 PROCEDURE — 85027 COMPLETE CBC AUTOMATED: CPT | Performed by: PHYSICIAN ASSISTANT

## 2023-12-15 PROCEDURE — 25010000002 AMIODARONE IN DEXTROSE 5% 360-4.14 MG/200ML-% SOLUTION: Performed by: ANESTHESIOLOGY

## 2023-12-15 PROCEDURE — 82330 ASSAY OF CALCIUM: CPT | Performed by: PHYSICIAN ASSISTANT

## 2023-12-15 PROCEDURE — 85576 BLOOD PLATELET AGGREGATION: CPT | Performed by: PHYSICIAN ASSISTANT

## 2023-12-15 PROCEDURE — 85014 HEMATOCRIT: CPT | Performed by: THORACIC SURGERY (CARDIOTHORACIC VASCULAR SURGERY)

## 2023-12-15 PROCEDURE — C1894 INTRO/SHEATH, NON-LASER: HCPCS | Performed by: THORACIC SURGERY (CARDIOTHORACIC VASCULAR SURGERY)

## 2023-12-15 PROCEDURE — 83050 HGB METHEMOGLOBIN QUAN: CPT

## 2023-12-15 PROCEDURE — 82330 ASSAY OF CALCIUM: CPT

## 2023-12-15 PROCEDURE — 021109W BYPASS CORONARY ARTERY, TWO ARTERIES FROM AORTA WITH AUTOLOGOUS VENOUS TISSUE, OPEN APPROACH: ICD-10-PCS | Performed by: THORACIC SURGERY (CARDIOTHORACIC VASCULAR SURGERY)

## 2023-12-15 PROCEDURE — 84295 ASSAY OF SERUM SODIUM: CPT

## 2023-12-15 DEVICE — LIGACLIP MCA MULTIPLE CLIP APPLIERS, 20 SMALL CLIPS
Type: IMPLANTABLE DEVICE | Site: LEG | Status: FUNCTIONAL
Brand: LIGACLIP

## 2023-12-15 DEVICE — STAPLER WITH DST SERIES TECHNOLOGY
Type: IMPLANTABLE DEVICE | Site: LEG | Status: FUNCTIONAL
Brand: TA

## 2023-12-15 RX ORDER — ALBUMIN, HUMAN INJ 5% 5 %
SOLUTION INTRAVENOUS CONTINUOUS PRN
Status: DISCONTINUED | OUTPATIENT
Start: 2023-12-15 | End: 2023-12-15 | Stop reason: SURG

## 2023-12-15 RX ORDER — FENTANYL CITRATE 50 UG/ML
INJECTION, SOLUTION INTRAMUSCULAR; INTRAVENOUS AS NEEDED
Status: DISCONTINUED | OUTPATIENT
Start: 2023-12-15 | End: 2023-12-15 | Stop reason: SURG

## 2023-12-15 RX ORDER — SODIUM CHLORIDE 9 MG/ML
INJECTION, SOLUTION INTRAVENOUS CONTINUOUS PRN
Status: DISCONTINUED | OUTPATIENT
Start: 2023-12-15 | End: 2023-12-15 | Stop reason: SURG

## 2023-12-15 RX ORDER — PAPAVERINE HYDROCHLORIDE 30 MG/ML
INJECTION INTRAMUSCULAR; INTRAVENOUS AS NEEDED
Status: DISCONTINUED | OUTPATIENT
Start: 2023-12-15 | End: 2023-12-15 | Stop reason: HOSPADM

## 2023-12-15 RX ORDER — ASPIRIN 325 MG
325 TABLET ORAL ONCE
Status: COMPLETED | OUTPATIENT
Start: 2023-12-15 | End: 2023-12-15

## 2023-12-15 RX ORDER — NOREPINEPHRINE BITARTRATE 0.03 MG/ML
.02-.3 INJECTION, SOLUTION INTRAVENOUS CONTINUOUS PRN
Status: DISCONTINUED | OUTPATIENT
Start: 2023-12-15 | End: 2023-12-18

## 2023-12-15 RX ORDER — ACETAMINOPHEN 10 MG/ML
INJECTION, SOLUTION INTRAVENOUS AS NEEDED
Status: DISCONTINUED | OUTPATIENT
Start: 2023-12-15 | End: 2023-12-15 | Stop reason: SURG

## 2023-12-15 RX ORDER — ALBUMIN, HUMAN INJ 5% 5 %
250 SOLUTION INTRAVENOUS AS NEEDED
Status: DISCONTINUED | OUTPATIENT
Start: 2023-12-15 | End: 2023-12-17

## 2023-12-15 RX ORDER — IBUPROFEN 600 MG/1
1 TABLET ORAL
Status: DISCONTINUED | OUTPATIENT
Start: 2023-12-15 | End: 2023-12-15 | Stop reason: SDUPTHER

## 2023-12-15 RX ORDER — DEXTROSE MONOHYDRATE 25 G/50ML
10-50 INJECTION, SOLUTION INTRAVENOUS
Status: DISCONTINUED | OUTPATIENT
Start: 2023-12-15 | End: 2023-12-15 | Stop reason: SDUPTHER

## 2023-12-15 RX ORDER — HYDROCODONE BITARTRATE AND ACETAMINOPHEN 7.5; 325 MG/1; MG/1
1 TABLET ORAL EVERY 4 HOURS PRN
Status: DISCONTINUED | OUTPATIENT
Start: 2023-12-15 | End: 2023-12-18

## 2023-12-15 RX ORDER — PROPOFOL 10 MG/ML
VIAL (ML) INTRAVENOUS CONTINUOUS PRN
Status: DISCONTINUED | OUTPATIENT
Start: 2023-12-15 | End: 2023-12-15 | Stop reason: SURG

## 2023-12-15 RX ORDER — BISACODYL 10 MG
10 SUPPOSITORY, RECTAL RECTAL DAILY PRN
Status: DISCONTINUED | OUTPATIENT
Start: 2023-12-15 | End: 2023-12-15 | Stop reason: SDUPTHER

## 2023-12-15 RX ORDER — ETOMIDATE 2 MG/ML
INJECTION INTRAVENOUS AS NEEDED
Status: DISCONTINUED | OUTPATIENT
Start: 2023-12-15 | End: 2023-12-15 | Stop reason: SURG

## 2023-12-15 RX ORDER — ASPIRIN 325 MG
325 TABLET, DELAYED RELEASE (ENTERIC COATED) ORAL DAILY
Status: DISCONTINUED | OUTPATIENT
Start: 2023-12-16 | End: 2023-12-18

## 2023-12-15 RX ORDER — HEPARIN SODIUM 1000 [USP'U]/ML
INJECTION, SOLUTION INTRAVENOUS; SUBCUTANEOUS AS NEEDED
Status: DISCONTINUED | OUTPATIENT
Start: 2023-12-15 | End: 2023-12-15 | Stop reason: SURG

## 2023-12-15 RX ORDER — CEFAZOLIN SODIUM 1 G/3ML
INJECTION, POWDER, FOR SOLUTION INTRAMUSCULAR; INTRAVENOUS AS NEEDED
Status: DISCONTINUED | OUTPATIENT
Start: 2023-12-15 | End: 2023-12-15 | Stop reason: SURG

## 2023-12-15 RX ORDER — ROCURONIUM BROMIDE 10 MG/ML
INJECTION, SOLUTION INTRAVENOUS AS NEEDED
Status: DISCONTINUED | OUTPATIENT
Start: 2023-12-15 | End: 2023-12-15 | Stop reason: SURG

## 2023-12-15 RX ORDER — PROTAMINE SULFATE 10 MG/ML
50 INJECTION, SOLUTION INTRAVENOUS ONCE
Status: COMPLETED | OUTPATIENT
Start: 2023-12-15 | End: 2023-12-15

## 2023-12-15 RX ORDER — AMOXICILLIN 250 MG
2 CAPSULE ORAL 2 TIMES DAILY
Status: DISCONTINUED | OUTPATIENT
Start: 2023-12-15 | End: 2023-12-15 | Stop reason: SDUPTHER

## 2023-12-15 RX ORDER — POTASSIUM CHLORIDE, DEXTROSE MONOHYDRATE 150; 5 MG/100ML; G/100ML
30 INJECTION, SOLUTION INTRAVENOUS CONTINUOUS
Status: DISCONTINUED | OUTPATIENT
Start: 2023-12-15 | End: 2023-12-19

## 2023-12-15 RX ORDER — MIDAZOLAM HYDROCHLORIDE 1 MG/ML
INJECTION INTRAMUSCULAR; INTRAVENOUS AS NEEDED
Status: DISCONTINUED | OUTPATIENT
Start: 2023-12-15 | End: 2023-12-15 | Stop reason: SURG

## 2023-12-15 RX ORDER — ACETAMINOPHEN 325 MG/1
650 TABLET ORAL EVERY 8 HOURS
Qty: 42 TABLET | Refills: 0 | Status: DISCONTINUED | OUTPATIENT
Start: 2023-12-16 | End: 2023-12-15

## 2023-12-15 RX ORDER — THROMBIN HUMAN AND FIBRINOGEN 2; 5.5 [USP'U]/1; MG/1
PATCH TOPICAL AS NEEDED
Status: DISCONTINUED | OUTPATIENT
Start: 2023-12-15 | End: 2023-12-15 | Stop reason: HOSPADM

## 2023-12-15 RX ORDER — ONDANSETRON 2 MG/ML
4 INJECTION INTRAMUSCULAR; INTRAVENOUS EVERY 6 HOURS PRN
Status: DISCONTINUED | OUTPATIENT
Start: 2023-12-15 | End: 2023-12-20 | Stop reason: HOSPADM

## 2023-12-15 RX ORDER — PROTAMINE SULFATE 10 MG/ML
INJECTION, SOLUTION INTRAVENOUS AS NEEDED
Status: DISCONTINUED | OUTPATIENT
Start: 2023-12-15 | End: 2023-12-15 | Stop reason: SURG

## 2023-12-15 RX ORDER — CEFAZOLIN SODIUM 1 G/3ML
INJECTION, POWDER, FOR SOLUTION INTRAMUSCULAR; INTRAVENOUS AS NEEDED
Status: DISCONTINUED | OUTPATIENT
Start: 2023-12-15 | End: 2023-12-15 | Stop reason: HOSPADM

## 2023-12-15 RX ORDER — LIDOCAINE HYDROCHLORIDE 10 MG/ML
0.5 INJECTION, SOLUTION EPIDURAL; INFILTRATION; INTRACAUDAL; PERINEURAL ONCE AS NEEDED
Status: COMPLETED | OUTPATIENT
Start: 2023-12-15 | End: 2023-12-15

## 2023-12-15 RX ORDER — METOPROLOL TARTRATE 1 MG/ML
2.5 INJECTION, SOLUTION INTRAVENOUS EVERY 6 HOURS SCHEDULED
Status: DISCONTINUED | OUTPATIENT
Start: 2023-12-15 | End: 2023-12-16

## 2023-12-15 RX ORDER — MEPERIDINE HYDROCHLORIDE 50 MG/ML
25 INJECTION INTRAMUSCULAR; INTRAVENOUS; SUBCUTANEOUS EVERY 4 HOURS PRN
Status: DISCONTINUED | OUTPATIENT
Start: 2023-12-15 | End: 2023-12-16

## 2023-12-15 RX ORDER — DEXMEDETOMIDINE HYDROCHLORIDE 4 UG/ML
.2-1.5 INJECTION, SOLUTION INTRAVENOUS CONTINUOUS PRN
Status: DISCONTINUED | OUTPATIENT
Start: 2023-12-15 | End: 2023-12-16

## 2023-12-15 RX ORDER — SODIUM CHLORIDE 9 MG/ML
40 INJECTION, SOLUTION INTRAVENOUS AS NEEDED
Status: DISCONTINUED | OUTPATIENT
Start: 2023-12-15 | End: 2023-12-15

## 2023-12-15 RX ORDER — ACETAMINOPHEN 325 MG/1
650 TABLET ORAL EVERY 6 HOURS PRN
Status: DISCONTINUED | OUTPATIENT
Start: 2023-12-15 | End: 2023-12-20 | Stop reason: HOSPADM

## 2023-12-15 RX ORDER — POTASSIUM CHLORIDE 29.8 MG/ML
20 INJECTION INTRAVENOUS
Status: DISPENSED | OUTPATIENT
Start: 2023-12-15 | End: 2023-12-15

## 2023-12-15 RX ORDER — DOBUTAMINE HYDROCHLORIDE 100 MG/100ML
2-20 INJECTION INTRAVENOUS CONTINUOUS PRN
Status: DISCONTINUED | OUTPATIENT
Start: 2023-12-15 | End: 2023-12-16

## 2023-12-15 RX ORDER — LIDOCAINE HYDROCHLORIDE 10 MG/ML
INJECTION, SOLUTION EPIDURAL; INFILTRATION; INTRACAUDAL; PERINEURAL AS NEEDED
Status: DISCONTINUED | OUTPATIENT
Start: 2023-12-15 | End: 2023-12-15 | Stop reason: SURG

## 2023-12-15 RX ORDER — DOPAMINE HYDROCHLORIDE 160 MG/100ML
2-20 INJECTION, SOLUTION INTRAVENOUS CONTINUOUS PRN
Status: DISCONTINUED | OUTPATIENT
Start: 2023-12-15 | End: 2023-12-16

## 2023-12-15 RX ORDER — ACETAMINOPHEN 325 MG/1
650 TABLET ORAL EVERY 8 HOURS
Status: DISCONTINUED | OUTPATIENT
Start: 2023-12-16 | End: 2023-12-20 | Stop reason: HOSPADM

## 2023-12-15 RX ORDER — NITROGLYCERIN 10 MG/100ML
INJECTION INTRAVENOUS CONTINUOUS PRN
Status: DISCONTINUED | OUTPATIENT
Start: 2023-12-15 | End: 2023-12-15 | Stop reason: SURG

## 2023-12-15 RX ORDER — SODIUM CHLORIDE 9 MG/ML
INJECTION, SOLUTION INTRAVENOUS AS NEEDED
Status: DISCONTINUED | OUTPATIENT
Start: 2023-12-15 | End: 2023-12-15 | Stop reason: HOSPADM

## 2023-12-15 RX ORDER — CHLORHEXIDINE GLUCONATE ORAL RINSE 1.2 MG/ML
15 SOLUTION DENTAL EVERY 12 HOURS SCHEDULED
Status: DISCONTINUED | OUTPATIENT
Start: 2023-12-15 | End: 2023-12-16

## 2023-12-15 RX ORDER — FENTANYL CITRATE 50 UG/ML
25 INJECTION, SOLUTION INTRAMUSCULAR; INTRAVENOUS
Status: DISCONTINUED | OUTPATIENT
Start: 2023-12-15 | End: 2023-12-16

## 2023-12-15 RX ORDER — MIDAZOLAM HYDROCHLORIDE 1 MG/ML
0.5 INJECTION INTRAMUSCULAR; INTRAVENOUS
Status: DISCONTINUED | OUTPATIENT
Start: 2023-12-15 | End: 2023-12-15 | Stop reason: HOSPADM

## 2023-12-15 RX ORDER — SODIUM CHLORIDE, SODIUM LACTATE, POTASSIUM CHLORIDE, CALCIUM CHLORIDE 600; 310; 30; 20 MG/100ML; MG/100ML; MG/100ML; MG/100ML
9 INJECTION, SOLUTION INTRAVENOUS CONTINUOUS
Status: DISCONTINUED | OUTPATIENT
Start: 2023-12-15 | End: 2023-12-15

## 2023-12-15 RX ORDER — AMINOCAPROIC ACID 250 MG/ML
INJECTION, SOLUTION INTRAVENOUS AS NEEDED
Status: DISCONTINUED | OUTPATIENT
Start: 2023-12-15 | End: 2023-12-15 | Stop reason: SURG

## 2023-12-15 RX ORDER — POLYETHYLENE GLYCOL 3350 17 G/17G
17 POWDER, FOR SOLUTION ORAL DAILY PRN
Status: DISCONTINUED | OUTPATIENT
Start: 2023-12-15 | End: 2023-12-15 | Stop reason: SDUPTHER

## 2023-12-15 RX ORDER — MORPHINE SULFATE 2 MG/ML
2 INJECTION, SOLUTION INTRAMUSCULAR; INTRAVENOUS
Status: DISCONTINUED | OUTPATIENT
Start: 2023-12-15 | End: 2023-12-16

## 2023-12-15 RX ORDER — ATORVASTATIN CALCIUM 40 MG/1
40 TABLET, FILM COATED ORAL NIGHTLY
Status: DISCONTINUED | OUTPATIENT
Start: 2023-12-15 | End: 2023-12-20 | Stop reason: HOSPADM

## 2023-12-15 RX ORDER — NITROGLYCERIN 400 UG/1
2 SPRAY ORAL
Status: DISCONTINUED | OUTPATIENT
Start: 2023-12-15 | End: 2023-12-20 | Stop reason: HOSPADM

## 2023-12-15 RX ORDER — VECURONIUM BROMIDE 1 MG/ML
INJECTION, POWDER, LYOPHILIZED, FOR SOLUTION INTRAVENOUS AS NEEDED
Status: DISCONTINUED | OUTPATIENT
Start: 2023-12-15 | End: 2023-12-15 | Stop reason: SURG

## 2023-12-15 RX ORDER — NICOTINE POLACRILEX 4 MG
15 LOZENGE BUCCAL
Status: DISCONTINUED | OUTPATIENT
Start: 2023-12-15 | End: 2023-12-15 | Stop reason: SDUPTHER

## 2023-12-15 RX ORDER — ACETAMINOPHEN 10 MG/ML
1000 INJECTION, SOLUTION INTRAVENOUS ONCE
Qty: 100 ML | Refills: 0 | Status: COMPLETED | OUTPATIENT
Start: 2023-12-15 | End: 2023-12-15

## 2023-12-15 RX ORDER — CALCIUM CHLORIDE 100 MG/ML
INJECTION INTRAVENOUS; INTRAVENTRICULAR AS NEEDED
Status: DISCONTINUED | OUTPATIENT
Start: 2023-12-15 | End: 2023-12-15 | Stop reason: SURG

## 2023-12-15 RX ORDER — SODIUM CHLORIDE 0.9 % (FLUSH) 0.9 %
10 SYRINGE (ML) INJECTION AS NEEDED
Status: DISCONTINUED | OUTPATIENT
Start: 2023-12-15 | End: 2023-12-15 | Stop reason: HOSPADM

## 2023-12-15 RX ORDER — ALBUTEROL SULFATE 2.5 MG/3ML
2.5 SOLUTION RESPIRATORY (INHALATION) EVERY 4 HOURS PRN
Status: ACTIVE | OUTPATIENT
Start: 2023-12-15 | End: 2023-12-16

## 2023-12-15 RX ORDER — NITROGLYCERIN 0.4 MG/1
0.4 TABLET SUBLINGUAL
Status: DISCONTINUED | OUTPATIENT
Start: 2023-12-15 | End: 2023-12-20 | Stop reason: HOSPADM

## 2023-12-15 RX ORDER — GABAPENTIN 100 MG/1
100 CAPSULE ORAL EVERY 8 HOURS
Status: DISCONTINUED | OUTPATIENT
Start: 2023-12-15 | End: 2023-12-15

## 2023-12-15 RX ORDER — SODIUM CHLORIDE 0.9 % (FLUSH) 0.9 %
10 SYRINGE (ML) INJECTION EVERY 12 HOURS SCHEDULED
Status: DISCONTINUED | OUTPATIENT
Start: 2023-12-15 | End: 2023-12-15 | Stop reason: HOSPADM

## 2023-12-15 RX ORDER — OXYCODONE HYDROCHLORIDE 10 MG/1
10 TABLET ORAL EVERY 4 HOURS PRN
Status: DISCONTINUED | OUTPATIENT
Start: 2023-12-15 | End: 2023-12-18

## 2023-12-15 RX ADMIN — MUPIROCIN 1 APPLICATION: 20 OINTMENT TOPICAL at 06:06

## 2023-12-15 RX ADMIN — ROCURONIUM BROMIDE 100 MG: 10 SOLUTION INTRAVENOUS at 11:00

## 2023-12-15 RX ADMIN — ALBUMIN (HUMAN) 250 ML: 12.5 INJECTION, SOLUTION INTRAVENOUS at 17:38

## 2023-12-15 RX ADMIN — ALBUMIN (HUMAN) 250 ML: 12.5 INJECTION, SOLUTION INTRAVENOUS at 14:48

## 2023-12-15 RX ADMIN — HEPARIN SODIUM 38000 UNITS: 1000 INJECTION INTRAVENOUS; SUBCUTANEOUS at 11:55

## 2023-12-15 RX ADMIN — ASPIRIN 325 MG ORAL TABLET 325 MG: 325 PILL ORAL at 16:37

## 2023-12-15 RX ADMIN — NOREPINEPHRINE BITARTRATE 0.02 MCG/KG/MIN: 1 INJECTION, SOLUTION, CONCENTRATE INTRAVENOUS at 14:19

## 2023-12-15 RX ADMIN — Medication 10 ML: at 09:03

## 2023-12-15 RX ADMIN — PROTAMINE SULFATE 50 MG: 10 INJECTION, SOLUTION INTRAVENOUS at 15:48

## 2023-12-15 RX ADMIN — POTASSIUM CHLORIDE AND DEXTROSE MONOHYDRATE 30 ML/HR: 150; 5 INJECTION, SOLUTION INTRAVENOUS at 14:48

## 2023-12-15 RX ADMIN — AMIODARONE HYDROCHLORIDE 1 MG/MIN: 1.8 INJECTION, SOLUTION INTRAVENOUS at 19:56

## 2023-12-15 RX ADMIN — VECURONIUM BROMIDE 4 MG: 1 INJECTION, POWDER, LYOPHILIZED, FOR SOLUTION INTRAVENOUS at 13:26

## 2023-12-15 RX ADMIN — PANTOPRAZOLE SODIUM 40 MG: 40 INJECTION, POWDER, LYOPHILIZED, FOR SOLUTION INTRAVENOUS at 06:07

## 2023-12-15 RX ADMIN — LEVOTHYROXINE SODIUM 137 MCG: 0.14 TABLET ORAL at 06:07

## 2023-12-15 RX ADMIN — MIDAZOLAM 4 MG: 1 INJECTION INTRAMUSCULAR; INTRAVENOUS at 11:00

## 2023-12-15 RX ADMIN — FENTANYL CITRATE 25 MCG: 50 INJECTION, SOLUTION INTRAMUSCULAR; INTRAVENOUS at 19:56

## 2023-12-15 RX ADMIN — VECURONIUM BROMIDE 6 MG: 1 INJECTION, POWDER, LYOPHILIZED, FOR SOLUTION INTRAVENOUS at 12:11

## 2023-12-15 RX ADMIN — SENNOSIDES AND DOCUSATE SODIUM 2 TABLET: 8.6; 5 TABLET ORAL at 20:28

## 2023-12-15 RX ADMIN — SODIUM CHLORIDE, POTASSIUM CHLORIDE, SODIUM LACTATE AND CALCIUM CHLORIDE 9 ML/HR: 600; 310; 30; 20 INJECTION, SOLUTION INTRAVENOUS at 09:02

## 2023-12-15 RX ADMIN — MIDAZOLAM 1 MG: 1 INJECTION INTRAMUSCULAR; INTRAVENOUS at 12:51

## 2023-12-15 RX ADMIN — PROTAMINE SULFATE 25 MG: 10 INJECTION, SOLUTION INTRAVENOUS at 13:19

## 2023-12-15 RX ADMIN — NITROGLYCERIN 0.2 MCG/KG/MIN: 10 INJECTION INTRAVENOUS at 11:38

## 2023-12-15 RX ADMIN — METOPROLOL SUCCINATE 50 MG: 50 TABLET, EXTENDED RELEASE ORAL at 09:19

## 2023-12-15 RX ADMIN — SODIUM CHLORIDE: 9 INJECTION, SOLUTION INTRAMUSCULAR; INTRAVENOUS; SUBCUTANEOUS at 11:12

## 2023-12-15 RX ADMIN — CHLORHEXIDINE GLUCONATE 15 ML: 1.2 SOLUTION ORAL at 06:06

## 2023-12-15 RX ADMIN — PROPOFOL 25 MCG/KG/MIN: 10 INJECTION, EMULSION INTRAVENOUS at 13:46

## 2023-12-15 RX ADMIN — FENTANYL CITRATE 250 MCG: 50 INJECTION, SOLUTION INTRAMUSCULAR; INTRAVENOUS at 11:13

## 2023-12-15 RX ADMIN — CHLORHEXIDINE GLUCONATE 15 ML: 1.2 SOLUTION ORAL at 20:28

## 2023-12-15 RX ADMIN — MORPHINE SULFATE 2 MG: 2 INJECTION, SOLUTION INTRAMUSCULAR; INTRAVENOUS at 23:04

## 2023-12-15 RX ADMIN — AMINOCAPROIC ACID 10 G: 250 INJECTION, SOLUTION INTRAVENOUS at 11:20

## 2023-12-15 RX ADMIN — VECURONIUM BROMIDE 6 MG: 1 INJECTION, POWDER, LYOPHILIZED, FOR SOLUTION INTRAVENOUS at 12:51

## 2023-12-15 RX ADMIN — ANTI-INHIBITOR COAGULANT COMPLEX 1500 UNITS: KIT at 13:23

## 2023-12-15 RX ADMIN — INSULIN HUMAN 1.9 UNITS/HR: 1 INJECTION, SOLUTION INTRAVENOUS at 11:27

## 2023-12-15 RX ADMIN — FENTANYL CITRATE 250 MCG: 50 INJECTION, SOLUTION INTRAMUSCULAR; INTRAVENOUS at 11:39

## 2023-12-15 RX ADMIN — SODIUM CHLORIDE 2 G: 900 INJECTION INTRAVENOUS at 11:15

## 2023-12-15 RX ADMIN — AMINOCAPROIC ACID 10 G: 250 INJECTION, SOLUTION INTRAVENOUS at 13:04

## 2023-12-15 RX ADMIN — MONTELUKAST 10 MG: 10 TABLET, FILM COATED ORAL at 20:28

## 2023-12-15 RX ADMIN — CEFAZOLIN 2 G: 1 INJECTION, POWDER, FOR SOLUTION INTRAMUSCULAR; INTRAVENOUS; PARENTERAL at 13:04

## 2023-12-15 RX ADMIN — ACETAMINOPHEN 1000 MG: 10 INJECTION INTRAVENOUS at 20:28

## 2023-12-15 RX ADMIN — SODIUM CHLORIDE 2 G: 900 INJECTION INTRAVENOUS at 20:28

## 2023-12-15 RX ADMIN — LIDOCAINE HYDROCHLORIDE 25 MG: 10 INJECTION, SOLUTION EPIDURAL; INFILTRATION; INTRACAUDAL; PERINEURAL at 11:00

## 2023-12-15 RX ADMIN — FENTANYL CITRATE 250 MCG: 50 INJECTION, SOLUTION INTRAMUSCULAR; INTRAVENOUS at 11:00

## 2023-12-15 RX ADMIN — LIDOCAINE HYDROCHLORIDE 0.5 ML: 10 INJECTION, SOLUTION EPIDURAL; INFILTRATION; INTRACAUDAL; PERINEURAL at 09:01

## 2023-12-15 RX ADMIN — HYDROCODONE BITARTRATE AND ACETAMINOPHEN 1 TABLET: 7.5; 325 TABLET ORAL at 16:37

## 2023-12-15 RX ADMIN — POTASSIUM CHLORIDE 20 MEQ: 29.8 INJECTION, SOLUTION INTRAVENOUS at 17:23

## 2023-12-15 RX ADMIN — ETOMIDATE 20 MG: 2 INJECTION INTRAVENOUS at 11:00

## 2023-12-15 RX ADMIN — ALBUMIN (HUMAN) 250 ML: 12.5 INJECTION, SOLUTION INTRAVENOUS at 15:15

## 2023-12-15 RX ADMIN — PROTAMINE SULFATE 450 MG: 10 INJECTION, SOLUTION INTRAVENOUS at 12:59

## 2023-12-15 RX ADMIN — ATORVASTATIN CALCIUM 40 MG: 40 TABLET, FILM COATED ORAL at 20:28

## 2023-12-15 RX ADMIN — AMIODARONE HYDROCHLORIDE 1 MG/MIN: 1.8 INJECTION, SOLUTION INTRAVENOUS at 13:05

## 2023-12-15 RX ADMIN — CALCIUM CHLORIDE 0.75 G: 100 INJECTION INTRAVENOUS; INTRAVENTRICULAR at 12:59

## 2023-12-15 RX ADMIN — ACETAMINOPHEN 1000 MG: 10 INJECTION INTRAVENOUS at 13:12

## 2023-12-15 RX ADMIN — MORPHINE SULFATE 2 MG: 2 INJECTION, SOLUTION INTRAMUSCULAR; INTRAVENOUS at 19:24

## 2023-12-15 RX ADMIN — ALBUMIN (HUMAN): 12.5 INJECTION, SOLUTION INTRAVENOUS at 14:17

## 2023-12-15 RX ADMIN — ONDANSETRON 4 MG: 2 INJECTION INTRAMUSCULAR; INTRAVENOUS at 23:03

## 2023-12-15 NOTE — ANESTHESIA PROCEDURE NOTES
Airway  Urgency: elective    Date/Time: 12/15/2023 11:00 AM  Airway not difficult    General Information and Staff    Patient location during procedure: OR  Anesthesiologist: Gerald Remy MD    Indications and Patient Condition  Indications for airway management: airway protection    Preoxygenated: yes  MILS not maintained throughout  Mask difficulty assessment: 1 - vent by mask    Final Airway Details  Final airway type: endotracheal airway      Successful airway: ETT  Cuffed: yes   Successful intubation technique: video laryngoscopy  Endotracheal tube insertion site: oral  Blade: Baldo  Blade size: 3  ETT size (mm): 7.5 (hi-lo)  Cormack-Lehane Classification: grade I - full view of glottis  Placement verified by: chest auscultation and capnometry   Measured from: lips  ETT/EBT  to lips (cm): 20  Number of attempts at approach: 1  Assessment: lips, teeth, and gum same as pre-op and atraumatic intubation    Additional Comments  Negative epigastric sounds, Breath sound equal bilaterally with symmetric chest rise and fall

## 2023-12-15 NOTE — ANESTHESIA PROCEDURE NOTES
Arterial Line      Patient reassessed immediately prior to procedure    Patient location during procedure: pre-op  Start time: 12/15/2023 9:10 AM  Stop Time:12/15/2023 9:19 AM       Line placed for hemodynamic monitoring.  Performed By   Anesthesiologist: Eugene Ferraro Jr., MD   Preanesthetic Checklist  Completed: patient identified, IV checked, site marked, risks and benefits discussed, surgical consent, monitors and equipment checked, pre-op evaluation and timeout performed  Arterial Line Prep    Sterile Tech: cap, gloves and sterile barriers  Prep: ChloraPrep  Patient monitoring: blood pressure monitoring, continuous pulse oximetry and EKG  Arterial Line Procedure   Laterality:right  Location:  radial artery  Catheter size: 20 G   Guidance: ultrasound guided  PROCEDURE NOTE/ULTRASOUND INTERPRETATION.  Using ultrasound guidance the potential vascular sites for insertion of the catheter were visualized to determine the patency of the vessel to be used for vascular access.  After selecting the appropriate site for insertion, the needle was visualized under ultrasound being inserted into the radial artery, followed by ultrasound confirmation of wire and catheter placement. There were no abnormalities seen on ultrasound; an image was taken; and the patient tolerated the procedure with no complications.   Number of attempts: 1  Successful placement: yes Images: still images not obtained  Post Assessment   Dressing Type: line sutured, occlusive dressing applied, secured with tape and wrist guard applied.   Complications no  Circ/Move/Sens Assessment: normal and unchanged.   Patient Tolerance: patient tolerated the procedure well with no apparent complications

## 2023-12-15 NOTE — ANESTHESIA POSTPROCEDURE EVALUATION
Patient: Joni Hoffman    Procedure Summary       Date: 12/15/23 Room / Location:  [facility] [address] [place] [address] [facility]    Anesthesia Start: 1052 Anesthesia Stop: 1452    Procedure: MEDIAN STERNTOMY, CORONARY ARTERY BYPASS X 3 WITH INTERNAL MAMMARY ARTERY GRAFT, ENDOVASCULAR VEIN HARVEST OF THE RIGHT GREATER SAPHENOUS VEIN (Chest) Diagnosis:       Coronary artery disease involving native coronary artery of native heart with unstable angina pectoris      (Coronary artery disease involving native coronary artery of native heart with unstable angina pectoris [I25.110])    Surgeons: Adam Darnell MD Provider: Gerald Remy MD    Anesthesia Type: general, Paula, CVL ASA Status: 4            Anesthesia Type: general, Paula, CVL    Vitals  Vitals Value Taken Time   BP     Temp     Pulse 99 12/15/23 1451   Resp     SpO2     Vitals shown include unfiled device data.        Post Anesthesia Care and Evaluation    Patient location during evaluation: ICU  Patient participation: complete - patient cannot participate  Level of consciousness: obtunded/minimal responses  Pain score: 0  Pain management: adequate    Airway patency: patent  Anesthetic complications: No anesthetic complications  PONV Status: none  Cardiovascular status: acceptable  Respiratory status: acceptable  Hydration status: acceptable

## 2023-12-15 NOTE — PROGRESS NOTES
" [facility] - Progress Note    Joni Hoffman  1945  [id number]/1    12/15/23, 08:15 EST      Chief Complaint: Following for NSTEMI    Subjective:   Plan for CABG later today.  No current angina.  Vitals stable.  We will see this weekend/follow post operatively.      Review of Systems:  Pertinent positives are listed above and in physical exam.  All others have been reviewed and are negative.    aspirin, 81 mg, Oral, Daily  ceFAZolin, 2,000 mg, Intravenous, Once  famotidine, 10 mg, Oral, Daily  insulin detemir, 5 Units, Subcutaneous, Nightly  insulin lispro, 2-9 Units, Subcutaneous, 4x Daily AC & at Bedtime  levothyroxine, 137 mcg, Oral, Q AM  metoprolol succinate XL, 50 mg, Oral, Q24H  montelukast, 10 mg, Oral, Nightly  pantoprazole, 40 mg, Intravenous, Q AM  pharmacy consult - MTM, , Does not apply, Daily  senna-docusate sodium, 2 tablet, Oral, BID  sodium chloride, 10 mL, Intravenous, Q12H  sodium chloride, 10 mL, Intravenous, Q12H  tamsulosin, 0.4 mg, Oral, Daily        Objective:  Vitals:   height is 167.6 cm (65.98\") and weight is 96.7 kg (213 lb 3.2 oz). His oral temperature is 97.4 °F (36.3 °C). His blood pressure is 168/82 and his pulse is 78. His respiration is 17 and oxygen saturation is 93%.     Intake/Output Summary (Last 24 hours) at 12/15/2023 0815  Last data filed at 12/14/2023 1800  Gross per 24 hour   Intake 2100 ml   Output --   Net 2100 ml       Physical Exam:  General:  WN, NAD, A and O x3.  CV:  Normal S1,S2. No murmur, rub, or gallop.  Resp:  CTA Behzad, equal, nonlabored.  Abd:  Soft, + BS, no organomegaly. Nontender to palpation.  Extrem:  No edema BLE, 2+ pedal/PT pulses.            Results from last 7 days   Lab Units 12/14/23  0550   WBC 10*3/mm3 5.75   HEMOGLOBIN g/dL 14.0   HEMATOCRIT % 42.5   PLATELETS 10*3/mm3 165     Results from last 7 days   Lab Units 12/14/23  0550 12/13/23  0331 12/12/23  1920   SODIUM mmol/L 140   < > 142   POTASSIUM mmol/L 4.1   < > 4.4 "   CHLORIDE mmol/L 105   < > 102   CO2 mmol/L 26.0   < > 29.0   BUN mg/dL 18   < > 24*   CREATININE mg/dL 1.06   < > 1.25   CALCIUM mg/dL 8.6   < > 9.5   BILIRUBIN mg/dL  --   --  0.2   ALK PHOS U/L  --   --  72   ALT (SGPT) U/L  --   --  18   AST (SGOT) U/L  --   --  20   GLUCOSE mg/dL 146*   < > 188*    < > = values in this interval not displayed.     Results from last 7 days   Lab Units 12/12/23  1920   INR  0.93     Results from last 7 days   Lab Units 12/13/23  0331   TSH uIU/mL 2.640     Results from last 7 days   Lab Units 12/13/23  0331   CHOLESTEROL mg/dL 103   TRIGLYCERIDES mg/dL 122   HDL CHOL mg/dL 34*   LDL CHOL mg/dL 47     Results from last 7 days   Lab Units 12/12/23  1920   PROBNP pg/mL 733.6       Tele:  NSR      Assessment:  -78-year-old CM with past medical history of CAD, HTN, HLP, DM 2 presents to local hospital after 2 to 3-day history of exertional chest pain and dyspnea with normal EKG, elevated cardiac markers.  Repeat serial troponins here at [facility] are also elevated, findings consistent with NSTEMI. MV CAD by [facility] 12/13/23.  -CAD, remote stenting  -HTN  -HLP  -DM 2, A1C  7  -Hypothyroidism on chronic supplementation  -BPH           Plan:  -Admitted to hospitalist services.    - Plan for possible CABG today, Dr. Darnell.  CTS following.  -Continue low-dose aspirin.  He is on Repatha at home.  Patient did receive loading dose Plavix 300 mg  -  Continue Toprol XL 50mg PO daily.  Hold off on ACE/ARB/ARNI pre operative and will follow closely post operatively.  -Continue Synthroid  -  Cardiology will follow post operatively      I discussed the patient's findings and my recommendations with the patient, any present family members, and the nursing staff.  Magan Mcmanus MD saw and examined patient, verified hx and PE, read all radiographic studies, reviewed labs and micro data, and formulated dx, plan for treatment and all medical decision making.      Jennifer Granger PA-C  12/15/23, 08:17  EST

## 2023-12-15 NOTE — PROGRESS NOTES
CTS Progress Note       LOS: 2 days   Patient Care Team:  Little Barker MD as PCP - General (Internal Medicine)  Little Barker MD as Referring Physician (Internal Medicine)  Bry Dill MD as Consulting Physician (Cardiology)    Chief Complaint: NSTEMI (non-ST elevated myocardial infarction)    Vital Signs:  Temp:  [97.3 °F (36.3 °C)-98 °F (36.7 °C)] 97.5 °F (36.4 °C)  Heart Rate:  [72-99] 78  Resp:  [18] 18  BP: (127-176)/(47-80) 149/69    Physical Exam: No further angina       Results:     Results from last 7 days   Lab Units 12/14/23  0550   WBC 10*3/mm3 5.75   HEMOGLOBIN g/dL 14.0   HEMATOCRIT % 42.5   PLATELETS 10*3/mm3 165     Results from last 7 days   Lab Units 12/14/23  0550   SODIUM mmol/L 140   POTASSIUM mmol/L 4.1   CHLORIDE mmol/L 105   CO2 mmol/L 26.0   BUN mg/dL 18   CREATININE mg/dL 1.06   GLUCOSE mg/dL 146*   CALCIUM mg/dL 8.6           Imaging Results (Last 24 Hours)       ** No results found for the last 24 hours. **            Assessment      NSTEMI (non-ST elevated myocardial infarction)    HTN (hypertension)    CAD (coronary artery disease)    Hypothyroidism (acquired)    T2DM (type 2 diabetes mellitus)    Nocturnal hypoxia    HLD (hyperlipidemia)    NSTEMI, initial episode of care        Plan   For coronary bypass grafting later today patient is well apprised of the risk involved    Please note that portions of this note were completed with a voice recognition program. Efforts were made to edit the dictations, but occasionally words are mistranscribed.    Adam Darnell MD  12/15/23  06:28 EST

## 2023-12-15 NOTE — ANESTHESIA PREPROCEDURE EVALUATION
Anesthesia Evaluation     Patient summary reviewed and Nursing notes reviewed   no history of anesthetic complications:   NPO Solid Status: > 8 hours  NPO Liquid Status: > 2 hours           Airway   Mallampati: II  TM distance: >3 FB  Neck ROM: full  No difficulty expected  Dental - normal exam     Pulmonary - normal exam    breath sounds clear to auscultation  (-) COPD, asthma, sleep apnea, not a smoker  Cardiovascular   Exercise tolerance: poor (<4 METS)    ECG reviewed  Patient on routine beta blocker and Beta blocker given within 24 hours of surgery    (+) hypertension, CAD, cardiac stents more than 12 months ago , angina, hyperlipidemia  (-) murmur      Neuro/Psych  (-) seizures, CVA  GI/Hepatic/Renal/Endo    (+) diabetes mellitus, thyroid problem   (-) liver disease    Musculoskeletal     Abdominal    Substance History      OB/GYN          Other   arthritis,                       Anesthesia Plan    ASA 4     general, Paula and CVL     intravenous induction     Anesthetic plan, risks, benefits, and alternatives have been provided, discussed and informed consent has been obtained with: patient.    Use of blood products discussed with patient  Consented to blood products.    Plan discussed with CRNA.        CODE STATUS:    Code Status (Patient has no pulse and is not breathing): CPR (Attempt to Resuscitate)  Medical Interventions (Patient has pulse or is breathing): Full Support

## 2023-12-15 NOTE — ANESTHESIA PROCEDURE NOTES
Central Line      Patient reassessed immediately prior to procedure    Patient location during procedure: OR  Indications: vascular access  Staff  Anesthesiologist: Gerald Remy MD  Preanesthetic Checklist  Completed: patient identified, IV checked, site marked, risks and benefits discussed, surgical consent, monitors and equipment checked, pre-op evaluation and timeout performed  Central Line Prep  Sterile Tech:cap, gloves, gown, mask and sterile barriers  Prep: chloraprep  Patient monitoring: blood pressure monitoring, continuous pulse oximetry and EKG  Central Line Procedure  Laterality:right  Location:internal jugular  Catheter Type:MAC  Catheter Size:9 Fr  Guidance:ultrasound guided  PROCEDURE NOTE/ULTRASOUND INTERPRETATION.  Using ultrasound guidance the potential vascular sites for insertion of the catheter were visualized to determine the patency of the vessel to be used for vascular access.  After selecting the appropriate site for insertion, the needle was visualized under ultrasound being inserted into the internal jugular vein, followed by ultrasound confirmation of wire and catheter placement. There were no abnormalities seen on ultrasound; an image was taken; and the patient tolerated the procedure with no complications. Images: still images obtained, printed/placed on chart  Assessment  Post procedure:biopatch applied, line sutured, occlusive dressing applied and secured with tape  Assessement:blood return through all ports, free fluid flow, chest x-ray ordered and Robert Test  Complications:no  Patient Tolerance:patient tolerated the procedure well with no apparent complications

## 2023-12-16 ENCOUNTER — APPOINTMENT (OUTPATIENT)
Dept: GENERAL RADIOLOGY | Facility: HOSPITAL | Age: 78
End: 2023-12-16
Payer: MEDICARE

## 2023-12-16 LAB
ALBUMIN SERPL-MCNC: 4.2 G/DL (ref 3.5–5.2)
ALBUMIN/GLOB SERPL: 3.8 G/DL
ALP SERPL-CCNC: 43 U/L (ref 39–117)
ALT SERPL W P-5'-P-CCNC: 21 U/L (ref 1–41)
ANION GAP SERPL CALCULATED.3IONS-SCNC: 12 MMOL/L (ref 5–15)
AST SERPL-CCNC: 48 U/L (ref 1–40)
BASOPHILS # BLD AUTO: 0.03 10*3/MM3 (ref 0–0.2)
BASOPHILS NFR BLD AUTO: 0.2 % (ref 0–1.5)
BH BB BLOOD EXPIRATION DATE: NORMAL
BH BB BLOOD EXPIRATION DATE: NORMAL
BH BB BLOOD TYPE BARCODE: 6200
BH BB BLOOD TYPE BARCODE: 6200
BH BB DISPENSE STATUS: NORMAL
BH BB DISPENSE STATUS: NORMAL
BH BB PRODUCT CODE: NORMAL
BH BB PRODUCT CODE: NORMAL
BH BB UNIT NUMBER: NORMAL
BH BB UNIT NUMBER: NORMAL
BILIRUB SERPL-MCNC: 0.2 MG/DL (ref 0–1.2)
BUN SERPL-MCNC: 15 MG/DL (ref 8–23)
BUN/CREAT SERPL: 9.3 (ref 7–25)
CALCIUM SPEC-SCNC: 8.5 MG/DL (ref 8.6–10.5)
CHLORIDE SERPL-SCNC: 107 MMOL/L (ref 98–107)
CO2 SERPL-SCNC: 23 MMOL/L (ref 22–29)
CREAT SERPL-MCNC: 1.62 MG/DL (ref 0.76–1.27)
DEPRECATED RDW RBC AUTO: 47 FL (ref 37–54)
EGFRCR SERPLBLD CKD-EPI 2021: 43.2 ML/MIN/1.73
EOSINOPHIL # BLD AUTO: 0.02 10*3/MM3 (ref 0–0.4)
EOSINOPHIL NFR BLD AUTO: 0.2 % (ref 0.3–6.2)
ERYTHROCYTE [DISTWIDTH] IN BLOOD BY AUTOMATED COUNT: 13.2 % (ref 12.3–15.4)
GLOBULIN UR ELPH-MCNC: 1.1 GM/DL
GLUCOSE BLDC GLUCOMTR-MCNC: 140 MG/DL (ref 70–130)
GLUCOSE BLDC GLUCOMTR-MCNC: 141 MG/DL (ref 70–130)
GLUCOSE BLDC GLUCOMTR-MCNC: 147 MG/DL (ref 70–130)
GLUCOSE BLDC GLUCOMTR-MCNC: 148 MG/DL (ref 70–130)
GLUCOSE BLDC GLUCOMTR-MCNC: 151 MG/DL (ref 70–130)
GLUCOSE BLDC GLUCOMTR-MCNC: 153 MG/DL (ref 70–130)
GLUCOSE BLDC GLUCOMTR-MCNC: 154 MG/DL (ref 70–130)
GLUCOSE BLDC GLUCOMTR-MCNC: 154 MG/DL (ref 70–130)
GLUCOSE BLDC GLUCOMTR-MCNC: 164 MG/DL (ref 70–130)
GLUCOSE BLDC GLUCOMTR-MCNC: 171 MG/DL (ref 70–130)
GLUCOSE BLDC GLUCOMTR-MCNC: 173 MG/DL (ref 70–130)
GLUCOSE BLDC GLUCOMTR-MCNC: 176 MG/DL (ref 70–130)
GLUCOSE BLDC GLUCOMTR-MCNC: 179 MG/DL (ref 70–130)
GLUCOSE BLDC GLUCOMTR-MCNC: 184 MG/DL (ref 70–130)
GLUCOSE BLDC GLUCOMTR-MCNC: 186 MG/DL (ref 70–130)
GLUCOSE SERPL-MCNC: 155 MG/DL (ref 65–99)
HCT VFR BLD AUTO: 28.5 % (ref 37.5–51)
HGB BLD-MCNC: 9.2 G/DL (ref 13–17.7)
IMM GRANULOCYTES # BLD AUTO: 0.06 10*3/MM3 (ref 0–0.05)
IMM GRANULOCYTES NFR BLD AUTO: 0.5 % (ref 0–0.5)
INR PPP: 1.2 (ref 0.89–1.12)
LYMPHOCYTES # BLD AUTO: 0.74 10*3/MM3 (ref 0.7–3.1)
LYMPHOCYTES NFR BLD AUTO: 5.8 % (ref 19.6–45.3)
MAGNESIUM SERPL-MCNC: 2 MG/DL (ref 1.6–2.4)
MCH RBC QN AUTO: 31.2 PG (ref 26.6–33)
MCHC RBC AUTO-ENTMCNC: 32.3 G/DL (ref 31.5–35.7)
MCV RBC AUTO: 96.6 FL (ref 79–97)
MONOCYTES # BLD AUTO: 1.33 10*3/MM3 (ref 0.1–0.9)
MONOCYTES NFR BLD AUTO: 10.4 % (ref 5–12)
NEUTROPHILS NFR BLD AUTO: 10.6 10*3/MM3 (ref 1.7–7)
NEUTROPHILS NFR BLD AUTO: 82.9 % (ref 42.7–76)
NRBC BLD AUTO-RTO: 0 /100 WBC (ref 0–0.2)
PLATELET # BLD AUTO: 165 10*3/MM3 (ref 140–450)
PMV BLD AUTO: 10.4 FL (ref 6–12)
POTASSIUM SERPL-SCNC: 4.3 MMOL/L (ref 3.5–5.2)
PROT SERPL-MCNC: 5.3 G/DL (ref 6–8.5)
PROTHROMBIN TIME: 15.4 SECONDS (ref 12.2–14.5)
QT INTERVAL: 392 MS
QT INTERVAL: 398 MS
QTC INTERVAL: 429 MS
QTC INTERVAL: 476 MS
RBC # BLD AUTO: 2.95 10*6/MM3 (ref 4.14–5.8)
SODIUM SERPL-SCNC: 142 MMOL/L (ref 136–145)
UNIT  ABO: NORMAL
UNIT  ABO: NORMAL
UNIT  RH: NORMAL
UNIT  RH: NORMAL
WBC NRBC COR # BLD AUTO: 12.78 10*3/MM3 (ref 3.4–10.8)

## 2023-12-16 PROCEDURE — 99024 POSTOP FOLLOW-UP VISIT: CPT | Performed by: THORACIC SURGERY (CARDIOTHORACIC VASCULAR SURGERY)

## 2023-12-16 PROCEDURE — 99231 SBSQ HOSP IP/OBS SF/LOW 25: CPT | Performed by: INTERNAL MEDICINE

## 2023-12-16 PROCEDURE — 85610 PROTHROMBIN TIME: CPT | Performed by: PHYSICIAN ASSISTANT

## 2023-12-16 PROCEDURE — 93010 ELECTROCARDIOGRAM REPORT: CPT | Performed by: INTERNAL MEDICINE

## 2023-12-16 PROCEDURE — 80053 COMPREHEN METABOLIC PANEL: CPT | Performed by: PHYSICIAN ASSISTANT

## 2023-12-16 PROCEDURE — 71045 X-RAY EXAM CHEST 1 VIEW: CPT

## 2023-12-16 PROCEDURE — 25810000003 SODIUM CHLORIDE 0.9 % SOLUTION 250 ML FLEX CONT: Performed by: PHYSICIAN ASSISTANT

## 2023-12-16 PROCEDURE — 85025 COMPLETE CBC W/AUTO DIFF WBC: CPT | Performed by: PHYSICIAN ASSISTANT

## 2023-12-16 PROCEDURE — 94799 UNLISTED PULMONARY SVC/PX: CPT

## 2023-12-16 PROCEDURE — 97116 GAIT TRAINING THERAPY: CPT

## 2023-12-16 PROCEDURE — 82948 REAGENT STRIP/BLOOD GLUCOSE: CPT

## 2023-12-16 PROCEDURE — 25010000002 MORPHINE PER 10 MG: Performed by: THORACIC SURGERY (CARDIOTHORACIC VASCULAR SURGERY)

## 2023-12-16 PROCEDURE — 25010000002 AMIODARONE IN DEXTROSE 5% 360-4.14 MG/200ML-% SOLUTION: Performed by: THORACIC SURGERY (CARDIOTHORACIC VASCULAR SURGERY)

## 2023-12-16 PROCEDURE — 25010000002 CEFAZOLIN PER 500 MG: Performed by: PHYSICIAN ASSISTANT

## 2023-12-16 PROCEDURE — 94761 N-INVAS EAR/PLS OXIMETRY MLT: CPT

## 2023-12-16 PROCEDURE — 97161 PT EVAL LOW COMPLEX 20 MIN: CPT

## 2023-12-16 PROCEDURE — 93005 ELECTROCARDIOGRAM TRACING: CPT | Performed by: PHYSICIAN ASSISTANT

## 2023-12-16 PROCEDURE — 83735 ASSAY OF MAGNESIUM: CPT | Performed by: PHYSICIAN ASSISTANT

## 2023-12-16 RX ORDER — MORPHINE SULFATE 2 MG/ML
2 INJECTION, SOLUTION INTRAMUSCULAR; INTRAVENOUS
Status: DISCONTINUED | OUTPATIENT
Start: 2023-12-16 | End: 2023-12-18

## 2023-12-16 RX ADMIN — SENNOSIDES AND DOCUSATE SODIUM 2 TABLET: 8.6; 5 TABLET ORAL at 20:35

## 2023-12-16 RX ADMIN — PANTOPRAZOLE SODIUM 40 MG: 40 INJECTION, POWDER, LYOPHILIZED, FOR SOLUTION INTRAVENOUS at 05:45

## 2023-12-16 RX ADMIN — MORPHINE SULFATE 2 MG: 2 INJECTION, SOLUTION INTRAMUSCULAR; INTRAVENOUS at 08:33

## 2023-12-16 RX ADMIN — ATORVASTATIN CALCIUM 40 MG: 40 TABLET, FILM COATED ORAL at 20:35

## 2023-12-16 RX ADMIN — INSULIN HUMAN 3.4 UNITS/HR: 1 INJECTION, SOLUTION INTRAVENOUS at 05:46

## 2023-12-16 RX ADMIN — ACETAMINOPHEN 650 MG: 325 TABLET ORAL at 13:53

## 2023-12-16 RX ADMIN — ASPIRIN 325 MG: 325 TABLET, COATED ORAL at 08:15

## 2023-12-16 RX ADMIN — Medication 12.5 MG: at 20:35

## 2023-12-16 RX ADMIN — SENNOSIDES AND DOCUSATE SODIUM 2 TABLET: 8.6; 5 TABLET ORAL at 08:16

## 2023-12-16 RX ADMIN — METOPROLOL TARTRATE 2.5 MG: 5 INJECTION INTRAVENOUS at 05:45

## 2023-12-16 RX ADMIN — METOPROLOL TARTRATE 2.5 MG: 5 INJECTION INTRAVENOUS at 00:00

## 2023-12-16 RX ADMIN — SODIUM CHLORIDE 2 G: 900 INJECTION INTRAVENOUS at 13:54

## 2023-12-16 RX ADMIN — SODIUM CHLORIDE 2 G: 900 INJECTION INTRAVENOUS at 22:00

## 2023-12-16 RX ADMIN — ACETAMINOPHEN 650 MG: 325 TABLET ORAL at 22:00

## 2023-12-16 RX ADMIN — FAMOTIDINE 10 MG: 20 TABLET, FILM COATED ORAL at 08:15

## 2023-12-16 RX ADMIN — LEVOTHYROXINE SODIUM 137 MCG: 0.14 TABLET ORAL at 05:45

## 2023-12-16 RX ADMIN — HYDROCODONE BITARTRATE AND ACETAMINOPHEN 1 TABLET: 7.5; 325 TABLET ORAL at 10:40

## 2023-12-16 RX ADMIN — MONTELUKAST 10 MG: 10 TABLET, FILM COATED ORAL at 20:35

## 2023-12-16 RX ADMIN — MORPHINE SULFATE 2 MG: 2 INJECTION, SOLUTION INTRAMUSCULAR; INTRAVENOUS at 20:35

## 2023-12-16 RX ADMIN — TAMSULOSIN HYDROCHLORIDE 0.4 MG: 0.4 CAPSULE ORAL at 08:15

## 2023-12-16 RX ADMIN — SODIUM CHLORIDE 2 G: 900 INJECTION INTRAVENOUS at 05:45

## 2023-12-16 RX ADMIN — AMIODARONE HYDROCHLORIDE 1 MG/MIN: 1.8 INJECTION, SOLUTION INTRAVENOUS at 02:07

## 2023-12-16 RX ADMIN — CHLORHEXIDINE GLUCONATE 15 ML: 1.2 SOLUTION ORAL at 08:15

## 2023-12-16 RX ADMIN — Medication 12.5 MG: at 08:15

## 2023-12-16 RX ADMIN — NICARDIPINE HYDROCHLORIDE 5 MG/HR: 25 INJECTION, SOLUTION INTRAVENOUS at 08:00

## 2023-12-16 RX ADMIN — ACETAMINOPHEN 650 MG: 325 TABLET ORAL at 05:45

## 2023-12-16 NOTE — PLAN OF CARE
Goal Outcome Evaluation:  Plan of Care Reviewed With: spouse        Progress: improving  Outcome Evaluation: CABG x3 today, 2 plt and 1500 feiba in OR, came out to 2H at 14:51 on levo, amio, insulin gtts, extubated at 19:10. VSS, will continue to monitor.

## 2023-12-16 NOTE — OP NOTE
Operative Report    Preop Diagnosis: Coronary disease.  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    Results of STS risk score discussed with patient and family    Postoperative Diagnosis: Same      Procedure: Repair of arteriovenous fistula between coronary artery and coronary vein.  Coronary artery bypass grafting x 3 with endoscopic harvesting of the right great saphenous vein.  Left internal mammary artery to left anterior descending.  Saphenous vein graft to the obtuse marginal branch of the circumflex and saphenous vein graft to the posterior descending branch of the right        Surgeons: Adam Darnell MD      Assistant: Alfredo Romero PA-C    The Assistant provided services of suctioning, irrigation and retraction.  Also, assisted in suture closure of parts of the skin incision.      Indication: This patient was referred with the above listed medical problems.  Interestingly catheterization demonstrated what also appeared to be an arteriovenous fistula in addition to his coronary disease.  It was described as a fistula between a diagonal branch and a vein however I examined these films carefully with Dr. Mcmanus and it looks like this is in the area where a previous stent had been placed in the left anterior descending coronary with subsequent rupture or communication to an adjacent vein.  Patient understood that surgery had a risk of stroke bleeding infection death and the STS risk database was discussed with the patient and he agreed to proceed        Description: Supine position sterile prep and drape.  Antibiotics given general endotracheal anesthesia.  The right great saphenous vein was harvested endoscopically.  Median sternotomy performed and the left internal mammary harvested as a pedicle graft.  The patient was heparinized and cannulas placed in the ascending aortic aneurysm atrial appendage.  The patient was begun on  cardiopulmonary bypass and the aorta was crossclamped and antegrade cardioplegia given.  For saphenous vein graft was sutured to a 2 mm obtuse marginal branch of the circumflex.  Second saphenous vein graft sutured to a 2 mm posterior descending branch of the right.  This point we turned our attention to a fistulous connection between the anterior descending coronary and adjacent vein.  Using 6-0 Prolene sutures in a number of locations this arteriovenous fistula was repaired.        After repair of the arteriovenous fistula, we proceeded with the anastomosis of the left anterior descending coronary to the left internal mammary artery.  This was done with 7-0 Prolene suture.  The 2 proximal vein grafts were sutured to the ascending aorta and the patient weaned from bypass without difficulty.  Protamine was given to reverse the heparin.  Sternum was closed with wire the fashion skin was suture and the sponge and needle count reported as correct.  Estimated blood loss 350 mL and there was no apparent early complications      Please note that portions of this note were completed with a voice recognition program. Efforts were made to edit the dictations, but occasionally words are mistranscribed.

## 2023-12-16 NOTE — PLAN OF CARE
Goal Outcome Evaluation:           Progress: no change  Outcome Evaluation: PT evaluation completed. Pt s/p CABGx3, presents with mobility below baseline, will benefit from IP PT services. Pt req assist of 2 with standing and ambulating 50' with B UE support on monitor, req one standing rest break due to fatigue. Recommend home with assist upon DC.      Anticipated Discharge Disposition (PT): home with assist

## 2023-12-16 NOTE — PROGRESS NOTES
CTS Progress Note       LOS: 3 days   Patient Care Team:  Little Barker MD as PCP - General (Internal Medicine)  Little Barker MD as Referring Physician (Internal Medicine)  Bry Dill MD as Consulting Physician (Cardiology)    Chief Complaint: NSTEMI (non-ST elevated myocardial infarction)    Vital Signs:  Temp:  [95.2 °F (35.1 °C)-100.6 °F (38.1 °C)] 100.6 °F (38.1 °C)  Heart Rate:  [68-93] 69  Resp:  [14-26] 20  BP: ()/(31-88) 110/60  FiO2 (%):  [40 %-100 %] 40 %    Physical Exam: Extubated.  Up in chair.  No neurologic deficits.       Results:     Results from last 7 days   Lab Units 12/16/23  0304   WBC 10*3/mm3 12.78*   HEMOGLOBIN g/dL 9.2*   HEMATOCRIT % 28.5*   PLATELETS 10*3/mm3 165     Results from last 7 days   Lab Units 12/16/23  0304   SODIUM mmol/L 142   POTASSIUM mmol/L 4.3   CHLORIDE mmol/L 107   CO2 mmol/L 23.0   BUN mg/dL 15   CREATININE mg/dL 1.62*   GLUCOSE mg/dL 155*   CALCIUM mg/dL 8.5*           Imaging Results (Last 24 Hours)       Procedure Component Value Units Date/Time    XR Chest 1 View [[id number]] Resulted: 12/16/23 0230     Updated: 12/16/23 0306    XR Chest 1 View [[id number]] Collected: 12/15/23 1528     Updated: 12/15/23 1535    Narrative:      XR CHEST 1 VW    Date of Exam: 12/15/2023 2:52 PM EST    Indication: Post-Op Check Line & Tube Placement    Comparison: 12/12/2023    Findings:  Lines: Endotracheal tube approximately 4.1 cm above the marshall. Enteric tube is noted entering the stomach, with the distal tip in the stomach, although the sideport of the enteric tube is noted at the level of the GE junction.  Adequate position of bilateral chest tubes and mediastinal drainage catheter.  Right internal jugular venous catheter with the distal tip in the mid SVC.    Lungs: Poor respiratory effort accentuates the pulmonary vasculature and cardiomediastinal silhouette. No definite consolidation. Hazy opacities in the lung bases most likely represents  atelectasis.  Pleura: No pleural effusion or pneumothorax.    Cardiomediastinum: Postsurgical changes are noted in the cardiomediastinal silhouette.    Soft Tissues: Unremarkable.    Bones: No acute osseous abnormality. Patient is status post median sternotomy      Impression:      Impression:  Lines and tubes as characterized above. Please note that the enteric tube is noted with the distal tip in the stomach, although the sideport is at the level of the GE junction. Please consider advancing by 3 to 4 cm.      Electronically Signed: Enrike Walters DO    12/15/2023 3:32 PM EST    Workstation ID: [id number]            Assessment      NSTEMI (non-ST elevated myocardial infarction)    HTN (hypertension)    CAD (coronary artery disease)    Hypothyroidism (acquired)    T2DM (type 2 diabetes mellitus)    Nocturnal hypoxia    HLD (hyperlipidemia)    Satisfactory course less than 24 hours postoperative.  Subtle increase in creatinine noted    Plan   CBC and CMP in the a.m.    Please note that portions of this note were completed with a voice recognition program. Efforts were made to edit the dictations, but occasionally words are mistranscribed.    Adam Darnell MD  12/16/23  06:49 EST

## 2023-12-16 NOTE — PROGRESS NOTES
"INTENSIVIST NOTE     Hospital Day: 3    Mr. Joni Hoffman, 78 y.o. male is followed for:   Perioperative management of comorbid medical conditions including glycemic control       SUBJECTIVE     Interval history:    Awake and alert he is on high flow nasal cannula.  Respiratory status is comfortable.  Have been able to wean his FiO2 to 40%.  His creatinine is up significantly at 1.62 and I/O even yesterday and positive today.  Maximum temperature 100.6.  Insulin drip continues at 6.1 currently.  Off all pressors.  Chest x-ray reveals mild basilar atelectasis.  Hemoglobin stable.    The patient's relevant past medical, surgical and social history were reviewed and updated in Epic as appropriate.       OBJECTIVE     Vital Sign Min/Max for last 24 hours  Temp  Min: 97.9 °F (36.6 °C)  Max: 100.6 °F (38.1 °C)   BP  Min: 88/49  Max: 138/58   Pulse  Min: 68  Max: 88   Resp  Min: 18  Max: 26   SpO2  Min: 88 %  Max: 97 %   No data recorded   No data recorded      Intake/Output Summary (Last 24 hours) at 12/16/2023 1742  Last data filed at 12/16/2023 1600  Gross per 24 hour   Intake 2889 ml   Output 1810 ml   Net 1079 ml      Flowsheet Rows      Flowsheet Row First Filed Value   Admission Height 167.6 cm (66\") Documented at 12/12/2023 1911   Admission Weight 93 kg (205 lb) Documented at 12/12/2023 1911 12/12/23  1911 12/13/23  1828 12/15/23  0622   Weight: 93 kg (205 lb) 93 kg (205 lb 0.4 oz) 96.7 kg (213 lb 3.2 oz)            Objective:  General Appearance:  In no acute distress.    Vital signs: (most recent): Blood pressure 138/58, pulse 83, temperature 99.5 °F (37.5 °C), temperature source Bladder, resp. rate 22, height 167.6 cm (65.98\"), weight 96.7 kg (213 lb 3.2 oz), SpO2 92%.    HEENT: (High flow nasal cannula  Right internal jugular introducer)    Lungs:  Normal effort and normal respiratory rate.  He is not in respiratory distress.  There are decreased breath sounds.  No rales, wheezes or rhonchi.  "   Heart: Normal rate.  Regular rhythm.  S1 normal and S2 normal.  No murmur or gallop.   Chest: Symmetric chest wall expansion. (Median sternotomy.  Mediastinal tubes in place)  Abdomen: Abdomen is soft and non-distended.  Hypoactive bowel sounds.   There is no mass. There is no splenomegaly. There is no hepatomegaly.   Extremities: There is no deformity or dependent edema.  (Right radial arterial line)  Neurological: Patient is alert.    Pupils:  Pupils are equal, round, and reactive to light.    Skin:  Warm and dry.                I reviewed the patient's new clinical results.  I reviewed the patient's new imaging results/reports including actual images and agree with reports.    XR Chest 1 View    Result Date: 12/16/2023  Impression: Mild bibasilar atelectasis. Electronically Signed: Marissa Mast MD  12/16/2023 7:34 AM CST  Workstation ID: [id number]    XR Chest 1 View    Result Date: 12/15/2023  Impression: Lines and tubes as characterized above. Please note that the enteric tube is noted with the distal tip in the stomach, although the sideport is at the level of the GE junction. Please consider advancing by 3 to 4 cm. Electronically Signed: Enrike Walters DO  12/15/2023 3:32 PM EST  Workstation ID: [id number]      INFUSIONS  dextrose 5 % with KCl 20 mEq, 30 mL/hr, Last Rate: 30 mL/hr (12/15/23 1448)  insulin, 0-100 Units/hr, Last Rate: 6.1 Units/hr (12/16/23 1206)  niCARdipine, 5-15 mg/hr, Last Rate: Stopped (12/16/23 0850)  norepinephrine, 0.02-0.3 mcg/kg/min, Last Rate: Stopped (12/16/23 0400)  phenylephrine, 0.5-3 mcg/kg/min        Results from last 7 days   Lab Units 12/16/23  0304 12/15/23  1831 12/15/23  1743 12/15/23  1502   WBC 10*3/mm3 12.78* 14.03*  --  13.92*   HEMOGLOBIN g/dL 9.2* 9.4* 9.6* 10.5*   HEMATOCRIT % 28.5* 28.7* 29.4* 31.8*   PLATELETS 10*3/mm3 165 176  --  174     Results from last 7 days   Lab Units 12/16/23  0304 12/15/23  1831 12/15/23  1502   SODIUM mmol/L 142 143 143    POTASSIUM mmol/L 4.3 4.0 3.5   CHLORIDE mmol/L 107 107 108*   CO2 mmol/L 23.0 24.0 25.0   BUN mg/dL 15 15 15   GLUCOSE mg/dL 155* 181* 137*   CREATININE mg/dL 1.62* 1.25 1.19   MAGNESIUM mg/dL 2.0 2.0 2.3   CALCIUM mg/dL 8.5* 8.8 8.8   ALBUMIN g/dL 4.2 4.4 3.9         Results from last 7 days   Lab Units 12/15/23  1903 12/15/23  1546   PH, ARTERIAL pH units 7.376 7.349*   PCO2, ARTERIAL mm Hg 40.8 44.8   PO2 ART mm Hg 73.7* 171.0*   FIO2 % 40 21       Patient isn't on Tube Feeding   /h  Patient doesn't have any tube feeding modular orders    Mechanical Ventilator:   Settings: Observed:   Mode: (S) PS (12/15/23 1834)    Vt (Set, mL): 510 mL (12/15/23 1716) Vt Mandatory Ins (observed, mL): 6000 mL (12/15/23 1910)   Resp Rate (Set): 16 (12/15/23 1716) Resp Rate (Observed) Vent: 23 (12/15/23 1910)   Pressure Support (cm H2O): 10 cm H20 (12/15/23 1910) Minute Ventilation (L/min) (Obs): 5.85 L/min (12/15/23 1910)       FiO2 (%): 40 % (12/15/23 1910)     PEEP/CPAP (cm H2O): 5 cm H20 (12/15/23 1910) I:E Ratio (Obs): 16.67:1 (12/15/23 1910)         I reviewed the patient's medications.    Assessment & Plan   ASSESSMENT/PLAN     Active Hospital Problems    Diagnosis     **NSTEMI (non-ST elevated myocardial infarction)     Nocturnal hypoxia     HLD (hyperlipidemia)     CAD (coronary artery disease)     Hypothyroidism (acquired)     T2DM (type 2 diabetes mellitus)     HTN (hypertension)        78-year-old male with a past medical history of CAD with prior stents, hypertension, dyslipidemia, type 2 diabetes mellitus, and hypothyroidism.  He underwent CABG x 3 on 12/15 performed by Dr. Darnell.     Today is awake and alert.  He is on high flow nasal cannula.  Respiratory status is comfortable.  Have been able to wean his FiO2 to 40%.  His creatinine is up significantly at 1.62 and I/O even yesterday and positive today.  Maximum temperature 100.6.  Insulin drip continues at 6.1 currently.  Off all pressors.  Chest x-ray reveals  mild basilar atelectasis.  Hemoglobin stable.    Plan:    Encourage pulmonary toilet  Adequate pain control to facilitate above  Wean FiO2 as tolerated  Avoid further diuresis if possible due to increasing creatinine as long as gas exchange reasonable  Monitor GFR and UO closely  Aspirin/statin  Continue insulin drip for now until rate decreased and can be transition to subcutaneous     I discussed the patient's findings and my recommendations with patient and nursing staff     Plan of care and goals reviewed with multidisciplinary team at daily rounds.    .    Myron Marr MD  Pulmonary and Critical Care Medicine  12/16/23 17:42 EST

## 2023-12-16 NOTE — THERAPY EVALUATION
Patient Name: Joni Hoffman  : 1945    MRN: [id number]                              Today's Date: 2023       Admit Date: 2023    Visit Dx:     ICD-10-CM ICD-9-CM   1. NSTEMI (non-ST elevated myocardial infarction)  I21.4 410.70   2. History of diabetes mellitus  Z86.39 V12.29   3. History of hypertension  Z86.79 V12.59   4. History of hyperlipidemia  Z86.39 V12.29   5. History of hypothyroidism  Z86.39 V12.29   6. History of coronary artery disease  Z86.79 V12.59   7. NSTEMI, initial episode of care  I21.4 410.71   8. 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    Recurrent herniation of lumbar disc    Bulging lumbar disc    Actinic keratosis    Chronic heart disease    Encounter for long-term (current) use of insulin    HTN (hypertension)    Senile hyperkeratosis    NSTEMI (non-ST elevated myocardial infarction)    CAD (coronary artery disease)    Hypothyroidism (acquired)    T2DM (type 2 diabetes mellitus)    Nocturnal hypoxia    HLD (hyperlipidemia)     Past Medical History:   Diagnosis Date    Acid indigestion     related to meds    Arthritis     Coronary artery disease     s/p 2 stents after MI     Diabetes mellitus     po meds; checks blood sugars at home run     Disease of thyroid gland     hypothyroidism     Hearing loss     uses hearing aids     History of kidney stones     passed    Hypertension     Lumbar back pain     MI, old     2 stents inserted    Wears glasses     Wears hearing aid      Past Surgical History:   Procedure Laterality Date    APPENDECTOMY      CARDIAC CATHETERIZATION N/A 2023    Procedure: Left Heart Cath;  Surgeon: Elza Mena MD;  Location: [facility] CATH INVASIVE LOCATION;  Service: Cardiology;  Laterality: N/A;    CATARACT EXTRACTION WITH INTRAOCULAR LENS  IMPLANT Bilateral     COLONOSCOPY      CORONARY STENT PLACEMENT  2000    x2    INTERVENTIONAL RADIOLOGY PROCEDURE N/A 12/31/2019    Procedure: myelogram lumbar spine;  Surgeon: Karan Pennington MD;  Location:  [facility] CATH INVASIVE LOCATION;  Service: Interventional Radiology    LUMBAR DISCECTOMY Left 10/27/2017    Procedure: LUMBAR DISCECTOMY L5-S1 LEFT ;  Surgeon: Elbert De Anda MD;  Location:  [facility];  Service:     LUMBAR DISCECTOMY Left 2/19/2020    Procedure: RE-DO LUMBAR DISCECTOMY L5-S1 LEFT;  Surgeon: Elbert De Anda MD;  Location:  [facility];  Service: Neurosurgery;  Laterality: Left;    NASAL POLYP SURGERY        General Information       Row Name 12/16/23 1419          Physical Therapy Time and Intention    Document Type evaluation  -DEO     Mode of Treatment physical therapy  -DEO       Row Name 12/16/23 1419          General Information    Patient Profile Reviewed yes  -DEO     Prior Level of Function independent:;all household mobility;community mobility;using stairs;ADL's  -DEO     Existing Precautions/Restrictions cardiac;sternal;oxygen therapy device and L/min;fall  -DEO     Barriers to Rehab medically complex  -DEO       Row Name 12/16/23 1419          Living Environment    People in Home spouse  -DEO       Row Name 12/16/23 1419          Home Main Entrance    Number of Stairs, Main Entrance three  -DEO     Stair Railings, Main Entrance none  -DEO       Row Name 12/16/23 1419          Stairs Within Home, Primary    Stairs, Within Home, Primary Split level. Pt must use to get to bedroom/bathroom  -DEO     Number of Stairs, Within Home, Primary eight  -DEO     Stair Railings, Within Home, Primary railing on right side (ascending)  -DEO       Row Name 12/16/23 1419          Cognition    Orientation Status (Cognition) oriented x 3  -DEO       Row Name 12/16/23 1419          Safety Issues, Functional Mobility    Safety Issues Affecting Function (Mobility) awareness of need for assistance;insight into  deficits/self-awareness;positioning of assistive device;safety precautions follow-through/compliance  -DEO     Impairments Affecting Function (Mobility) balance;coordination;endurance/activity tolerance;shortness of breath  -DEO               User Key  (r) = Recorded By, (t) = Taken By, (c) = Cosigned By      Initials Name Provider Type    Vicky Quiroz PT Physical Therapist                   Mobility       Row Name 12/16/23 1420          Bed Mobility    Comment, (Bed Mobility) Deferred, pt UIC  -DEO       Row Name 12/16/23 1420          Transfers    Comment, (Transfers) Pt req cues for sternal precautions  -DEO       Row Name 12/16/23 1420          Sit-Stand Transfer    Sit-Stand [place] (Transfers) minimum assist (75% patient effort);2 person assist;verbal cues  -DEO     Comment, (Sit-Stand Transfer) Cues to maintain sternal precautions  -EDO       Row Name 12/16/23 1420          Gait/Stairs (Locomotion)    [place] Level (Gait) minimum assist (75% patient effort);2 person assist;verbal cues  -DEO     Assistive Device (Gait) other (see comments)  B UE support on tripod monitor  -DEO     Patient was able to Ambulate yes  -DEO     Distance in Feet (Gait) 50  -DEO     Deviations/Abnormal Patterns (Gait) gait speed decreased;stride length decreased  -DEO     Comment, (Gait/Stairs) Pt required cues to avoid looking down and for increased step length. Req one short standing rest break due to fatigue.  -DEO               User Key  (r) = Recorded By, (t) = Taken By, (c) = Cosigned By      Initials Name Provider Type    Vicky Quiroz PT Physical Therapist                   Obj/Interventions       Row Name 12/16/23 1423          Range of Motion Comprehensive    General Range of Motion bilateral lower extremity ROM WFL  -DEO       Row Name 12/16/23 1423          Strength Comprehensive (MMT)    General Manual Muscle Testing (MMT) Assessment lower extremity strength deficits identified  -DEO     Comment, General  Manual Muscle Testing (MMT) Assessment B LEs grossly 4/5  -DEO       Row Name 12/16/23 1423          Balance    Balance Assessment sitting static balance;sitting dynamic balance;sit to stand dynamic balance;standing static balance  -DEO     Static Sitting Balance contact guard  -DEO     Dynamic Sitting Balance minimal assist  -DEO     Position, Sitting Balance unsupported;sitting in chair  -DEO     Sit to Stand Dynamic Balance minimal assist;2-person assist  -DEO     Static Standing Balance minimal assist  -DEO     Position/Device Used, Standing Balance supported  -DEO     Balance Interventions sitting;standing;sit to stand  -DEO     Comment, Balance no LOB but unsteady upon first standing.  -DEO       Row Name 12/16/23 1423          Sensory Assessment (Somatosensory)    Sensory Assessment (Somatosensory) LE sensation intact  -DEO               User Key  (r) = Recorded By, (t) = Taken By, (c) = Cosigned By      Initials Name Provider Type    DEO Vicky Cornell, PT Physical Therapist                   Goals/Plan       Row Name 12/16/23 1427          Bed Mobility Goal 1 (PT)    Activity/Assistive Device (Bed Mobility Goal 1, PT) sit to supine/supine to sit  -DEO     [place] Level/Cues Needed (Bed Mobility Goal 1, PT) modified independence  -DEO     Time Frame (Bed Mobility Goal 1, PT) 10 days  -DEO       Row Name 12/16/23 1427          Transfer Goal 1 (PT)    Activity/Assistive Device (Transfer Goal 1, PT) sit-to-stand/stand-to-sit  -DEO     [place] Level/Cues Needed (Transfer Goal 1, PT) modified independence  -DEO     Time Frame (Transfer Goal 1, PT) 10 days  -DEO       Row Name 12/16/23 1427          Gait Training Goal 1 (PT)    Activity/Assistive Device (Gait Training Goal 1, PT) gait (walking locomotion)  -DEO     [place] Level (Gait Training Goal 1, PT) modified independence  -DEO     Distance (Gait Training Goal 1, PT) 250  -DEO     Time Frame (Gait Training Goal 1, PT) 10 days  -       Row Name 12/16/23 1424           Stairs Goal 1 (PT)    Activity/Assistive Device (Stairs Goal 1, PT) stairs, all skills  -DEO     [place] Level/Cues Needed (Stairs Goal 1, PT) modified independence  -DEO     Number of Stairs (Stairs Goal 1, PT) 8  -DEO     Time Frame (Stairs Goal 1, PT) 10 days  -DEO       Row Name 12/16/23 7367          Therapy Assessment/Plan (PT)    Planned Therapy Interventions (PT) balance training;gait training;bed mobility training;home exercise program;postural re-education;strengthening;stair training;transfer training;patient/family education  -DEO               User Key  (r) = Recorded By, (t) = Taken By, (c) = Cosigned By      Initials Name Provider Type    Vicky Quiroz, PT Physical Therapist                   Clinical Impression       Row Name 12/16/23 8925          Pain    Pretreatment Pain Rating 0/10 - no pain  -DEO     Posttreatment Pain Rating 0/10 - no pain  -DEO       Row Name 12/16/23 0160          Plan of Care Review    Progress no change  -DEO     Outcome Evaluation PT evaluation completed. Pt s/p CABGx3, presents with mobility below baseline, will benefit from IP PT services. Pt req assist of 2 with standing and ambulating 50' with B UE support on monitor, req one standing rest break due to fatigue. Recommend home with assist upon DC.  -DEO       Row Name 12/16/23 2654          Therapy Assessment/Plan (PT)    Patient/Family Therapy Goals Statement (PT) return home  -DEO     Rehab Potential (PT) good, to achieve stated therapy goals  -DEO     Criteria for Skilled Interventions Met (PT) yes;skilled treatment is necessary  -DEO     Therapy Frequency (PT) daily  -DEO       Row Name 12/16/23 9514          Vital Signs    Pre Systolic BP Rehab 126  -DEO     Pre Treatment Diastolic BP 60  -DEO     Post Systolic BP Rehab --  left with nsg  -DEO     Pretreatment Heart Rate (beats/min) 76  -DEO     Pre SpO2 (%) 94  -DEO     O2 Delivery Pre Treatment hi-flow  -DEO     O2 Delivery Intra Treatment non-rebreather  -DEO      O2 Delivery Post Treatment hi-flow  -DEO     Pre Patient Position Sitting  -DEO     Intra Patient Position Standing  -DEO     Post Patient Position Sitting  -DEO       Row Name 12/16/23 1424          Positioning and Restraints    Pre-Treatment Position sitting in chair/recliner  -DEO     Post Treatment Position chair  -DEO     In Chair notified nsg;reclined;call light within reach;encouraged to call for assist;with nsg  -DEO               User Key  (r) = Recorded By, (t) = Taken By, (c) = Cosigned By      Initials Name Provider Type    Vicky Quiroz PT Physical Therapist                   Outcome Measures       Row Name 12/16/23 1428          How much help from another person do you currently need...    Turning from your back to your side while in flat bed without using bedrails? 2  -DEO     Moving from lying on back to sitting on the side of a flat bed without bedrails? 2  -EDO     Moving to and from a bed to a chair (including a wheelchair)? 4  -DEO     Standing up from a chair using your arms (e.g., wheelchair, bedside chair)? 3  -DEO     Climbing 3-5 steps with a railing? 1  -DEO     To walk in hospital room? 2  -DEO     AM-PAC 6 Clicks Score (PT) 14  -DEO     Highest Level of Mobility Goal 4 --> Transfer to chair/commode  -DEO       Row Name 12/16/23 1428          Functional Assessment    Outcome Measure Options AM-PAC 6 Clicks Basic Mobility (PT)  -DEO               User Key  (r) = Recorded By, (t) = Taken By, (c) = Cosigned By      Initials Name Provider Type    Vicky Quiroz PT Physical Therapist                                 Physical Therapy Education       Title: PT OT SLP Therapies (In Progress)       Topic: Physical Therapy (In Progress)       Point: Mobility training (Done)       Learning Progress Summary             Patient Acceptance, E, VU,NR by DEO at 12/16/2023 1428                         Point: Home exercise program (Not Started)       Learner Progress:  Not documented in this visit.               Point: Body mechanics (Done)       Learning Progress Summary             Patient Acceptance, E, VU,NR by  at 12/16/2023 1428                         Point: Precautions (Done)       Learning Progress Summary             Patient Acceptance, E, VU,NR by DEO at 12/16/2023 1428                                         User Key       Initials Effective Dates Name Provider Type Discipline     06/16/21 -  Vicky Cornell, PT Physical Therapist PT                  PT Recommendation and Plan  Planned Therapy Interventions (PT): balance training, gait training, bed mobility training, home exercise program, postural re-education, strengthening, stair training, transfer training, patient/family education  Progress: no change  Outcome Evaluation: PT evaluation completed. Pt s/p CABGx3, presents with mobility below baseline, will benefit from IP PT services. Pt req assist of 2 with standing and ambulating 50' with B UE support on monitor, req one standing rest break due to fatigue. Recommend home with assist upon DC.     Time Calculation:   PT Evaluation Complexity  History, PT Evaluation Complexity: 1-2 personal factors and/or comorbidities  Examination of Body Systems (PT Eval Complexity): 1-2 elements  Clinical Presentation (PT Evaluation Complexity): evolving  Clinical Decision Making (PT Evaluation Complexity): low complexity  Overall Complexity (PT Evaluation Complexity): low complexity     PT Charges       Row Name 12/16/23 1429             Time Calculation    Start Time 1322  -DEO      PT Received On 12/16/23  -DEO      PT Goal Re-Cert Due Date 12/26/23  -DEO         Time Calculation- PT    Total Timed Code Minutes- PT 11 minute(s)  -DEO         Timed Charges    28763 - Gait Training Minutes  11  -DEO         Untimed Charges    PT Eval/Re-eval Minutes 54  -DEO         Total Minutes    Timed Charges Total Minutes 11  -DEO      Untimed Charges Total Minutes 54  -DEO       Total Minutes 65  -DEO                User Key  (r) =  Recorded By, (t) = Taken By, (c) = Cosigned By      Initials Name Provider Type    DEO Vicky Cornell, PT Physical Therapist                  Therapy Charges for Today       Code Description Service Date Service Provider Modifiers Qty    72993075063 HC GAIT TRAINING EA 15 MIN 12/16/2023 Vicky Cornell, PT GP 1    75132286739 HC PT EVAL LOW COMPLEXITY 4 12/16/2023 Vicky Cornell, PT GP 1            PT G-Codes  Outcome Measure Options: AM-PAC 6 Clicks Basic Mobility (PT)  AM-PAC 6 Clicks Score (PT): 14  PT Discharge Summary  Anticipated Discharge Disposition (PT): home with assist    Vicky Cornell PT  12/16/2023

## 2023-12-16 NOTE — PROGRESS NOTES
"      Cardiac Electrophysiology Inpatient Follow Up Note         [facility]    Progress Note    INITIAL REASON FOR CONSULT: HTN    CHIEF COMPLAINT:  Chief Complaint   Patient presents with    Chest Pain     HTN    Subjective   Mr. Joni Hoffman denies vomiting, abdominal pain, and diarrhea.        Objective   VITAL SIGNS  /58   Pulse 72   Temp 99.5 °F (37.5 °C) (Bladder)   Resp 18   Ht 167.6 cm (65.98\")   Wt 96.7 kg (213 lb 3.2 oz)   SpO2 92%   BMI 34.43 kg/m²   [unfilled]  Pulse Ox: SpO2  Av.9 %  Min: 88 %  Max: 100 %  Supplemental O2:       Admit Weight  Weight: 93 kg (205 lb)  Last 3 Weights  [unfilled]  Body mass index is 34.43 kg/m².    INTAKE/OUTPUT  I/O last 3 completed shifts:  In: 4102 [I.V.:1853; Blood:672; IV Piggyback:1577]  Out: 4125 [Urine:3235; Chest Tube:890]    Intake/Output Summary (Last 24 hours) at 2023 1356  Last data filed at 2023 1000  Gross per 24 hour   Intake 3230 ml   Output 3310 ml   Net -80 ml       PHYSICAL EXAM  General appearance: awake, alert, oriented, moves all extremities  Lungs: no rhonchi, no wheezes, no rales  Heart: RRR  Abdomen: positive bowel sounds, no bruits, no masses  Extremities: warm and dry, no cyanosis, no clubbing    LABS  Results from last 7 days   Lab Units 23  0304 12/15/23  1831 12/15/23  1743 12/15/23  1502   WBC 10*3/mm3 12.78* 14.03*  --  13.92*   HEMOGLOBIN g/dL 9.2* 9.4* 9.6* 10.5*   HEMATOCRIT % 28.5* 28.7* 29.4* 31.8*   MCV fL 96.6 95.3  --  94.9   PLATELETS 10*3/mm3 165 176  --  174         Results from last 7 days   Lab Units 23  0304 12/15/23  1831 12/15/23  1502   POTASSIUM mmol/L 4.3 4.0 3.5   CHLORIDE mmol/L 107 107 108*   CO2 mmol/L 23.0 24.0 25.0   BUN mg/dL 15 15 15   CREATININE mg/dL 1.62* 1.25 1.19   GLUCOSE mg/dL 155* 181* 137*   CALCIUM mg/dL 8.5* 8.8 8.8   MAGNESIUM mg/dL 2.0 2.0 2.3     Results from last 7 days   Lab Units 23  0304 12/15/23  1502 " "12/12/23  1920   PROTIME Seconds 15.4* 15.2* 12.5   INR  1.20* 1.19* 0.93         Results from last 7 days   Lab Units 12/16/23  0304 12/15/23  1831 12/15/23  1502   MAGNESIUM mg/dL 2.0 2.0 2.3                 No results found for: \"CHOL\", \"TRIG\", \"HDL\"    CURRENT MEDICATIONS  acetaminophen, 650 mg, Oral, Q8H  aspirin, 325 mg, Oral, Daily  atorvastatin, 40 mg, Oral, Nightly  ceFAZolin, 2 g, Intravenous, Q8H  famotidine, 10 mg, Oral, Daily  levothyroxine, 137 mcg, Oral, Q AM  metoprolol tartrate, 12.5 mg, Oral, Q12H  metoprolol tartrate, 2.5 mg, Intravenous, Q6H  montelukast, 10 mg, Oral, Nightly  pantoprazole, 40 mg, Intravenous, Q AM  pharmacy consult - [facility], , Does not apply, Daily  senna-docusate sodium, 2 tablet, Oral, BID  sodium chloride, 10 mL, Intravenous, Q12H  tamsulosin, 0.4 mg, Oral, Daily      CONTINUOUS INFUSIONS  amiodarone, 1 mg/min, Last Rate: Stopped (12/16/23 0242)  dexmedetomidine, 0.2-1.5 mcg/kg/hr  dextrose 5 % with KCl 20 mEq, 30 mL/hr, Last Rate: 30 mL/hr (12/15/23 1448)  insulin, 0-100 Units/hr, Last Rate: 6.1 Units/hr (12/16/23 1206)  niCARdipine, 5-15 mg/hr, Last Rate: Stopped (12/16/23 0850)  norepinephrine, 0.02-0.3 mcg/kg/min, Last Rate: Stopped (12/16/23 0400)  phenylephrine, 0.5-3 mcg/kg/min               Diagnosis Plan   1. NSTEMI (non-ST elevated myocardial infarction)  POD# 1 CABG  Per CTS   3. History of hypertension  Well controlled   Body mass index is 34.43 kg/m².    I spent 35 minutes in consultation with this patient which included more than 65% of this time in direct face-to-face counseling, physical examination and discussion of my assessment and findings and this shared decision making with the patient.  The remainder of the time not spent face-to-face was performing one, some or all of the following actions: preparing to see the patient (e.g. reviewing tests, prior clinicians' notes, etc), ordering medications, tests or procedures, coordination of care, discussion of the " plan with other healthcare providers, documenting clinical information in epic as well as independently interpreting results and communication of these results to the patient family and/or caregiver(s).  Please note that this explicitly excludes time spent on other separate billable services such as performing procedures or test interpretation, when applicable.        Christian Vance DO, FACC, RS  Cardiac Electrophysiologist  [facility] / [facility]

## 2023-12-17 ENCOUNTER — APPOINTMENT (OUTPATIENT)
Dept: GENERAL RADIOLOGY | Facility: HOSPITAL | Age: 78
End: 2023-12-17
Payer: MEDICARE

## 2023-12-17 LAB
ALBUMIN SERPL-MCNC: 3.5 G/DL (ref 3.5–5.2)
ALBUMIN/GLOB SERPL: 1.8 G/DL
ALP SERPL-CCNC: 45 U/L (ref 39–117)
ALT SERPL W P-5'-P-CCNC: 11 U/L (ref 1–41)
ANION GAP SERPL CALCULATED.3IONS-SCNC: 10 MMOL/L (ref 5–15)
AST SERPL-CCNC: 33 U/L (ref 1–40)
BH BB BLOOD EXPIRATION DATE: NORMAL
BH BB BLOOD EXPIRATION DATE: NORMAL
BH BB BLOOD TYPE BARCODE: 9500
BH BB BLOOD TYPE BARCODE: 9500
BH BB DISPENSE STATUS: NORMAL
BH BB DISPENSE STATUS: NORMAL
BH BB PRODUCT CODE: NORMAL
BH BB PRODUCT CODE: NORMAL
BH BB UNIT NUMBER: NORMAL
BH BB UNIT NUMBER: NORMAL
BILIRUB SERPL-MCNC: 0.4 MG/DL (ref 0–1.2)
BUN SERPL-MCNC: 17 MG/DL (ref 8–23)
BUN/CREAT SERPL: 12.4 (ref 7–25)
CALCIUM SPEC-SCNC: 8.6 MG/DL (ref 8.6–10.5)
CHLORIDE SERPL-SCNC: 100 MMOL/L (ref 98–107)
CO2 SERPL-SCNC: 24 MMOL/L (ref 22–29)
CREAT SERPL-MCNC: 1.37 MG/DL (ref 0.76–1.27)
CROSSMATCH INTERPRETATION: NORMAL
CROSSMATCH INTERPRETATION: NORMAL
DEPRECATED RDW RBC AUTO: 46 FL (ref 37–54)
EGFRCR SERPLBLD CKD-EPI 2021: 52.8 ML/MIN/1.73
ERYTHROCYTE [DISTWIDTH] IN BLOOD BY AUTOMATED COUNT: 13.3 % (ref 12.3–15.4)
GLOBULIN UR ELPH-MCNC: 2 GM/DL
GLUCOSE BLDC GLUCOMTR-MCNC: 102 MG/DL (ref 70–130)
GLUCOSE BLDC GLUCOMTR-MCNC: 117 MG/DL (ref 70–130)
GLUCOSE BLDC GLUCOMTR-MCNC: 120 MG/DL (ref 70–130)
GLUCOSE BLDC GLUCOMTR-MCNC: 120 MG/DL (ref 70–130)
GLUCOSE BLDC GLUCOMTR-MCNC: 160 MG/DL (ref 70–130)
GLUCOSE BLDC GLUCOMTR-MCNC: 161 MG/DL (ref 70–130)
GLUCOSE BLDC GLUCOMTR-MCNC: 163 MG/DL (ref 70–130)
GLUCOSE BLDC GLUCOMTR-MCNC: 170 MG/DL (ref 70–130)
GLUCOSE BLDC GLUCOMTR-MCNC: 171 MG/DL (ref 70–130)
GLUCOSE BLDC GLUCOMTR-MCNC: 172 MG/DL (ref 70–130)
GLUCOSE BLDC GLUCOMTR-MCNC: 173 MG/DL (ref 70–130)
GLUCOSE BLDC GLUCOMTR-MCNC: 189 MG/DL (ref 70–130)
GLUCOSE BLDC GLUCOMTR-MCNC: 207 MG/DL (ref 70–130)
GLUCOSE BLDC GLUCOMTR-MCNC: 221 MG/DL (ref 70–130)
GLUCOSE BLDC GLUCOMTR-MCNC: 232 MG/DL (ref 70–130)
GLUCOSE BLDC GLUCOMTR-MCNC: 257 MG/DL (ref 70–130)
GLUCOSE SERPL-MCNC: 139 MG/DL (ref 65–99)
HCT VFR BLD AUTO: 25.3 % (ref 37.5–51)
HGB BLD-MCNC: 8.2 G/DL (ref 13–17.7)
MCH RBC QN AUTO: 30.9 PG (ref 26.6–33)
MCHC RBC AUTO-ENTMCNC: 32.4 G/DL (ref 31.5–35.7)
MCV RBC AUTO: 95.5 FL (ref 79–97)
PLATELET # BLD AUTO: 125 10*3/MM3 (ref 140–450)
PMV BLD AUTO: 10.3 FL (ref 6–12)
POTASSIUM SERPL-SCNC: 3.7 MMOL/L (ref 3.5–5.2)
POTASSIUM SERPL-SCNC: 4.2 MMOL/L (ref 3.5–5.2)
PROT SERPL-MCNC: 5.5 G/DL (ref 6–8.5)
QT INTERVAL: 384 MS
QTC INTERVAL: 456 MS
RBC # BLD AUTO: 2.65 10*6/MM3 (ref 4.14–5.8)
SODIUM SERPL-SCNC: 134 MMOL/L (ref 136–145)
UNIT  ABO: NORMAL
UNIT  ABO: NORMAL
UNIT  RH: NORMAL
UNIT  RH: NORMAL
WBC NRBC COR # BLD AUTO: 11.56 10*3/MM3 (ref 3.4–10.8)

## 2023-12-17 PROCEDURE — 63710000001 INSULIN DETEMIR PER 5 UNITS: Performed by: INTERNAL MEDICINE

## 2023-12-17 PROCEDURE — 63710000001 INSULIN LISPRO (HUMAN) PER 5 UNITS: Performed by: INTERNAL MEDICINE

## 2023-12-17 PROCEDURE — 93010 ELECTROCARDIOGRAM REPORT: CPT | Performed by: INTERNAL MEDICINE

## 2023-12-17 PROCEDURE — 82948 REAGENT STRIP/BLOOD GLUCOSE: CPT

## 2023-12-17 PROCEDURE — 71045 X-RAY EXAM CHEST 1 VIEW: CPT

## 2023-12-17 PROCEDURE — 93005 ELECTROCARDIOGRAM TRACING: CPT | Performed by: PHYSICIAN ASSISTANT

## 2023-12-17 PROCEDURE — 25010000002 CEFAZOLIN PER 500 MG: Performed by: PHYSICIAN ASSISTANT

## 2023-12-17 PROCEDURE — 99231 SBSQ HOSP IP/OBS SF/LOW 25: CPT | Performed by: INTERNAL MEDICINE

## 2023-12-17 PROCEDURE — 85027 COMPLETE CBC AUTOMATED: CPT | Performed by: PHYSICIAN ASSISTANT

## 2023-12-17 PROCEDURE — 99024 POSTOP FOLLOW-UP VISIT: CPT | Performed by: STUDENT IN AN ORGANIZED HEALTH CARE EDUCATION/TRAINING PROGRAM

## 2023-12-17 PROCEDURE — 84132 ASSAY OF SERUM POTASSIUM: CPT | Performed by: THORACIC SURGERY (CARDIOTHORACIC VASCULAR SURGERY)

## 2023-12-17 PROCEDURE — 80053 COMPREHEN METABOLIC PANEL: CPT | Performed by: THORACIC SURGERY (CARDIOTHORACIC VASCULAR SURGERY)

## 2023-12-17 RX ORDER — INSULIN LISPRO 100 [IU]/ML
3-14 INJECTION, SOLUTION INTRAVENOUS; SUBCUTANEOUS
Status: DISCONTINUED | OUTPATIENT
Start: 2023-12-17 | End: 2023-12-20 | Stop reason: HOSPADM

## 2023-12-17 RX ORDER — DEXTROSE MONOHYDRATE 25 G/50ML
25 INJECTION, SOLUTION INTRAVENOUS
Status: DISCONTINUED | OUTPATIENT
Start: 2023-12-17 | End: 2023-12-20 | Stop reason: HOSPADM

## 2023-12-17 RX ORDER — IBUPROFEN 600 MG/1
1 TABLET ORAL
Status: DISCONTINUED | OUTPATIENT
Start: 2023-12-17 | End: 2023-12-20 | Stop reason: HOSPADM

## 2023-12-17 RX ORDER — INSULIN LISPRO 100 [IU]/ML
5 INJECTION, SOLUTION INTRAVENOUS; SUBCUTANEOUS
Status: DISCONTINUED | OUTPATIENT
Start: 2023-12-17 | End: 2023-12-20 | Stop reason: HOSPADM

## 2023-12-17 RX ORDER — NICOTINE POLACRILEX 4 MG
15 LOZENGE BUCCAL
Status: DISCONTINUED | OUTPATIENT
Start: 2023-12-17 | End: 2023-12-20 | Stop reason: HOSPADM

## 2023-12-17 RX ORDER — FLUTICASONE PROPIONATE 50 MCG
2 SPRAY, SUSPENSION (ML) NASAL DAILY
Status: DISCONTINUED | OUTPATIENT
Start: 2023-12-17 | End: 2023-12-20 | Stop reason: HOSPADM

## 2023-12-17 RX ORDER — POTASSIUM CHLORIDE 20 MEQ/1
20 TABLET, EXTENDED RELEASE ORAL ONCE
Status: COMPLETED | OUTPATIENT
Start: 2023-12-17 | End: 2023-12-17

## 2023-12-17 RX ADMIN — FAMOTIDINE 10 MG: 20 TABLET, FILM COATED ORAL at 08:10

## 2023-12-17 RX ADMIN — ASPIRIN 325 MG: 325 TABLET, COATED ORAL at 08:10

## 2023-12-17 RX ADMIN — Medication 10 ML: at 08:18

## 2023-12-17 RX ADMIN — SODIUM CHLORIDE 2 G: 900 INJECTION INTRAVENOUS at 05:45

## 2023-12-17 RX ADMIN — METOPROLOL TARTRATE 25 MG: 25 TABLET, FILM COATED ORAL at 21:34

## 2023-12-17 RX ADMIN — MONTELUKAST 10 MG: 10 TABLET, FILM COATED ORAL at 21:35

## 2023-12-17 RX ADMIN — INSULIN LISPRO 5 UNITS: 100 INJECTION, SOLUTION INTRAVENOUS; SUBCUTANEOUS at 17:39

## 2023-12-17 RX ADMIN — INSULIN LISPRO 3 UNITS: 100 INJECTION, SOLUTION INTRAVENOUS; SUBCUTANEOUS at 17:39

## 2023-12-17 RX ADMIN — SENNOSIDES AND DOCUSATE SODIUM 2 TABLET: 8.6; 5 TABLET ORAL at 08:10

## 2023-12-17 RX ADMIN — HYDROCODONE BITARTRATE AND ACETAMINOPHEN 1 TABLET: 7.5; 325 TABLET ORAL at 10:45

## 2023-12-17 RX ADMIN — ATORVASTATIN CALCIUM 40 MG: 40 TABLET, FILM COATED ORAL at 21:35

## 2023-12-17 RX ADMIN — POTASSIUM CHLORIDE 20 MEQ: 1500 TABLET, EXTENDED RELEASE ORAL at 08:10

## 2023-12-17 RX ADMIN — ACETAMINOPHEN 650 MG: 325 TABLET ORAL at 21:34

## 2023-12-17 RX ADMIN — ACETAMINOPHEN 650 MG: 325 TABLET ORAL at 14:14

## 2023-12-17 RX ADMIN — INSULIN DETEMIR 30 UNITS: 100 INJECTION, SOLUTION SUBCUTANEOUS at 15:14

## 2023-12-17 RX ADMIN — INSULIN LISPRO 8 UNITS: 100 INJECTION, SOLUTION INTRAVENOUS; SUBCUTANEOUS at 21:43

## 2023-12-17 RX ADMIN — SENNOSIDES AND DOCUSATE SODIUM 2 TABLET: 8.6; 5 TABLET ORAL at 21:34

## 2023-12-17 RX ADMIN — LEVOTHYROXINE SODIUM 137 MCG: 0.14 TABLET ORAL at 05:46

## 2023-12-17 RX ADMIN — Medication 12.5 MG: at 08:10

## 2023-12-17 RX ADMIN — PANTOPRAZOLE SODIUM 40 MG: 40 INJECTION, POWDER, LYOPHILIZED, FOR SOLUTION INTRAVENOUS at 05:45

## 2023-12-17 RX ADMIN — ACETAMINOPHEN 650 MG: 325 TABLET ORAL at 05:45

## 2023-12-17 RX ADMIN — TAMSULOSIN HYDROCHLORIDE 0.4 MG: 0.4 CAPSULE ORAL at 08:10

## 2023-12-17 RX ADMIN — Medication 10 ML: at 21:35

## 2023-12-17 RX ADMIN — FLUTICASONE PROPIONATE 2 SPRAY: 50 SPRAY, METERED NASAL at 08:17

## 2023-12-17 NOTE — PROGRESS NOTES
"INTENSIVIST NOTE     Hospital Day: 4    Mr. Joni Hoffman, 78 y.o. male is followed for:   Perioperative management of comorbid medical conditions including glycemic control       SUBJECTIVE     Interval history:    Oxygen weaned to 4 L nasal cannula.  Fluid balance +770 mL.  Maximum temperature 100.2.  Awake alert and oriented.    The patient's relevant past medical, surgical and social history were reviewed and updated in Epic as appropriate.       OBJECTIVE     Vital Sign Min/Max for last 24 hours  Temp  Min: 98.8 °F (37.1 °C)  Max: 100.2 °F (37.9 °C)   BP  Min: 93/51  Max: 138/58   Pulse  Min: 79  Max: 103   Resp  Min: 18  Max: 24   SpO2  Min: 91 %  Max: 99 %   No data recorded   No data recorded      Intake/Output Summary (Last 24 hours) at 12/17/2023 1438  Last data filed at 12/17/2023 0800  Gross per 24 hour   Intake 1669.7 ml   Output 900 ml   Net 769.7 ml      Flowsheet Rows      Flowsheet Row First Filed Value   Admission Height 167.6 cm (66\") Documented at 12/12/2023 1911   Admission Weight 93 kg (205 lb) Documented at 12/12/2023 1911               12/12/23  1911 12/13/23  1828 12/15/23  0622   Weight: 93 kg (205 lb) 93 kg (205 lb 0.4 oz) 96.7 kg (213 lb 3.2 oz)            Objective:  General Appearance:  In no acute distress.    Vital signs: (most recent): Blood pressure 125/52, pulse 87, temperature 98.8 °F (37.1 °C), temperature source Oral, resp. rate 18, height 167.6 cm (65.98\"), weight 96.7 kg (213 lb 3.2 oz), SpO2 96%.    HEENT: (Nasal cannula O2  Right internal jugular introducer)    Lungs:  Normal effort and normal respiratory rate.  He is not in respiratory distress.  There are decreased breath sounds.  No rales, wheezes or rhonchi.    Heart: Normal rate.  Regular rhythm.  S1 normal and S2 normal.  No murmur or gallop.   Chest: Symmetric chest wall expansion. (Median sternotomy.  Mediastinal tubes in place)  Abdomen: Abdomen is soft and non-distended.  Hypoactive bowel sounds.   There is no " mass. There is no splenomegaly. There is no hepatomegaly.   Extremities: There is no deformity or dependent edema.  (Right radial arterial line)  Neurological: Patient is alert.    Pupils:  Pupils are equal, round, and reactive to light.    Skin:  Warm and dry.                I reviewed the patient's new clinical results.  I reviewed the patient's new imaging results/reports including actual images and agree with reports.    XR Chest 1 View    Result Date: 12/17/2023  Impression: No significant interval change. Electronically Signed: Fady Araujo MD  12/17/2023 8:58 AM EST  Workstation ID: [id number]    XR Chest 1 View    Result Date: 12/16/2023  Impression: Mild bibasilar atelectasis. Electronically Signed: Marissa Mast MD  12/16/2023 7:34 AM CST  Workstation ID: [id number]    XR Chest 1 View    Result Date: 12/15/2023  Impression: Lines and tubes as characterized above. Please note that the enteric tube is noted with the distal tip in the stomach, although the sideport is at the level of the GE junction. Please consider advancing by 3 to 4 cm. Electronically Signed: Enrike Walters DO  12/15/2023 3:32 PM EST  Workstation ID: [id number]      INFUSIONS  dextrose 5 % with KCl 20 mEq, 30 mL/hr, Last Rate: 30 mL/hr (12/15/23 1448)  insulin, 0-100 Units/hr, Last Rate: 6.7 Units/hr (12/17/23 0906)  niCARdipine, 5-15 mg/hr, Last Rate: Stopped (12/16/23 0850)  norepinephrine, 0.02-0.3 mcg/kg/min, Last Rate: Stopped (12/16/23 0400)  phenylephrine, 0.5-3 mcg/kg/min        Results from last 7 days   Lab Units 12/17/23  0353 12/16/23  0304 12/15/23  1831   WBC 10*3/mm3 11.56* 12.78* 14.03*   HEMOGLOBIN g/dL 8.2* 9.2* 9.4*   HEMATOCRIT % 25.3* 28.5* 28.7*   PLATELETS 10*3/mm3 125* 165 176     Results from last 7 days   Lab Units 12/17/23  0353 12/16/23  0304 12/15/23  1831 12/15/23  1502   SODIUM mmol/L 134* 142 143 143   POTASSIUM mmol/L 3.7 4.3 4.0 3.5   CHLORIDE mmol/L 100 107 107 108*   CO2 mmol/L 24.0 23.0 24.0 25.0    BUN mg/dL 17 15 15 15   GLUCOSE mg/dL 139* 155* 181* 137*   CREATININE mg/dL 1.37* 1.62* 1.25 1.19   MAGNESIUM mg/dL  --  2.0 2.0 2.3   CALCIUM mg/dL 8.6 8.5* 8.8 8.8   ALBUMIN g/dL 3.5 4.2 4.4 3.9         Results from last 7 days   Lab Units 12/15/23  1903 12/15/23  1546   PH, ARTERIAL pH units 7.376 7.349*   PCO2, ARTERIAL mm Hg 40.8 44.8   PO2 ART mm Hg 73.7* 171.0*   FIO2 % 40 21       Patient isn't on Tube Feeding   /h  Patient doesn't have any tube feeding modular orders    Mechanical Ventilator:   Settings: Observed:   Mode: (S) PS (12/15/23 1834)    Vt (Set, mL): 510 mL (12/15/23 1716) Vt Mandatory Ins (observed, mL): 6000 mL (12/15/23 1910)   Resp Rate (Set): 16 (12/15/23 1716) Resp Rate (Observed) Vent: 23 (12/15/23 1910)   Pressure Support (cm H2O): 10 cm H20 (12/15/23 1910) Minute Ventilation (L/min) (Obs): 5.85 L/min (12/15/23 1910)       FiO2 (%): 40 % (12/15/23 1910)     PEEP/CPAP (cm H2O): 5 cm H20 (12/15/23 1910) I:E Ratio (Obs): 16.67:1 (12/15/23 1910)         I reviewed the patient's medications.    Assessment & Plan   ASSESSMENT/PLAN     Active Hospital Problems    Diagnosis     **NSTEMI (non-ST elevated myocardial infarction)     Nocturnal hypoxia     HLD (hyperlipidemia)     CAD (coronary artery disease)     Hypothyroidism (acquired)     T2DM (type 2 diabetes mellitus)     HTN (hypertension)        78-year-old male with a past medical history of CAD with prior stents, hypertension, dyslipidemia, type 2 diabetes mellitus, and hypothyroidism.  He underwent CABG x 3 on 12/15 performed by Dr. Darnell.     Oxygen weaned to 4 L from high flow nasal cannula.  GFR improved.  Remains on an insulin drip currently at 6.7 units/hour.  Mediastinal tubes to be removed today per CT surgery.    Plan:    Encourage pulmonary toilet  Wean FiO2 as tolerated  Continue to monitor GFR  Aspirin/statin  Transition insulin to subcutaneous.  I am not sure what his home insulin regimen is though I think it may be 30  units of 70/30 twice daily.  Hopefully to telemetry soon once chest tubes are out     I discussed the patient's findings and my recommendations with patient and nursing staff     Plan of care and goals reviewed with multidisciplinary team at daily rounds.    .    Myron Marr MD  Pulmonary and Critical Care Medicine  12/17/23 14:38 EST

## 2023-12-17 NOTE — PROGRESS NOTES
"CARDIOTHORACIC AND VASCULAR SURGERY PROGRESS NOTE    OVERNIGHT EVENTS  No acute events overnight      SUBJECTIVE  Pain controlled, denies nausea, vomiting, shortness of breath      OBJECTIVE  /51 (BP Location: Left arm, Patient Position: Lying)   Pulse 85   Temp 99 °F (37.2 °C) (Bladder)   Resp 20   Ht 167.6 cm (65.98\")   Wt 96.7 kg (213 lb 3.2 oz)   SpO2 95%   BMI 34.43 kg/m²   General no acute distress, pleasant, interactive  Head normocephalic, atraumatic  Eyes clear sclerae  ENT no discharge, neck supple  Mouth mucous membranes moist  Cardiac regular rate rhythm, no murmurs rubs gallops  Vascular warm well perfused x4 extremities  Pulmonary lungs clear to auscultation bilaterally  Abdomen soft nontender nondistended  Lymphatic no edema bilateral lower extremities  Neurological grossly intact  Psychological appropriate  Dermatological no lesions in sun exposed areas  Musculoskeletal normal tone and bulk    WBC   Date Value Ref Range Status   12/17/2023 11.56 (H) 3.40 - 10.80 10*3/mm3 Final     RBC   Date Value Ref Range Status   12/17/2023 2.65 (L) 4.14 - 5.80 10*6/mm3 Final     Hemoglobin   Date Value Ref Range Status   12/17/2023 8.2 (L) 13.0 - 17.7 g/dL Final     Hematocrit   Date Value Ref Range Status   12/17/2023 25.3 (L) 37.5 - 51.0 % Final     MCV   Date Value Ref Range Status   12/17/2023 95.5 79.0 - 97.0 fL Final     MCH   Date Value Ref Range Status   12/17/2023 30.9 26.6 - 33.0 pg Final     MCHC   Date Value Ref Range Status   12/17/2023 32.4 31.5 - 35.7 g/dL Final     RDW   Date Value Ref Range Status   12/17/2023 13.3 12.3 - 15.4 % Final     RDW-SD   Date Value Ref Range Status   12/17/2023 46.0 37.0 - 54.0 fl Final     MPV   Date Value Ref Range Status   12/17/2023 10.3 6.0 - 12.0 fL Final     Platelets   Date Value Ref Range Status   12/17/2023 125 (L) 140 - 450 10*3/mm3 Final     Neutrophil %   Date Value Ref Range Status   12/16/2023 82.9 (H) 42.7 - 76.0 % Final     Lymphocyte % " "  Date Value Ref Range Status   12/16/2023 5.8 (L) 19.6 - 45.3 % Final     Monocyte %   Date Value Ref Range Status   12/16/2023 10.4 5.0 - 12.0 % Final     Eosinophil %   Date Value Ref Range Status   12/16/2023 0.2 (L) 0.3 - 6.2 % Final     Basophil %   Date Value Ref Range Status   12/16/2023 0.2 0.0 - 1.5 % Final     Immature Grans %   Date Value Ref Range Status   12/16/2023 0.5 0.0 - 0.5 % Final     Neutrophils, Absolute   Date Value Ref Range Status   12/16/2023 10.60 (H) 1.70 - 7.00 10*3/mm3 Final     Lymphocytes, Absolute   Date Value Ref Range Status   12/16/2023 0.74 0.70 - 3.10 10*3/mm3 Final     Monocytes, Absolute   Date Value Ref Range Status   12/16/2023 1.33 (H) 0.10 - 0.90 10*3/mm3 Final     Eosinophils, Absolute   Date Value Ref Range Status   12/16/2023 0.02 0.00 - 0.40 10*3/mm3 Final     Basophils, Absolute   Date Value Ref Range Status   12/16/2023 0.03 0.00 - 0.20 10*3/mm3 Final     Immature Grans, Absolute   Date Value Ref Range Status   12/16/2023 0.06 (H) 0.00 - 0.05 10*3/mm3 Final     nRBC   Date Value Ref Range Status   12/16/2023 0.0 0.0 - 0.2 /100 WBC Final       Basic Metabolic Panel    Sodium Sodium   Date Value Ref Range Status   12/17/2023 134 (L) 136 - 145 mmol/L Final   12/16/2023 142 136 - 145 mmol/L Final   12/15/2023 143 136 - 145 mmol/L Final   12/15/2023 143 136 - 145 mmol/L Final      Potassium Potassium   Date Value Ref Range Status   12/17/2023 3.7 3.5 - 5.2 mmol/L Final   12/16/2023 4.3 3.5 - 5.2 mmol/L Final   12/15/2023 4.0 3.5 - 5.2 mmol/L Final   12/15/2023 3.5 3.5 - 5.2 mmol/L Final     Comment:     Slight hemolysis detected by analyzer. Result may be falsely elevated.      Chloride Chloride   Date Value Ref Range Status   12/17/2023 100 98 - 107 mmol/L Final   12/16/2023 107 98 - 107 mmol/L Final   12/15/2023 107 98 - 107 mmol/L Final   12/15/2023 108 (H) 98 - 107 mmol/L Final      Bicarbonate No results found for: \"PLASMABICARB\"   BUN BUN   Date Value Ref Range " "Status   12/17/2023 17 8 - 23 mg/dL Final   12/16/2023 15 8 - 23 mg/dL Final   12/15/2023 15 8 - 23 mg/dL Final   12/15/2023 15 8 - 23 mg/dL Final      Creatinine Creatinine   Date Value Ref Range Status   12/17/2023 1.37 (H) 0.76 - 1.27 mg/dL Final   12/16/2023 1.62 (H) 0.76 - 1.27 mg/dL Final   12/15/2023 1.25 0.76 - 1.27 mg/dL Final   12/15/2023 1.19 0.76 - 1.27 mg/dL Final      Calcium Calcium   Date Value Ref Range Status   12/17/2023 8.6 8.6 - 10.5 mg/dL Final   12/16/2023 8.5 (L) 8.6 - 10.5 mg/dL Final   12/15/2023 8.8 8.6 - 10.5 mg/dL Final   12/15/2023 8.8 8.6 - 10.5 mg/dL Final      Glucose      No components found for: \"GLUCOSE.*\"         Scheduled Meds:acetaminophen, 650 mg, Oral, Q8H  aspirin, 325 mg, Oral, Daily  atorvastatin, 40 mg, Oral, Nightly  famotidine, 10 mg, Oral, Daily  fluticasone, 2 spray, Each Nare, Daily  levothyroxine, 137 mcg, Oral, Q AM  metoprolol tartrate, 12.5 mg, Oral, Q12H  montelukast, 10 mg, Oral, Nightly  pantoprazole, 40 mg, Intravenous, Q AM  pharmacy consult - [place], , Does not apply, Daily  senna-docusate sodium, 2 tablet, Oral, BID  sodium chloride, 10 mL, Intravenous, Q12H  tamsulosin, 0.4 mg, Oral, Daily      Continuous Infusions:dextrose 5 % with KCl 20 mEq, 30 mL/hr, Last Rate: 30 mL/hr (12/15/23 1448)  insulin, 0-100 Units/hr, Last Rate: 2.8 Units/hr (12/17/23 0524)  niCARdipine, 5-15 mg/hr, Last Rate: Stopped (12/16/23 0850)  norepinephrine, 0.02-0.3 mcg/kg/min, Last Rate: Stopped (12/16/23 0400)  phenylephrine, 0.5-3 mcg/kg/min      PRN Meds:.  acetaminophen    albumin human    senna-docusate sodium **AND** polyethylene glycol **AND** bisacodyl **AND** bisacodyl    Calcium Replacement - Follow Nurse / BPA Driven Protocol    HYDROcodone-acetaminophen    ipratropium-albuterol    Magnesium Cardiology Dose Replacement - Follow Nurse / BPA Driven Protocol    Morphine    niCARdipine    nitroglycerin    nitroglycerin    norepinephrine    ondansetron    oxyCODONE    " phenylephrine    Phosphorus Replacement - Follow Nurse / BPA Driven Protocol    Potassium Replacement - Follow Nurse / BPA Driven Protocol    sodium chloride    sodium chloride    ASSESSMENT  78-year-old man with history of diabetes and hypertension who presented with NSTEMI and subsequently underwent repair of coronary arteriovenous fistula, CABG x 3 on December 15, 2023 by Dr. Darnell, uncomplicated.  Progressing as expected.       NSTEMI (non-ST elevated myocardial infarction)    HTN (hypertension)    CAD (coronary artery disease)    Hypothyroidism (acquired)    T2DM (type 2 diabetes mellitus)    Nocturnal hypoxia    HLD (hyperlipidemia)        PLAN  Appreciate ICU and cardiology input and medical optimization.  Discontinue chest tubes (less than 300 mL out in 24 hours).  Diuresis.  Can consider transfer to telemetry [place] if continues to improve.    Kev Frost M.D., R.P.V.I.  Cardiothoracic and Vascular Surgeon  [facility]    Kev Frost MD  12/17/23  08:35 EST

## 2023-12-17 NOTE — PROGRESS NOTES
"      Cardiac Electrophysiology Inpatient Follow Up Note         [facility]    Progress Note    INITIAL REASON FOR CONSULT: Hypertension    CHIEF COMPLAINT:  Chief Complaint   Patient presents with    Chest Pain     Hypertension    Subjective   Mr. Joni Hoffman denies vomiting, abdominal pain, and fussiness.        Objective   VITAL SIGNS  /52 (BP Location: Left arm, Patient Position: Lying)   Pulse 98   Temp 99 °F (37.2 °C) (Oral)   Resp 22   Ht 167.6 cm (65.98\")   Wt 96.7 kg (213 lb 3.2 oz)   SpO2 97%   BMI 34.43 kg/m²   [unfilled]  Pulse Ox: SpO2  Av.7 %  Min: 91 %  Max: 99 %  Supplemental O2:       Admit Weight  Weight: 93 kg (205 lb)  Last 3 Weights  [unfilled]  Body mass index is 34.43 kg/m².    INTAKE/OUTPUT  I/O last 3 completed shifts:  In: 3358.7 [I.V.:2908.7; IV Piggyback:450]  Out: 2195 [Urine:1715; Chest Tube:480]    Intake/Output Summary (Last 24 hours) at 2023 1647  Last data filed at 2023 1400  Gross per 24 hour   Intake 1669.7 ml   Output 1400 ml   Net 269.7 ml       PHYSICAL EXAM  General appearance: awake, alert, oriented, moves all extremities  Lungs: no rhonchi, no wheezes, no rales  Heart: Regular rate and rhythm  Abdomen: positive bowel sounds, no bruits, no masses  Extremities: warm and dry, no cyanosis, no clubbing    LABS  Results from last 7 days   Lab Units 23  0353 23  0304 12/15/23  1831   WBC 10*3/mm3 11.56* 12.78* 14.03*   HEMOGLOBIN g/dL 8.2* 9.2* 9.4*   HEMATOCRIT % 25.3* 28.5* 28.7*   MCV fL 95.5 96.6 95.3   PLATELETS 10*3/mm3 125* 165 176         Results from last 7 days   Lab Units 23  1437 23  0353 23  0304 12/15/23  1831 12/15/23  1502   POTASSIUM mmol/L 4.2 3.7 4.3 4.0 3.5   CHLORIDE mmol/L  --  100 107 107 108*   CO2 mmol/L  --  24.0 23.0 24.0 25.0   BUN mg/dL  --  17 15 15 15   CREATININE mg/dL  --  1.37* 1.62* 1.25 1.19   GLUCOSE mg/dL  --  139* 155* 181* 137*   CALCIUM " "mg/dL  --  8.6 8.5* 8.8 8.8   MAGNESIUM mg/dL  --   --  2.0 2.0 2.3     Results from last 7 days   Lab Units 12/16/23  0304 12/15/23  1502 12/12/23  1920   PROTIME Seconds 15.4* 15.2* 12.5   INR  1.20* 1.19* 0.93         Results from last 7 days   Lab Units 12/16/23  0304 12/15/23  1831 12/15/23  1502   MAGNESIUM mg/dL 2.0 2.0 2.3                 No results found for: \"CHOL\", \"TRIG\", \"HDL\"    CURRENT MEDICATIONS  acetaminophen, 650 mg, Oral, Q8H  aspirin, 325 mg, Oral, Daily  atorvastatin, 40 mg, Oral, Nightly  famotidine, 10 mg, Oral, Daily  fluticasone, 2 spray, Each Nare, Daily  insulin detemir, 30 Units, Subcutaneous, Daily  insulin lispro, 3-14 Units, Subcutaneous, 4x Daily AC & at Bedtime  Insulin Lispro, 5 Units, Subcutaneous, TID With Meals  levothyroxine, 137 mcg, Oral, Q AM  metoprolol tartrate, 25 mg, Oral, Q12H  montelukast, 10 mg, Oral, Nightly  pantoprazole, 40 mg, Intravenous, Q AM  pharmacy consult - [facility], , Does not apply, Daily  senna-docusate sodium, 2 tablet, Oral, BID  sodium chloride, 10 mL, Intravenous, Q12H  tamsulosin, 0.4 mg, Oral, Daily                  Diagnosis Plan    1. NSTEMI (non-ST elevated myocardial infarction)  POD# 1 CABG  Per CTS   3. History of hypertension  Increase metoprolol to 25 twice daily.     Body mass index is 34.43 kg/m².    I spent 28 minutes in consultation with this patient which included more than 65% of this time in direct face-to-face counseling, physical examination and discussion of my assessment and findings and this shared decision making with the patient.  The remainder of the time not spent face-to-face was performing one, some or all of the following actions: preparing to see the patient (e.g. reviewing tests, prior clinicians' notes, etc), ordering medications, tests or procedures, coordination of care, discussion of the plan with other healthcare providers, documenting clinical information in epic as well as independently interpreting results and " communication of these results to the patient family and/or caregiver(s).  Please note that this explicitly excludes time spent on other separate billable services such as performing procedures or test interpretation, when applicable.        Christian Vance DO, FACC, [facility]  Cardiac Electrophysiologist  [facility]

## 2023-12-18 LAB
ANION GAP SERPL CALCULATED.3IONS-SCNC: 9 MMOL/L (ref 5–15)
BASOPHILS # BLD AUTO: 0.04 10*3/MM3 (ref 0–0.2)
BASOPHILS NFR BLD AUTO: 0.4 % (ref 0–1.5)
BUN SERPL-MCNC: 23 MG/DL (ref 8–23)
BUN/CREAT SERPL: 21.7 (ref 7–25)
CALCIUM SPEC-SCNC: 8.3 MG/DL (ref 8.6–10.5)
CHLORIDE SERPL-SCNC: 103 MMOL/L (ref 98–107)
CO2 SERPL-SCNC: 25 MMOL/L (ref 22–29)
CREAT SERPL-MCNC: 1.06 MG/DL (ref 0.76–1.27)
DEPRECATED RDW RBC AUTO: 46.4 FL (ref 37–54)
EGFRCR SERPLBLD CKD-EPI 2021: 71.8 ML/MIN/1.73
EOSINOPHIL # BLD AUTO: 0.21 10*3/MM3 (ref 0–0.4)
EOSINOPHIL NFR BLD AUTO: 2.2 % (ref 0.3–6.2)
ERYTHROCYTE [DISTWIDTH] IN BLOOD BY AUTOMATED COUNT: 13.2 % (ref 12.3–15.4)
GLUCOSE BLDC GLUCOMTR-MCNC: 173 MG/DL (ref 70–130)
GLUCOSE BLDC GLUCOMTR-MCNC: 208 MG/DL (ref 70–130)
GLUCOSE BLDC GLUCOMTR-MCNC: 241 MG/DL (ref 70–130)
GLUCOSE BLDC GLUCOMTR-MCNC: 262 MG/DL (ref 70–130)
GLUCOSE SERPL-MCNC: 210 MG/DL (ref 65–99)
HCT VFR BLD AUTO: 24.7 % (ref 37.5–51)
HGB BLD-MCNC: 8.1 G/DL (ref 13–17.7)
IMM GRANULOCYTES # BLD AUTO: 0.03 10*3/MM3 (ref 0–0.05)
IMM GRANULOCYTES NFR BLD AUTO: 0.3 % (ref 0–0.5)
LYMPHOCYTES # BLD AUTO: 1.09 10*3/MM3 (ref 0.7–3.1)
LYMPHOCYTES NFR BLD AUTO: 11.2 % (ref 19.6–45.3)
MCH RBC QN AUTO: 31.5 PG (ref 26.6–33)
MCHC RBC AUTO-ENTMCNC: 32.8 G/DL (ref 31.5–35.7)
MCV RBC AUTO: 96.1 FL (ref 79–97)
MONOCYTES # BLD AUTO: 1.11 10*3/MM3 (ref 0.1–0.9)
MONOCYTES NFR BLD AUTO: 11.4 % (ref 5–12)
NEUTROPHILS NFR BLD AUTO: 7.25 10*3/MM3 (ref 1.7–7)
NEUTROPHILS NFR BLD AUTO: 74.5 % (ref 42.7–76)
NRBC BLD AUTO-RTO: 0 /100 WBC (ref 0–0.2)
PLATELET # BLD AUTO: 147 10*3/MM3 (ref 140–450)
PMV BLD AUTO: 10.7 FL (ref 6–12)
POTASSIUM SERPL-SCNC: 4.1 MMOL/L (ref 3.5–5.2)
RBC # BLD AUTO: 2.57 10*6/MM3 (ref 4.14–5.8)
SODIUM SERPL-SCNC: 137 MMOL/L (ref 136–145)
WBC NRBC COR # BLD AUTO: 9.73 10*3/MM3 (ref 3.4–10.8)

## 2023-12-18 PROCEDURE — 63710000001 INSULIN LISPRO (HUMAN) PER 5 UNITS: Performed by: STUDENT IN AN ORGANIZED HEALTH CARE EDUCATION/TRAINING PROGRAM

## 2023-12-18 PROCEDURE — 63710000001 INSULIN LISPRO (HUMAN) PER 5 UNITS: Performed by: INTERNAL MEDICINE

## 2023-12-18 PROCEDURE — 82948 REAGENT STRIP/BLOOD GLUCOSE: CPT

## 2023-12-18 PROCEDURE — 85025 COMPLETE CBC W/AUTO DIFF WBC: CPT | Performed by: INTERNAL MEDICINE

## 2023-12-18 PROCEDURE — 63710000001 INSULIN DETEMIR PER 5 UNITS: Performed by: INTERNAL MEDICINE

## 2023-12-18 PROCEDURE — 97530 THERAPEUTIC ACTIVITIES: CPT

## 2023-12-18 PROCEDURE — 80048 BASIC METABOLIC PNL TOTAL CA: CPT | Performed by: PHYSICIAN ASSISTANT

## 2023-12-18 PROCEDURE — 99024 POSTOP FOLLOW-UP VISIT: CPT | Performed by: THORACIC SURGERY (CARDIOTHORACIC VASCULAR SURGERY)

## 2023-12-18 RX ORDER — ASPIRIN 81 MG/1
81 TABLET ORAL DAILY
Status: DISCONTINUED | OUTPATIENT
Start: 2023-12-19 | End: 2023-12-20 | Stop reason: HOSPADM

## 2023-12-18 RX ORDER — CLOPIDOGREL BISULFATE 75 MG/1
75 TABLET ORAL DAILY
Status: DISCONTINUED | OUTPATIENT
Start: 2023-12-18 | End: 2023-12-20 | Stop reason: HOSPADM

## 2023-12-18 RX ORDER — SODIUM CHLORIDE 0.9 % (FLUSH) 0.9 %
10 SYRINGE (ML) INJECTION EVERY 12 HOURS SCHEDULED
Status: DISCONTINUED | OUTPATIENT
Start: 2023-12-18 | End: 2023-12-20 | Stop reason: HOSPADM

## 2023-12-18 RX ORDER — SODIUM CHLORIDE 9 MG/ML
40 INJECTION, SOLUTION INTRAVENOUS AS NEEDED
Status: DISCONTINUED | OUTPATIENT
Start: 2023-12-18 | End: 2023-12-18 | Stop reason: SDUPTHER

## 2023-12-18 RX ORDER — SODIUM CHLORIDE 0.9 % (FLUSH) 0.9 %
10 SYRINGE (ML) INJECTION AS NEEDED
Status: DISCONTINUED | OUTPATIENT
Start: 2023-12-18 | End: 2023-12-20 | Stop reason: HOSPADM

## 2023-12-18 RX ADMIN — METOPROLOL TARTRATE 25 MG: 25 TABLET, FILM COATED ORAL at 16:58

## 2023-12-18 RX ADMIN — ASPIRIN 325 MG: 325 TABLET, COATED ORAL at 08:41

## 2023-12-18 RX ADMIN — FLUTICASONE PROPIONATE 2 SPRAY: 50 SPRAY, METERED NASAL at 08:43

## 2023-12-18 RX ADMIN — INSULIN LISPRO 3 UNITS: 100 INJECTION, SOLUTION INTRAVENOUS; SUBCUTANEOUS at 16:59

## 2023-12-18 RX ADMIN — INSULIN LISPRO 5 UNITS: 100 INJECTION, SOLUTION INTRAVENOUS; SUBCUTANEOUS at 08:41

## 2023-12-18 RX ADMIN — INSULIN DETEMIR 30 UNITS: 100 INJECTION, SOLUTION SUBCUTANEOUS at 08:41

## 2023-12-18 RX ADMIN — MONTELUKAST 10 MG: 10 TABLET, FILM COATED ORAL at 20:33

## 2023-12-18 RX ADMIN — PANTOPRAZOLE SODIUM 40 MG: 40 INJECTION, POWDER, LYOPHILIZED, FOR SOLUTION INTRAVENOUS at 06:00

## 2023-12-18 RX ADMIN — ACETAMINOPHEN 650 MG: 325 TABLET ORAL at 20:33

## 2023-12-18 RX ADMIN — Medication 10 ML: at 08:43

## 2023-12-18 RX ADMIN — METOPROLOL TARTRATE 25 MG: 25 TABLET, FILM COATED ORAL at 23:38

## 2023-12-18 RX ADMIN — CLOPIDOGREL BISULFATE 75 MG: 75 TABLET ORAL at 08:42

## 2023-12-18 RX ADMIN — LEVOTHYROXINE SODIUM 137 MCG: 0.14 TABLET ORAL at 06:00

## 2023-12-18 RX ADMIN — ACETAMINOPHEN 650 MG: 325 TABLET ORAL at 06:00

## 2023-12-18 RX ADMIN — INSULIN LISPRO 8 UNITS: 100 INJECTION, SOLUTION INTRAVENOUS; SUBCUTANEOUS at 11:31

## 2023-12-18 RX ADMIN — Medication 10 ML: at 20:58

## 2023-12-18 RX ADMIN — INSULIN LISPRO 5 UNITS: 100 INJECTION, SOLUTION INTRAVENOUS; SUBCUTANEOUS at 17:00

## 2023-12-18 RX ADMIN — TAMSULOSIN HYDROCHLORIDE 0.4 MG: 0.4 CAPSULE ORAL at 08:41

## 2023-12-18 RX ADMIN — ATORVASTATIN CALCIUM 40 MG: 40 TABLET, FILM COATED ORAL at 20:33

## 2023-12-18 RX ADMIN — Medication 10 ML: at 11:32

## 2023-12-18 RX ADMIN — INSULIN LISPRO 5 UNITS: 100 INJECTION, SOLUTION INTRAVENOUS; SUBCUTANEOUS at 21:29

## 2023-12-18 RX ADMIN — FAMOTIDINE 10 MG: 20 TABLET, FILM COATED ORAL at 08:42

## 2023-12-18 RX ADMIN — SENNOSIDES AND DOCUSATE SODIUM 2 TABLET: 8.6; 5 TABLET ORAL at 08:42

## 2023-12-18 RX ADMIN — INSULIN LISPRO 5 UNITS: 100 INJECTION, SOLUTION INTRAVENOUS; SUBCUTANEOUS at 11:32

## 2023-12-18 RX ADMIN — ACETAMINOPHEN 650 MG: 325 TABLET ORAL at 14:08

## 2023-12-18 RX ADMIN — METOPROLOL TARTRATE 25 MG: 25 TABLET, FILM COATED ORAL at 08:41

## 2023-12-18 NOTE — NURSING NOTE
"                             Wound, Ostomy and Continence (WOC) Note    Reason for WOC Consultation: Pressure injury, possible DTI to the right thigh versus skin tear    Patient awake, sitting up in chair.  Family/support person not present.     Skin/Wound Assessment:     No deep tissue pressure injury noted.  Patient has bruising at the right anterior and medial thigh with skin tear likely related to adhesive material.  Treat skin tear per [facility] protocol.  Cleanse with normal saline and 4 x 4 gauze.  Apply multilayer Xeroform to the open skin tears and cover with an Optifoam dressing.  Manage every 3 days.    Image:     Summary and Recommendation(s):     - Refer to wound care instructions in the \"Orders\" tab  - Specialty support surface in place: Isolibrium       Pressure Injury Prevention Measures (initiate for a Sudarshan Scale Score of <18):     Most recent Sudarshan Scale score:  Sensory Perception: 4-->no impairment  Moisture: 4-->rarely moist  Activity: 3-->walks occasionally  Mobility: 3-->slightly limited  Nutrition: 3-->adequate  Friction and Shear: 3-->no apparent problem  Sudarshan Score: 20 (12/18/23 0800)     -Turn q 2 hr. using an offloading foam wedge, keep heels elevated and offloaded with offloading heel boots.    -Raise knee-gatch before elevating HOB to reduce shearing   -Follow C.A.R.E protocol if medical devices (Bipap, her, Ng tube, etc) are being used.  -Apply moisture barrier cream to bottom BID & PRN, if incontinent.  -Clean skin gently with no-rinse PH-balanced foam cleanser and a soft, disposable cloth (barrier wipes-blue pack).   -Reduce layers under patient (one sheet as drawsheet and two incontinence pads)    Thank you for consulting the WOC Nurse.  WOC Team will sign off.  Please re consult if the wound(s) worsens.     Mina Clark RN, BSN, CCRN, CWOCN  Wound, Ostomy and Continence (WOC) Department  [facility]          "

## 2023-12-18 NOTE — CASE MANAGEMENT/SOCIAL WORK
Continued Stay Note  [facility]     Patient Name: Joni Hoffman  MRN: [id number]  Today's Date: 12/18/2023    Admit Date: 12/12/2023    Plan: Home   Discharge Plan       Row Name 12/18/23 1039       Plan    Plan Home    Patient/Family in Agreement with Plan yes    Plan Comments Spoke with patient at bedside. Per pt requires O2 at HS not home requirement. Denies any other discharge needs. CM will cont to follow.    Final Discharge Disposition Code 01 - home or self-care                   Discharge Codes    No documentation.                 Expected Discharge Date and Time       Expected Discharge Date Expected Discharge Time    Dec 20, 2023               Melva Gonzales RN

## 2023-12-18 NOTE — PLAN OF CARE
Goal Outcome Evaluation:  Plan of Care Reviewed With: patient        Progress: improving  Outcome Evaluation: Pt continues to present with increased SOA with activity and decreased endurance with activity. Pt ambulated 220ft with CGA and BUE support. Pt required increased assist for STS and to improve sequencing of AD at times. Continue to progress per pt tolerance.      Anticipated Discharge Disposition (PT): home with assist

## 2023-12-18 NOTE — PROGRESS NOTES
CTS Progress Note       LOS: 5 days   Patient Care Team:  Little Barker MD as PCP - General (Internal Medicine)  Little Barker MD as Referring Physician (Internal Medicine)  Bry Dill MD as Consulting Physician (Cardiology)    Chief Complaint: NSTEMI (non-ST elevated myocardial infarction)    Vital Signs:  Temp:  [98.2 °F (36.8 °C)-99.8 °F (37.7 °C)] 99.1 °F (37.3 °C)  Heart Rate:  [] 106  Resp:  [18-26] 24  BP: (110-153)/() 122/94    Physical Exam: Alert conversant breathing unlabored sternum is stable       Results:     Results from last 7 days   Lab Units 12/18/23  0441   WBC 10*3/mm3 9.73   HEMOGLOBIN g/dL 8.1*   HEMATOCRIT % 24.7*   PLATELETS 10*3/mm3 147     Results from last 7 days   Lab Units 12/18/23  0441   SODIUM mmol/L 137   POTASSIUM mmol/L 4.1   CHLORIDE mmol/L 103   CO2 mmol/L 25.0   BUN mg/dL 23   CREATININE mg/dL 1.06   GLUCOSE mg/dL 210*   CALCIUM mg/dL 8.3*           Imaging Results (Last 24 Hours)       Procedure Component Value Units Date/Time    XR Chest 1 View [[id number]] Collected: 12/17/23 0857     Updated: 12/17/23 0902    Narrative:      XR CHEST 1 VW    Date of Exam: 12/17/2023 2:36 AM EST    Indication: Post-Op Heart Surgery    Comparison: Chest radiograph 12/16/2023    Findings:  Sternotomy and thoracotomy drains. Right IJ central catheter in stable position. Cardiomediastinal silhouette similar to prior exam. Lung volumes are low with similar bibasilar airspace opacities, presumed atelectasis. No large effusion. Blunted   costophrenic angles which could relate to trace effusions and would be similar to prior. No pneumothorax. No worsening edema or new infiltrate.      Impression:      Impression:  No significant interval change.      Electronically Signed: Fady Araujo MD    12/17/2023 8:58 AM EST    Workstation ID: [id number]            Assessment      NSTEMI (non-ST elevated myocardial infarction)    HTN (hypertension)    CAD (coronary artery  disease)    Hypothyroidism (acquired)    T2DM (type 2 diabetes mellitus)    Nocturnal hypoxia    HLD (hyperlipidemia)        Plan   Plavix 75 mg p.o. today and daily thereafter  Transfer for to telemetry  Discontinue narcotics at transfer    Please note that portions of this note were completed with a voice recognition program. Efforts were made to edit the dictations, but occasionally words are mistranscribed.    Adam Darnell MD  12/18/23  06:20 EST

## 2023-12-18 NOTE — PROGRESS NOTES
" [facility] - Progress Note    Joni Hoffman  1945  S254/1    12/18/23, 08:15 EST      Chief Complaint: Following for NSTEMI    Subjective: Up in chair with breakfast.  No chest pain or dyspnea      Review of Systems:  Pertinent positives are listed above and in physical exam.  All others have been reviewed and are negative.    acetaminophen, 650 mg, Oral, Q8H  aspirin, 325 mg, Oral, Daily  atorvastatin, 40 mg, Oral, Nightly  clopidogrel, 75 mg, Oral, Daily  famotidine, 10 mg, Oral, Daily  fluticasone, 2 spray, Each Nare, Daily  insulin detemir, 30 Units, Subcutaneous, Daily  insulin lispro, 3-14 Units, Subcutaneous, 4x Daily AC & at Bedtime  Insulin Lispro, 5 Units, Subcutaneous, TID With Meals  levothyroxine, 137 mcg, Oral, Q AM  metoprolol tartrate, 25 mg, Oral, Q12H  montelukast, 10 mg, Oral, Nightly  pantoprazole, 40 mg, Intravenous, Q AM  pharmacy consult - MTM, , Does not apply, Daily  senna-docusate sodium, 2 tablet, Oral, BID  sodium chloride, 10 mL, Intravenous, Q12H  tamsulosin, 0.4 mg, Oral, Daily        Objective:  Vitals:   height is 167.6 cm (65.98\") and weight is 96.7 kg (213 lb 3.2 oz). His oral temperature is 99.1 °F (37.3 °C). His blood pressure is 128/58 and his pulse is 100. His respiration is 24 and oxygen saturation is 92%.     Intake/Output Summary (Last 24 hours) at 12/18/2023 0810  Last data filed at 12/18/2023 0600  Gross per 24 hour   Intake 525 ml   Output 2600 ml   Net -2075 ml       Physical Exam:  General:  WN, NAD, A and O x3.  CV:  Normal S1,S2. No murmur, rub, or gallop.  Resp:  CTA Behzad, equal, nonlabored.  Abd:  Soft, + BS, no organomegaly. Nontender to palpation.  Extrem:  No edema BLE, 2+ pedal/PT pulses.            Results from last 7 days   Lab Units 12/18/23  0441   WBC 10*3/mm3 9.73   HEMOGLOBIN g/dL 8.1*   HEMATOCRIT % 24.7*   PLATELETS 10*3/mm3 147     Results from last 7 days   Lab Units 12/18/23  0441 12/17/23  1437 12/17/23  0353   SODIUM mmol/L " 137  --  134*   POTASSIUM mmol/L 4.1   < > 3.7   CHLORIDE mmol/L 103  --  100   CO2 mmol/L 25.0  --  24.0   BUN mg/dL 23  --  17   CREATININE mg/dL 1.06  --  1.37*   CALCIUM mg/dL 8.3*  --  8.6   BILIRUBIN mg/dL  --   --  0.4   ALK PHOS U/L  --   --  45   ALT (SGPT) U/L  --   --  11   AST (SGOT) U/L  --   --  33   GLUCOSE mg/dL 210*  --  139*    < > = values in this interval not displayed.     Results from last 7 days   Lab Units 12/16/23  0304 12/15/23  1502 12/12/23  1920   INR  1.20* 1.19* 0.93     Results from last 7 days   Lab Units 12/13/23  0331   TSH uIU/mL 2.640     Results from last 7 days   Lab Units 12/13/23  0331   CHOLESTEROL mg/dL 103   TRIGLYCERIDES mg/dL 122   HDL CHOL mg/dL 34*   LDL CHOL mg/dL 47     Results from last 7 days   Lab Units 12/12/23  1920   PROBNP pg/mL 733.6       Tele:  NSR      Assessment:  -78-year-old CM with past medical history of CAD, HTN, HLP, DM 2 presents to local hospital after 2 to 3-day history of exertional chest pain and dyspnea with normal EKG, elevated cardiac markers.  Repeat serial troponins here at [facility] are also elevated, findings consistent with NSTEMI. MV CAD by [facility] 12/13/23.  CABG 12/15/2023  -CAD, remote stenting  -HTN  -HLP  -DM 2, A1C  7  -Hypothyroidism on chronic supplementation  -BPH           Plan:  -Admitted to hospitalist services.    - Plan for possible CABG today, Dr. Darnell.  CTS following.  -Continue low-dose aspirin.  He is on Repatha at home.  Patient did receive loading dose Plavix 300 mg  -  Continue Toprol XL.  Hold off on ACE/ARB/ARNI pre operative and will follow closely post operatively.  -Continue Synthroid  -Increase metoprolol dose  -Cardiology will follow post operatively      Magan Mcmanus MD

## 2023-12-18 NOTE — NURSING NOTE
Prior to central line removal, order for the removal of catheter was verified, patient was assessed, necessary materials were gathered and patient was educated regarding procedure .    Patient was positioned supine to ensure that the insertion site was at or below the level of the heart.    Hands were washed, clean gloves were applied and central line dressing was gently removed. Catheter exit site was not cultured.     A new pair of clean gloves were then applied. Insertion site was cleansed with 2% Chlorhexidine swab using a circular motion beginning at the insertion site and moving outward for 30 seconds and allowed to dry.     Clamp on line was present and clamped.     Patient was instructed to hold breath as catheter was withdrawn.     The central line was grasped at the insertion site and slowly pulled outward parallel to the skin. Resistance was not met.    After central line was completely removed, a sterile 4x4 gauze pad was used to apply light pressure until bleeding stopped. At that time, petroleum-based gauze and a sterile occlusive dressing was applied to exit site.     Patient was instructed to keep dressing in place for at least 24 hours and to remain in a flat or reclining position for 30 minutes post-removal.     Catheter was inspected after removal and was intact. Tip of central line was not sent for culture. Patient tolerated procedure.

## 2023-12-18 NOTE — PROGRESS NOTES
"INTENSIVIST   PROGRESS NOTE     Hospital:  LOS: 5 days     Chief Complaint: Postoperative management    Subjective   S     Interval History: No acute issues overnight.  Patient afebrile.  Hemodynamically stable.    The patient's relevant past medical, surgical and social history were reviewed and updated in Epic as appropriate.      Objective   O     Intake/Ouptut 24 hrs (7:00AM - 6:59 AM)  Intake & Output (last 3 days)         12/15 0701 12/16 0700 12/16 0701 12/17 0700 12/17 0701 12/18 0700 12/18 0701 12/19 0700    P.O.   525 250    I.V. (mL/kg) 1853 (19.2) 1569.7 (16.2)      Blood 672       IV Piggyback 1577 100      Total Intake(mL/kg) 4102 (42.4) 1669.7 (17.3) 525 (5.4) 250 (2.6)    Urine (mL/kg/hr) 3235 (1.4) 1005 (0.4) 2550 (1.1)     Chest Tube 890 260 90     Total Output 4125 1265 2640     Net -23 +404.7 -2115 +250                  Medications (drips):  dextrose 5 % with KCl 20 mEq, Last Rate: Stopped (12/18/23 0700)  niCARdipine, Last Rate: Stopped (12/16/23 0850)  norepinephrine, Last Rate: Stopped (12/16/23 0400)  phenylephrine    Physical Examination:  Vital Signs: Blood pressure 123/69, pulse 112, temperature 99.2 °F (37.3 °C), resp. rate 24, height 167.6 cm (65.98\"), weight 96.7 kg (213 lb 3.2 oz), SpO2 95%.    General: The patient appears in no acute distress. Alert, cooperative and interactive.  Chest: Clear to auscultation bilaterally, No wheezing, rhonchi, or rales. Normal work of breathing. Equal chest rise.  Cardiac:S1S2 auscultated. No murmurs, rubs or gallops.   Abdomen: Soft, non-tender, non-distended, positive bowel sounds in all four quadrants.   Extremities: No lower extremity edema. No clubbing or cyanosis.  Skin: Surgical site appears well-healing without bleeding, erythema or drainage.  Neuro: Motor power grossly intact bilaterally. Sensation intact. Speech fluid and fluent. Thought process coherent.  Psych: Alert and oriented x3. Mood stable.    Lines, Drains & Airways       Active " LDAs       Name Placement date Placement time Site Days    Peripheral IV 12/18/23 0400 Anterior;Right Forearm 12/18/23  0400  Forearm  less than 1             Results from last 7 days   Lab Units 12/18/23  0441 12/17/23 0353 12/16/23  0304   WBC 10*3/mm3 9.73 11.56* 12.78*   HEMOGLOBIN g/dL 8.1* 8.2* 9.2*   MCV fL 96.1 95.5 96.6   PLATELETS 10*3/mm3 147 125* 165     Results from last 7 days   Lab Units 12/18/23  0441 12/17/23  1437 12/17/23  0353 12/16/23  0304 12/15/23  1831 12/15/23  1502   SODIUM mmol/L 137  --  134* 142 143 143   POTASSIUM mmol/L 4.1 4.2 3.7 4.3 4.0 3.5   CO2 mmol/L 25.0  --  24.0 23.0 24.0 25.0   CREATININE mg/dL 1.06  --  1.37* 1.62* 1.25 1.19   GLUCOSE mg/dL 210*  --  139* 155* 181* 137*   MAGNESIUM mg/dL  --   --   --  2.0 2.0 2.3   PHOSPHORUS mg/dL  --   --   --   --  2.9 2.7     Estimated Creatinine Clearance: 62.6 mL/min (by C-G formula based on SCr of 1.06 mg/dL).  Results from last 7 days   Lab Units 12/17/23  0353 12/16/23  0304 12/12/23  1920   ALK PHOS U/L 45 43 72   BILIRUBIN mg/dL 0.4 0.2 0.2   ALT (SGPT) U/L 11 21 18   AST (SGOT) U/L 33 48* 20     Results from last 7 days   Lab Units 12/15/23  1903 12/15/23  1546   PH, ARTERIAL pH units 7.376 7.349*   PCO2, ARTERIAL mm Hg 40.8 44.8   PO2 ART mm Hg 73.7* 171.0*   FIO2 % 40 21     Results: Reviewed.  I reviewed the patient's new laboratory and imaging results.  I independently reviewed the patient's new images.    Medications: Reviewed.    Assessment & Plan    A / P     Active Hospital Problems    Diagnosis     **NSTEMI (non-ST elevated myocardial infarction)     Nocturnal hypoxia     HLD (hyperlipidemia)     CAD (coronary artery disease)     Hypothyroidism (acquired)     T2DM (type 2 diabetes mellitus)     HTN (hypertension)      Patient Dalton is a 78-year-old male with a past medical history of CAD with prior stents, hypertension, dyslipidemia, type 2 diabetes mellitus, and hypothyroidism.  He underwent CABG x 3 on 12/15  performed by Dr. Darnell.     -Postoperative orders and chest tube management per CT surgery  -Extubated successfully.  Supplemental nasal cannula may be needed to maintain oxygen saturations greater than 90%  -Off vasoactive medications   -Aspirin/statin/beta-blocker  -Plavix initiated on 12/18 per CTS  -Duo nebs as needed  -PT/OT following  -SCDs for DVT prophylaxis  -Stable for telemetry transfer    Advance Directives:   Code Status and Medical Interventions:   Ordered at: 12/15/23 2974     Code Status (Patient has no pulse and is not breathing):    CPR (Attempt to Resuscitate)     Medical Interventions (Patient has pulse or is breathing):    Full Support     High level of risk due to severe exacerbation of chronic illness and illness with threat to life or bodily function.    I conducted multidisciplinary rounds in the plan of care was discussed with the multidisciplinary team at that time. In attendance at multidisciplinary rounds was clinical pharmacist, dietitian, nursing staff and case management.    I discussed the patient's findings and my recommendations with patient, nursing staff, and consulting provider    -- Gerry Perkins MD  Pulmonary/Critical Care

## 2023-12-18 NOTE — THERAPY TREATMENT NOTE
Patient Name: Joni Hoffman  : 1945    MRN: [id number]                              Today's Date: 2023       Admit Date: 2023    Visit Dx:     ICD-10-CM ICD-9-CM   1. NSTEMI (non-ST elevated myocardial infarction)  I21.4 410.70   2. History of diabetes mellitus  Z86.39 V12.29   3. History of hypertension  Z86.79 V12.59   4. History of hyperlipidemia  Z86.39 V12.29   5. History of hypothyroidism  Z86.39 V12.29   6. History of coronary artery disease  Z86.79 V12.59   7. NSTEMI, initial episode of care  I21.4 410.71   8. 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    Recurrent herniation of lumbar disc    Bulging lumbar disc    Actinic keratosis    Chronic heart disease    Encounter for long-term (current) use of insulin    HTN (hypertension)    Senile hyperkeratosis    NSTEMI (non-ST elevated myocardial infarction)    CAD (coronary artery disease)    Hypothyroidism (acquired)    T2DM (type 2 diabetes mellitus)    Nocturnal hypoxia    HLD (hyperlipidemia)     Past Medical History:   Diagnosis Date    Acid indigestion     related to meds    Arthritis     Coronary artery disease     s/p 2 stents after MI     Diabetes mellitus     po meds; checks blood sugars at home run     Disease of thyroid gland     hypothyroidism     Hearing loss     uses hearing aids     History of kidney stones     passed    Hypertension     Lumbar back pain     MI, old     2 stents inserted    Wears glasses     Wears hearing aid      Past Surgical History:   Procedure Laterality Date    APPENDECTOMY      CARDIAC CATHETERIZATION N/A 2023    Procedure: Left Heart Cath;  Surgeon: Elza Mena MD;  Location: [facility] CATH INVASIVE LOCATION;  Service: Cardiology;  Laterality: N/A;    CATARACT EXTRACTION WITH INTRAOCULAR LENS  IMPLANT Bilateral     COLONOSCOPY      CORONARY ARTERY BYPASS GRAFT N/A 12/15/2023    Procedure: MEDIAN STERNOTOMY, CORONARY ARTERY BYPASS X3 WITH INTERNAL MAMMARY ARTERY GRAFT, ENDOVASCULAR VEIN HARVEST OF THE RIGHT GREATER SAPHENOUS VEIN;  Surgeon: Adam Darnell MD;  Location: [facility] OR;  Service: Cardiothoracic;  Laterality: N/A;    CORONARY STENT PLACEMENT  2000    x2    INTERVENTIONAL RADIOLOGY PROCEDURE N/A 12/31/2019    Procedure: myelogram lumbar spine;  Surgeon: Karan Pennington MD;  Location: [facility] CATH INVASIVE LOCATION;  Service: Interventional Radiology    LUMBAR DISCECTOMY Left 10/27/2017    Procedure: LUMBAR DISCECTOMY L5-S1 LEFT ;  Surgeon: Elbert De Anda MD;  Location: [facility] OR;  Service:     LUMBAR DISCECTOMY Left 2/19/2020    Procedure: RE-DO LUMBAR DISCECTOMY L5-S1 LEFT;  Surgeon: Elbert De Anda MD;  Location: [facility] OR;  Service: Neurosurgery;  Laterality: Left;    NASAL POLYP SURGERY        General Information       Row Name 12/18/23 1138          Physical Therapy Time and Intention    Document Type therapy note (daily note)  -AE     Mode of Treatment physical therapy  -AE       Row Name 12/18/23 1138          General Information    Patient Profile Reviewed yes  -AE     Existing Precautions/Restrictions cardiac;sternal;oxygen therapy device and L/min;fall  -AE     Barriers to Rehab medically complex  -AE       Row Name 12/18/23 1138          Cognition    Orientation Status (Cognition) oriented x 3  -AE       Row Name 12/18/23 1138          Safety Issues, Functional Mobility    Safety Issues Affecting Function (Mobility) awareness of need for assistance;insight into deficits/self-awareness;safety precaution awareness;safety precautions follow-through/compliance;sequencing abilities  -AE     Impairments Affecting Function (Mobility) balance;coordination;endurance/activity tolerance;shortness of breath  -AE               User Key  (r) = Recorded By, (t) = Taken By, (c) = Cosigned By       Initials Name Provider Type    AE Drake Kahn, PT Physical Therapist                   Mobility       Row Name 12/18/23 1139          Bed Mobility    Comment, (Bed Mobility) Pt received and left UIC.  -AE       Row Name 12/18/23 1139          Transfers    Comment, (Transfers) VCs for hand placement and sequencing to maintain sternal precautions.  -AE       Row Name 12/18/23 1139          Sit-Stand Transfer    Sit-Stand [place] (Transfers) minimum assist (75% patient effort);1 person assist;verbal cues  -AE       Row Name 12/18/23 1139          Gait/Stairs (Locomotion)    [place] Level (Gait) contact guard;1 person assist;verbal cues  -AE     Assistive Device (Gait) other (see comments)  BUE support on tele monitor  -AE     Distance in Feet (Gait) 220  -AE     Deviations/Abnormal Patterns (Gait) bilateral deviations;elton decreased;gait speed decreased;stride length decreased  -AE     Comment, (Gait/Stairs) Pt demo step through gait pattern with short step length, able to improve slightly with increased cues. One brief standing rest break required d/t SOA.  -AE               User Key  (r) = Recorded By, (t) = Taken By, (c) = Cosigned By      Initials Name Provider Type    AE Draek Kahn PT Physical Therapist                   Obj/Interventions       Row Name 12/18/23 1142          Balance    Balance Assessment sitting static balance;sitting dynamic balance;sit to stand dynamic balance;standing static balance;standing dynamic balance  -AE     Static Sitting Balance contact guard  -AE     Dynamic Sitting Balance contact guard  -AE     Position, Sitting Balance unsupported;sitting in chair  -AE     Sit to Stand Dynamic Balance minimal assist;1-person assist;verbal cues  -AE     Static Standing Balance contact guard  -AE     Dynamic Standing Balance contact guard  -AE     Position/Device Used, Standing Balance supported  -AE               User Key  (r) = Recorded By, (t) = Taken By, (c) = Cosigned  By      Initials Name Provider Type    AE Drake Kahn PT Physical Therapist                   Goals/Plan    No documentation.                  Clinical Impression       Row Name 12/18/23 1143          Pain    Pretreatment Pain Rating 0/10 - no pain  -AE     Posttreatment Pain Rating 0/10 - no pain  -AE       Row Name 12/18/23 1143          Plan of Care Review    Plan of Care Reviewed With patient  -AE     Progress improving  -AE     Outcome Evaluation Pt continues to present with increased SOA with activity and decreased endurance with activity. Pt ambulated 220ft with CGA and BUE support. Pt required increased assist for STS and to improve sequencing of AD at times. Continue to progress per pt tolerance.  -AE       Row Name 12/18/23 1143          Vital Signs    Pre Systolic BP Rehab 136  -AE     Pre Treatment Diastolic BP 63  -AE     Post Systolic BP Rehab 152  -AE     Post Treatment Diastolic BP 67  -AE     Pretreatment Heart Rate (beats/min) 110  -AE     Intratreatment Heart Rate (beats/min) 123  -AE     Posttreatment Heart Rate (beats/min) 111  -AE     Pre SpO2 (%) 94  -AE     O2 Delivery Pre Treatment room air  -AE     O2 Delivery Intra Treatment nasal cannula  -AE     Post SpO2 (%) 94  -AE     O2 Delivery Post Treatment room air  -AE     Pre Patient Position Sitting  -AE     Intra Patient Position Standing  -AE     Post Patient Position Sitting  -AE       Row Name 12/18/23 1143          Positioning and Restraints    Pre-Treatment Position sitting in chair/recliner  -AE     Post Treatment Position chair  -AE     In Chair notified nsg;sitting;call light within reach;encouraged to call for assist;RUE elevated;LUE elevated;waffle cushion  -AE               User Key  (r) = Recorded By, (t) = Taken By, (c) = Cosigned By      Initials Name Provider Type    AE Drake Kahn PT Physical Therapist                   Outcome Measures       Row Name 12/18/23 1145 12/18/23 1110       How much help from another  person do you currently need...    Turning from your back to your side while in flat bed without using bedrails? 3  -AE 3  -MG    Moving from lying on back to sitting on the side of a flat bed without bedrails? 3  -AE 3  -MG    Moving to and from a bed to a chair (including a wheelchair)? 3  -AE 3  -MG    Standing up from a chair using your arms (e.g., wheelchair, bedside chair)? 3  -AE 3  -MG    Climbing 3-5 steps with a railing? 2  -AE 2  -MG    To walk in hospital room? 3  -AE 3  -MG    AM-PAC 6 Clicks Score (PT) 17  -AE 17  -MG    Highest Level of Mobility Goal 5 --> Static standing  -AE 5 --> Static standing  -MG      Row Name 12/18/23 0800          How much help from another person do you currently need...    Turning from your back to your side while in flat bed without using bedrails? 3  -TD     Moving from lying on back to sitting on the side of a flat bed without bedrails? 3  -TD     Moving to and from a bed to a chair (including a wheelchair)? 3  -TD     Standing up from a chair using your arms (e.g., wheelchair, bedside chair)? 3  -TD     Climbing 3-5 steps with a railing? 2  -TD     To walk in hospital room? 3  -TD     AM-PAC 6 Clicks Score (PT) 17  -TD     Highest Level of Mobility Goal 5 --> Static standing  -TD       Row Name 12/18/23 1145          Functional Assessment    Outcome Measure Options AM-PAC 6 Clicks Basic Mobility (PT)  -AE               User Key  (r) = Recorded By, (t) = Taken By, (c) = Cosigned By      Initials Name Provider Type    TD Day, Hayes HORTON RN Registered Nurse    [place] RN Registered Nurse    Drake Gonzalez PT Physical Therapist                                 Physical Therapy Education       Title: PT OT SLP Therapies (In Progress)       Topic: Physical Therapy (In Progress)       Point: Mobility training (Done)       Learning Progress Summary             Patient Acceptance, E, VU by AE at 12/18/2023 0820    Acceptance, E, VU,NR by DEO at 12/16/2023 1747                          Point: Home exercise program (Not Started)       Learner Progress:  Not documented in this visit.              Point: Body mechanics (Done)       Learning Progress Summary             Patient Acceptance, E, VU by AE at 12/18/2023 0820    Acceptance, E, VU,NR by DEO at 12/16/2023 1428                         Point: Precautions (Done)       Learning Progress Summary             Patient Acceptance, E, VU by AE at 12/18/2023 0820    Acceptance, E, VU,NR by DEO at 12/16/2023 1428                                         User Key       Initials Effective Dates Name Provider Type Discipline    DEO 06/16/21 -  Vicky Cornell, PT Physical Therapist PT    AE 09/21/21 -  Drake Kahn PT Physical Therapist PT                  PT Recommendation and Plan     Plan of Care Reviewed With: patient  Progress: improving  Outcome Evaluation: Pt continues to present with increased SOA with activity and decreased endurance with activity. Pt ambulated 220ft with CGA and BUE support. Pt required increased assist for STS and to improve sequencing of AD at times. Continue to progress per pt tolerance.     Time Calculation:         PT Charges       Row Name 12/18/23 1145             Time Calculation    Start Time 0820  -AE      PT Received On 12/18/23  -AE      PT Goal Re-Cert Due Date 12/26/23  -AE         Timed Charges    08357 - PT Therapeutic Activity Minutes 23  -AE         Total Minutes    Timed Charges Total Minutes 23  -AE       Total Minutes 23  -AE                User Key  (r) = Recorded By, (t) = Taken By, (c) = Cosigned By      Initials Name Provider Type    AE Drake Kahn PT Physical Therapist                  Therapy Charges for Today       Code Description Service Date Service Provider Modifiers Qty    03131487277 HC PT THERAPEUTIC ACT EA 15 MIN 12/18/2023 Drake Kahn PT GP 2            PT G-Codes  Outcome Measure Options: AM-PAC 6 Clicks Basic Mobility (PT)  AM-PAC 6 Clicks Score (PT): 17  PT  Discharge Summary  Anticipated Discharge Disposition (PT): home with assist    Drake Kahn, PT  12/18/2023

## 2023-12-18 NOTE — PLAN OF CARE
Goal Outcome Evaluation:  Plan of Care Reviewed With: patient      Pt a/ox4, cooperative. 2L NC. VSS. NSR-ST on monitor. Pt ambulated 220ft this evening. No c/o pain. UOP 1500ml. No further complaints at this time.

## 2023-12-19 LAB
ANION GAP SERPL CALCULATED.3IONS-SCNC: 10 MMOL/L (ref 5–15)
BUN SERPL-MCNC: 23 MG/DL (ref 8–23)
BUN/CREAT SERPL: 20.4 (ref 7–25)
CALCIUM SPEC-SCNC: 8.7 MG/DL (ref 8.6–10.5)
CHLORIDE SERPL-SCNC: 104 MMOL/L (ref 98–107)
CO2 SERPL-SCNC: 24 MMOL/L (ref 22–29)
CREAT SERPL-MCNC: 1.13 MG/DL (ref 0.76–1.27)
EGFRCR SERPLBLD CKD-EPI 2021: 66.5 ML/MIN/1.73
GLUCOSE BLDC GLUCOMTR-MCNC: 178 MG/DL (ref 70–130)
GLUCOSE BLDC GLUCOMTR-MCNC: 183 MG/DL (ref 70–130)
GLUCOSE BLDC GLUCOMTR-MCNC: 190 MG/DL (ref 70–130)
GLUCOSE BLDC GLUCOMTR-MCNC: 232 MG/DL (ref 70–130)
GLUCOSE SERPL-MCNC: 171 MG/DL (ref 65–99)
HCT VFR BLD AUTO: 26.1 % (ref 37.5–51)
HGB BLD-MCNC: 8.7 G/DL (ref 13–17.7)
POTASSIUM SERPL-SCNC: 4 MMOL/L (ref 3.5–5.2)
SODIUM SERPL-SCNC: 138 MMOL/L (ref 136–145)

## 2023-12-19 PROCEDURE — 63710000001 INSULIN DETEMIR PER 5 UNITS: Performed by: STUDENT IN AN ORGANIZED HEALTH CARE EDUCATION/TRAINING PROGRAM

## 2023-12-19 PROCEDURE — 85014 HEMATOCRIT: CPT | Performed by: STUDENT IN AN ORGANIZED HEALTH CARE EDUCATION/TRAINING PROGRAM

## 2023-12-19 PROCEDURE — 99232 SBSQ HOSP IP/OBS MODERATE 35: CPT | Performed by: STUDENT IN AN ORGANIZED HEALTH CARE EDUCATION/TRAINING PROGRAM

## 2023-12-19 PROCEDURE — 85018 HEMOGLOBIN: CPT | Performed by: STUDENT IN AN ORGANIZED HEALTH CARE EDUCATION/TRAINING PROGRAM

## 2023-12-19 PROCEDURE — 82948 REAGENT STRIP/BLOOD GLUCOSE: CPT

## 2023-12-19 PROCEDURE — 97116 GAIT TRAINING THERAPY: CPT

## 2023-12-19 PROCEDURE — 99024 POSTOP FOLLOW-UP VISIT: CPT | Performed by: THORACIC SURGERY (CARDIOTHORACIC VASCULAR SURGERY)

## 2023-12-19 PROCEDURE — 97110 THERAPEUTIC EXERCISES: CPT

## 2023-12-19 PROCEDURE — 63710000001 INSULIN LISPRO (HUMAN) PER 5 UNITS: Performed by: STUDENT IN AN ORGANIZED HEALTH CARE EDUCATION/TRAINING PROGRAM

## 2023-12-19 PROCEDURE — 80048 BASIC METABOLIC PNL TOTAL CA: CPT | Performed by: STUDENT IN AN ORGANIZED HEALTH CARE EDUCATION/TRAINING PROGRAM

## 2023-12-19 RX ORDER — CHOLECALCIFEROL (VITAMIN D3) 125 MCG
5 CAPSULE ORAL NIGHTLY
Status: DISCONTINUED | OUTPATIENT
Start: 2023-12-19 | End: 2023-12-20 | Stop reason: HOSPADM

## 2023-12-19 RX ORDER — METOPROLOL TARTRATE 50 MG/1
50 TABLET, FILM COATED ORAL EVERY 12 HOURS SCHEDULED
Status: DISCONTINUED | OUTPATIENT
Start: 2023-12-19 | End: 2023-12-20 | Stop reason: HOSPADM

## 2023-12-19 RX ADMIN — Medication 10 ML: at 08:53

## 2023-12-19 RX ADMIN — SENNOSIDES AND DOCUSATE SODIUM 2 TABLET: 8.6; 5 TABLET ORAL at 08:52

## 2023-12-19 RX ADMIN — LEVOTHYROXINE SODIUM 137 MCG: 0.14 TABLET ORAL at 06:02

## 2023-12-19 RX ADMIN — INSULIN LISPRO 3 UNITS: 100 INJECTION, SOLUTION INTRAVENOUS; SUBCUTANEOUS at 17:04

## 2023-12-19 RX ADMIN — Medication 10 ML: at 21:00

## 2023-12-19 RX ADMIN — INSULIN LISPRO 3 UNITS: 100 INJECTION, SOLUTION INTRAVENOUS; SUBCUTANEOUS at 21:53

## 2023-12-19 RX ADMIN — ACETAMINOPHEN 650 MG: 325 TABLET ORAL at 20:21

## 2023-12-19 RX ADMIN — TAMSULOSIN HYDROCHLORIDE 0.4 MG: 0.4 CAPSULE ORAL at 08:53

## 2023-12-19 RX ADMIN — ATORVASTATIN CALCIUM 40 MG: 40 TABLET, FILM COATED ORAL at 20:21

## 2023-12-19 RX ADMIN — FAMOTIDINE 10 MG: 20 TABLET, FILM COATED ORAL at 08:53

## 2023-12-19 RX ADMIN — CLOPIDOGREL BISULFATE 75 MG: 75 TABLET ORAL at 08:53

## 2023-12-19 RX ADMIN — INSULIN DETEMIR 30 UNITS: 100 INJECTION, SOLUTION SUBCUTANEOUS at 08:52

## 2023-12-19 RX ADMIN — MONTELUKAST 10 MG: 10 TABLET, FILM COATED ORAL at 20:22

## 2023-12-19 RX ADMIN — INSULIN LISPRO 5 UNITS: 100 INJECTION, SOLUTION INTRAVENOUS; SUBCUTANEOUS at 08:54

## 2023-12-19 RX ADMIN — INSULIN LISPRO 3 UNITS: 100 INJECTION, SOLUTION INTRAVENOUS; SUBCUTANEOUS at 08:52

## 2023-12-19 RX ADMIN — METOPROLOL TARTRATE 50 MG: 50 TABLET ORAL at 08:52

## 2023-12-19 RX ADMIN — INSULIN LISPRO 5 UNITS: 100 INJECTION, SOLUTION INTRAVENOUS; SUBCUTANEOUS at 17:04

## 2023-12-19 RX ADMIN — INSULIN LISPRO 5 UNITS: 100 INJECTION, SOLUTION INTRAVENOUS; SUBCUTANEOUS at 11:46

## 2023-12-19 RX ADMIN — METOPROLOL TARTRATE 50 MG: 50 TABLET ORAL at 20:22

## 2023-12-19 RX ADMIN — PANTOPRAZOLE SODIUM 40 MG: 40 INJECTION, POWDER, LYOPHILIZED, FOR SOLUTION INTRAVENOUS at 06:02

## 2023-12-19 RX ADMIN — FLUTICASONE PROPIONATE 2 SPRAY: 50 SPRAY, METERED NASAL at 08:55

## 2023-12-19 RX ADMIN — Medication 5 MG: at 20:21

## 2023-12-19 RX ADMIN — Medication 10 ML: at 08:55

## 2023-12-19 RX ADMIN — ASPIRIN 81 MG: 81 TABLET, COATED ORAL at 08:52

## 2023-12-19 RX ADMIN — ACETAMINOPHEN 650 MG: 325 TABLET ORAL at 14:05

## 2023-12-19 NOTE — NURSING NOTE
Pt. Referred for Phase II Cardiac Rehab. Staff discussed benefits of exercise, program protocol, and educational material provided. Teach back verified.  Permission granted from patient for staff to fax referral information to outlying program at this time.  Staff faxed referral info to [place].

## 2023-12-19 NOTE — PROGRESS NOTES
" [facility] - Progress Note    Joni Hoffman  1945  [id number]    12/19/23, 08:34 EST      Chief Complaint: Following for Post Op managemnet of BP, rhythm    Subjective:   Sitting up in bedside chair, wife present.  Denies pain or dyspnea.  Did not sleep well.  Maintaining NSR/ST on tele, moved out of ICU.  Pain is controlled.      Review of Systems:  Pertinent positives are listed above and in physical exam.  All others have been reviewed and are negative.    acetaminophen, 650 mg, Oral, Q8H  aspirin, 81 mg, Oral, Daily  atorvastatin, 40 mg, Oral, Nightly  clopidogrel, 75 mg, Oral, Daily  famotidine, 10 mg, Oral, Daily  fluticasone, 2 spray, Each Nare, Daily  insulin detemir, 30 Units, Subcutaneous, Daily  insulin lispro, 3-14 Units, Subcutaneous, 4x Daily AC & at Bedtime  Insulin Lispro, 5 Units, Subcutaneous, TID With Meals  levothyroxine, 137 mcg, Oral, Q AM  metoprolol tartrate, 25 mg, Oral, Q8H  montelukast, 10 mg, Oral, Nightly  pantoprazole, 40 mg, Intravenous, Q AM  pharmacy consult - MTM, , Does not apply, Daily  senna-docusate sodium, 2 tablet, Oral, BID  sodium chloride, 10 mL, Intravenous, Q12H  sodium chloride, 10 mL, Intravenous, Q12H  tamsulosin, 0.4 mg, Oral, Daily        Objective:  Vitals:   height is 167.6 cm (65.98\") and weight is 96.7 kg (213 lb 3.2 oz). His oral temperature is 99.5 °F (37.5 °C). His blood pressure is 142/63 and his pulse is 103. His respiration is 20 and oxygen saturation is 95%.     Intake/Output Summary (Last 24 hours) at 12/19/2023 0833  Last data filed at 12/18/2023 1130  Gross per 24 hour   Intake 250 ml   Output 400 ml   Net -150 ml       Physical Exam:  General:  WN, NAD, A and O x3.  CV:  Normal S1,S2. No murmur, rub, or gallop.  Resp:  CTA Behzad, equal, nonlabored.  Abd:  Soft, + BS, no organomegaly. Nontender to palpation.  Extrem:  No edema BLE, 2+ pedal/PT pulses.            Results from last 7 days   Lab Units 12/19/23  0618 12/18/23  0441   WBC " 10*3/mm3  --  9.73   HEMOGLOBIN g/dL 8.7* 8.1*   HEMATOCRIT % 26.1* 24.7*   PLATELETS 10*3/mm3  --  147     Results from last 7 days   Lab Units 12/19/23  0618 12/17/23  1437 12/17/23  0353   SODIUM mmol/L 138   < > 134*   POTASSIUM mmol/L 4.0   < > 3.7   CHLORIDE mmol/L 104   < > 100   CO2 mmol/L 24.0   < > 24.0   BUN mg/dL 23   < > 17   CREATININE mg/dL 1.13   < > 1.37*   CALCIUM mg/dL 8.7   < > 8.6   BILIRUBIN mg/dL  --   --  0.4   ALK PHOS U/L  --   --  45   ALT (SGPT) U/L  --   --  11   AST (SGOT) U/L  --   --  33   GLUCOSE mg/dL 171*   < > 139*    < > = values in this interval not displayed.     Results from last 7 days   Lab Units 12/16/23  0304 12/15/23  1502 12/12/23  1920   INR  1.20* 1.19* 0.93     Results from last 7 days   Lab Units 12/13/23  0331   TSH uIU/mL 2.640     Results from last 7 days   Lab Units 12/13/23  0331   CHOLESTEROL mg/dL 103   TRIGLYCERIDES mg/dL 122   HDL CHOL mg/dL 34*   LDL CHOL mg/dL 47     Results from last 7 days   Lab Units 12/12/23  1920   PROBNP pg/mL 733.6       Tele:  NSR/Sinus Tachycardia      Assessment:  -78-year-old CM with past medical history of CAD, HTN, HLP, DM 2 presents to local hospital after 2 to 3-day history of exertional chest pain and dyspnea with normal EKG, elevated cardiac markers.  Repeat serial troponins here at [facility] are also elevated, findings consistent with NSTEMI. MV CAD by [facility] 12/13/23.  -  CABG 12/15/2023  -CAD, remote stenting  -HTN  -HLP  -DM 2, A1C  7  -Hypothyroidism on chronic supplementation  -BPH           Plan:  -Admitted to hospitalist services.    -Continue low-dose aspirin, statin.  He was on Repatha at home.    -  Plavix per CTS.  -  Increase Lopressor to 50mg BID  -Continue Synthroid  -Cardiology will follow post operatively.  Home soon.      Jennifer Granger PA-C working with Magan Mcmanus MD  12/19/2023  08:37 a.m.

## 2023-12-19 NOTE — PROGRESS NOTES
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 On room air. Ambulated 220 ft yesterday with PT.    Objective    Vital Signs  Temp:  [98.1 °F (36.7 °C)-99.5 °F (37.5 °C)] 99.5 °F (37.5 °C)  Heart Rate:  [] 103  Resp:  [18-24] 20  BP: (119-146)/(61-74) 142/63    Physical Exam:   General Appearance: alert, appears stated age and cooperative   Lungs: clear to auscultation     Heart: regular rhythm & normal rate, normal S1, S2 and no murmur, no gallop, no rub   Chest: sternum stable   Skin: Incision c/d/i     Results     Results from last 7 days   Lab Units 12/19/23  0618 12/18/23  0441   WBC 10*3/mm3  --  9.73   HEMOGLOBIN g/dL 8.7* 8.1*   HEMATOCRIT % 26.1* 24.7*   PLATELETS 10*3/mm3  --  147     Results from last 7 days   Lab Units 12/19/23  0618   SODIUM mmol/L 138   POTASSIUM mmol/L 4.0   CHLORIDE mmol/L 104   CO2 mmol/L 24.0   BUN mg/dL 23   CREATININE mg/dL 1.13   GLUCOSE mg/dL 171*   CALCIUM mg/dL 8.7       Imaging Results (Last 24 Hours)       ** No results found for the last 24 hours. **            Assessment    NSTEMI (non-ST elevated myocardial infarction)    HTN (hypertension)    CAD (coronary artery disease)    Hypothyroidism (acquired)    T2DM (type 2 diabetes mellitus)    Nocturnal hypoxia    HLD (hyperlipidemia)      Plan   Ambulate  Pulm toilet  Plavix daily  PT/OT  No narcotics  Possible D/C home tomorrow    Tasha Gómez PA-C  12/19/23  07:52 EST

## 2023-12-19 NOTE — PLAN OF CARE
Goal Outcome Evaluation:  Plan of Care Reviewed With: patient        Progress: improving  Outcome Evaluation: patient ambulated 140' + 140' with CGA x1 and rolling walker for support, patient required 1 seated rest break due to weakness/fatigue/SOA. Verbal cues for upright posture and staying close to walker to improve FF posture. Recommend home with assist.

## 2023-12-19 NOTE — THERAPY TREATMENT NOTE
Patient Name: Joni Hoffman  : 1945    MRN: [id number]                              Today's Date: 2023       Admit Date: 2023    Visit Dx:     ICD-10-CM ICD-9-CM   1. NSTEMI (non-ST elevated myocardial infarction)  I21.4 410.70   2. History of diabetes mellitus  Z86.39 V12.29   3. History of hypertension  Z86.79 V12.59   4. History of hyperlipidemia  Z86.39 V12.29   5. History of hypothyroidism  Z86.39 V12.29   6. History of coronary artery disease  Z86.79 V12.59   7. NSTEMI, initial episode of care  I21.4 410.71   8. 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    Recurrent herniation of lumbar disc    Bulging lumbar disc    Actinic keratosis    Chronic heart disease    Encounter for long-term (current) use of insulin    HTN (hypertension)    Senile hyperkeratosis    NSTEMI (non-ST elevated myocardial infarction)    CAD (coronary artery disease)    Hypothyroidism (acquired)    T2DM (type 2 diabetes mellitus)    Nocturnal hypoxia    HLD (hyperlipidemia)     Past Medical History:   Diagnosis Date    Acid indigestion     related to meds    Arthritis     Coronary artery disease     s/p 2 stents after MI     Diabetes mellitus     po meds; checks blood sugars at home run     Disease of thyroid gland     hypothyroidism     Hearing loss     uses hearing aids     History of kidney stones     passed    Hypertension     Lumbar back pain     MI, old     2 stents inserted    Wears glasses     Wears hearing aid      Past Surgical History:   Procedure Laterality Date    APPENDECTOMY      CARDIAC CATHETERIZATION N/A 2023    Procedure: Left Heart Cath;  Surgeon: Elza Mena MD;  Location[facility] CATH INVASIVE LOCATION;  Service: Cardiology;  Laterality: N/A;    CATARACT EXTRACTION WITH INTRAOCULAR LENS  IMPLANT Bilateral     COLONOSCOPY      CORONARY ARTERY BYPASS GRAFT N/A 12/15/2023    Procedure: MEDIAN STERNOTOMY, CORONARY ARTERY BYPASS X3 WITH INTERNAL MAMMARY ARTERY GRAFT, ENDOVASCULAR VEIN HARVEST OF THE RIGHT GREATER SAPHENOUS VEIN;  Surgeon: Adam Darnell MD;  [facility];  Service: Cardiothoracic;  Laterality: N/A;    CORONARY STENT PLACEMENT  2000    x2    INTERVENTIONAL RADIOLOGY PROCEDURE N/A 12/31/2019    Procedure: myelogram lumbar spine;  Surgeon: Karan Pennington MD;  [facility];  Service: Interventional Radiology    LUMBAR DISCECTOMY Left 10/27/2017    Procedure: LUMBAR DISCECTOMY L5-S1 LEFT ;  Surgeon: Elbert De Anda MD;  Location:  [facility] OR;  Service:     LUMBAR DISCECTOMY Left 2/19/2020    Procedure: RE-DO LUMBAR DISCECTOMY L5-S1 LEFT;  Surgeon: Elbert De Anda MD;  Location:  [facility] OR;  Service: Neurosurgery;  Laterality: Left;    NASAL POLYP SURGERY        General Information       Row Name 12/19/23 1408          Physical Therapy Time and Intention    Document Type therapy note (daily note)  -AS     Mode of Treatment physical therapy  -AS       Row Name 12/19/23 1408          General Information    Patient Profile Reviewed yes  -AS     Existing Precautions/Restrictions cardiac;sternal;fall  -AS     Barriers to Rehab medically complex  -AS       Row Name 12/19/23 1408          Cognition    Orientation Status (Cognition) oriented x 3  -AS       Row Name 12/19/23 1408          Safety Issues, Functional Mobility    Safety Issues Affecting Function (Mobility) awareness of need for assistance;safety precaution awareness;insight into deficits/self-awareness;positioning of assistive device;safety precautions follow-through/compliance  -AS     Impairments Affecting Function (Mobility) balance;coordination;endurance/activity tolerance;shortness of breath  -AS     Comment, Safety Issues/Impairments (Mobility) alert and following commands  -AS               User  Key  (r) = Recorded By, (t) = Taken By, (c) = Cosigned By      Initials Name Provider Type    AS Celeste Morin PTA Physical Therapist Assistant                   Mobility       Row Name 12/19/23 1409          Bed Mobility    Comment, (Bed Mobility) [facility] pre/post tx  -AS       Row Name 12/19/23 1409          Transfers    Comment, (Transfers) cues for sternal precautions  -AS       Row Name 12/19/23 1409          Sit-Stand Transfer    Sit-Stand Lane (Transfers) verbal cues;minimum assist (75% patient effort);1 person assist  -AS     Assistive Device (Sit-Stand Transfers) other (see comments)  -AS     Comment, (Sit-Stand Transfer) cues to maintain sternal precautions  -AS       Row Name 12/19/23 1409          Gait/Stairs (Locomotion)    Lane Level (Gait) verbal cues;contact guard;1 person assist  -AS     Assistive Device (Gait) walker, front-wheeled  -AS     Distance in Feet (Gait) 140 + 140  -AS     Deviations/Abnormal Patterns (Gait) bilateral deviations;elton decreased;gait speed decreased;stride length decreased  -AS     Bilateral Gait Deviations forward flexed posture;weight shift ability decreased  -AS     Comment, (Gait/Stairs) patient ambulated 140' + 140' with CGA x1 and rolling walker for support, patient required 1 seated rest break due to weakness/fatigue/SOA. Verbal cues for upright posture and staying close to walker to improve FF posture.  -AS               User Key  (r) = Recorded By, (t) = Taken By, (c) = Cosigned By      Initials Name Provider Type    AS Celeste Morin PTA Physical Therapist Assistant                   Obj/Interventions       Row Name 12/19/23 1414          Motor Skills    Therapeutic Exercise knee;ankle  -AS       Row Name 12/19/23 1414          Knee (Therapeutic Exercise)    Knee (Therapeutic Exercise) strengthening exercise  -AS     Knee Strengthening (Therapeutic Exercise) bilateral;marching while seated;LAQ (long arc quad);sitting;10 repetitions  -AS        Row Name 12/19/23 1414          Ankle (Therapeutic Exercise)    Ankle (Therapeutic Exercise) AROM (active range of motion)  -AS     Ankle AROM (Therapeutic Exercise) bilateral;dorsiflexion;plantarflexion;sitting;10 repetitions  -AS       Row Name 12/19/23 1414          Balance    Dynamic Standing Balance verbal cues;contact guard;1-person assist  -AS     Position/Device Used, Standing Balance supported;walker, front-wheeled  -AS     Comment, Balance CGA for safety,no LOB or unsteadiness noticed  -AS               User Key  (r) = Recorded By, (t) = Taken By, (c) = Cosigned By      Initials Name Provider Type    AS Celeste Morin, ISAAC Physical Therapist Assistant                   Goals/Plan    No documentation.                  Clinical Impression       Row Name 12/19/23 1415          Pain    Pretreatment Pain Rating 0/10 - no pain  -AS     Posttreatment Pain Rating 0/10 - no pain  -AS       [facility] Name 12/19/23 1415          Plan of Care Review    Plan of Care Reviewed With patient  -AS     Progress improving  -AS     Outcome Evaluation patient ambulated 140' + 140' with CGA x1 and rolling walker for support, patient required 1 seated rest break due to weakness/fatigue/SOA. Verbal cues for upright posture and staying close to walker to improve FF posture. Recommend home with assist.  -AS       Row Name 12/19/23 1415          Positioning and Restraints    Pre-Treatment Position sitting in chair/recliner  -AS     Post Treatment Position chair  -AS     In Chair reclined;call light within reach;encouraged to call for assist;with family/caregiver;waffle cushion;legs elevated;notified nsg  -AS               User Key  (r) = Recorded By, (t) = Taken By, (c) = Cosigned By      Initials Name Provider Type    AS Celeste Morin PTA Physical Therapist Assistant                   Outcome Measures       Row Name 12/19/23 1416 12/19/23 0800       How much help from another person do you currently need...    Turning  from your back to your side while in flat bed without using bedrails? 3  -AS 3  -MG    Moving from lying on back to sitting on the side of a flat bed without bedrails? 3  -AS 3  -MG    Moving to and from a bed to a chair (including a wheelchair)? 3  -AS 3  -MG    Standing up from a chair using your arms (e.g., wheelchair, bedside chair)? 3  -AS 3  -MG    Climbing 3-5 steps with a railing? 2  -AS 2  -MG    To walk in hospital room? 3  -AS 3  -MG    AM-PAC 6 Clicks Score (PT) 17  -AS 17  -MG    Highest Level of Mobility Goal 5 --> Static standing  -AS 5 --> Static standing  -MG      Row Name 12/19/23 1416          Functional Assessment    Outcome Measure Options AM-PAC 6 Clicks Basic Mobility (PT)  -AS               User Key  (r) = Recorded By, (t) = Taken By, (c) = Cosigned By      Initials Name Provider Type    AS Celeste Morin PTA Physical Therapist Assistant    MG Indigo Brian RN Registered Nurse                                 Physical Therapy Education       Title: PT OT SLP Therapies (In Progress)       Topic: Physical Therapy (In Progress)       Point: Mobility training (In Progress)       Learning Progress Summary             Patient Acceptance, E, NR by AS at 12/19/2023 1416    Acceptance, E, VU by AE at 12/18/2023 0820    Acceptance, E, VU,NR by DEO at 12/16/2023 1428                         Point: Home exercise program (In Progress)       Learning Progress Summary             Patient Acceptance, E, NR by AS at 12/19/2023 1416                         Point: Body mechanics (In Progress)       Learning Progress Summary             Patient Acceptance, E, NR by AS at 12/19/2023 1416    Acceptance, E, VU by AE at 12/18/2023 0820    Acceptance, E, VU,NR by DEO at 12/16/2023 1428                         Point: Precautions (In Progress)       Learning Progress Summary             Patient Acceptance, E, NR by AS at 12/19/2023 1416    Acceptance, E, VU by AE at 12/18/2023 0820    Acceptance, E, VU,NR by  DEO at 12/16/2023 1428                                         User Key       Initials Effective Dates Name Provider Type Discipline    AS 04/28/23 -  Celeste Morin PTA Physical Therapist Assistant PT    DEO 06/16/21 -  Vicky Cornell, PT Physical Therapist PT    AE 09/21/21 -  Drake Kahn, PT Physical Therapist PT                  PT Recommendation and Plan     Plan of Care Reviewed With: patient  Progress: improving  Outcome Evaluation: patient ambulated 140' + 140' with CGA x1 and rolling walker for support, patient required 1 seated rest break due to weakness/fatigue/SOA. Verbal cues for upright posture and staying close to walker to improve FF posture. Recommend home with assist.     Time Calculation:         PT Charges       Row Name 12/19/23 1416             Time Calculation    Start Time 1330  -AS      PT Received On 12/19/23  -AS      PT Goal Re-Cert Due Date 12/26/23  -AS         Timed Charges    01190 - PT Therapeutic Exercise Minutes 10  -AS      23714 - Gait Training Minutes  14  -AS         Total Minutes    Timed Charges Total Minutes 24  -AS       Total Minutes 24  -AS                User Key  (r) = Recorded By, (t) = Taken By, (c) = Cosigned By      Initials Name Provider Type    AS Celeste Morin PTA Physical Therapist Assistant                  Therapy Charges for Today       Code Description Service Date Service Provider Modifiers Qty    53267015138 HC PT THER PROC EA 15 MIN 12/19/2023 Celeste Morin PTA GP 1    51901333318 HC GAIT TRAINING EA 15 MIN 12/19/2023 Celeste Morin PTA GP 1            PT G-Codes  Outcome Measure Options: AM-PAC 6 Clicks Basic Mobility (PT)  AM-PAC 6 Clicks Score (PT): 17       Celeste Morin PTA  12/19/2023

## 2023-12-19 NOTE — PROGRESS NOTES
[facility] Medicine Services  PROGRESS NOTE    Patient Name: Joni Hoffman  : 1945  MRN: [id number]    Date of Admission: 2023  Primary Care Physician: Little Barker MD    Subjective   Subjective     CC:  ICU transfer    HPI:  Evaluated patient this morning. Patient felt well, no complaints. Reports that he did not sleep well overnight. Denied chest pain and shortness of breath. Was sitting up in chair. Discussed patient's care with spouse present at bedside.      Objective   Objective     Vital Signs:   Temp:  [98.1 °F (36.7 °C)-99.5 °F (37.5 °C)] 98.7 °F (37.1 °C)  Heart Rate:  [] 107  Resp:  [18-26] 26  BP: (107-142)/(55-74) 107/55     Physical Exam:  Constitutional: Awake, alert, resting comfortably, well-appearing  HENT: NCAT, mucous membranes moist  Respiratory: Clear to auscultation bilaterally, respiratory effort normal   Cardiovascular: Tachycardic, no murmurs, rubs, or gallops  Gastrointestinal: Positive bowel sounds, soft, nontender, nondistended  Musculoskeletal: No bilateral ankle edema  Neurologic: Alert and oriented x 3, no focal deficits, speech clear  Skin: No rashes      Results Reviewed:  LAB RESULTS:      Lab 23  0618 23  0441 23  0353 23  0304 12/15/23  1831 12/15/23  1743 12/15/23  1502 23  0550 23  1830 23  1056 23  0331 23  0040 23  2133 23  1920   WBC  --  9.73 11.56* 12.78* 14.03*  --  13.92*   < >  --   --  7.23  --   --  9.01   HEMOGLOBIN 8.7* 8.1* 8.2* 9.2* 9.4*   < > 10.5*   < >  --   --  13.9  --   --  15.4   HEMATOCRIT 26.1* 24.7* 25.3* 28.5* 28.7*   < > 31.8*   < >  --   --  41.7  --   --  47.3   PLATELETS  --  147 125* 165 176  --  174   < >  --   --  176  --   --  200   NEUTROS ABS  --  7.25*  --  10.60*  --   --   --   --   --   --  4.08  --   --  5.27   IMMATURE GRANS (ABS)  --  0.03  --  0.06*  --   --   --   --   --   --  0.02  --   --  0.02   LYMPHS ABS   --  1.09  --  0.74  --   --   --   --   --   --  1.55  --   --  1.90   MONOS ABS  --  1.11*  --  1.33*  --   --   --   --   --   --  0.90  --   --  1.09*   EOS ABS  --  0.21  --  0.02  --   --   --   --   --   --  0.63*  --   --  0.67*   MCV  --  96.1 95.5 96.6 95.3  --  94.9   < >  --   --  90.8  --   --  93.5   PROCALCITONIN  --   --   --   --   --   --   --   --   --   --   --   --  0.07  --    LACTATE  --   --   --   --   --   --   --   --   --   --   --  0.8  --   --    PROTIME  --   --   --  15.4*  --   --  15.2*  --   --   --   --   --   --  12.5   APTT  --   --   --   --   --   --  30.7  --   --   --   --   --   --  33.5*   HEPARIN ANTI-XA  --   --   --   --   --   --   --   --  0.10* 0.30 0.25*  --   --  0.10*   D DIMER QUANT  --   --   --   --   --   --   --   --   --   --   --   --   --  0.32    < > = values in this interval not displayed.         Lab 12/19/23  0618 12/18/23  0441 12/17/23  1437 12/17/23  0353 12/16/23  0304 12/15/23  1831 12/15/23  1502 12/14/23  0550 12/13/23  0331   SODIUM 138 137  --  134* 142 143 143 140 141   POTASSIUM 4.0 4.1 4.2 3.7 4.3 4.0 3.5 4.1 3.8   CHLORIDE 104 103  --  100 107 107 108* 105 105   CO2 24.0 25.0  --  24.0 23.0 24.0 25.0 26.0 26.0   ANION GAP 10.0 9.0  --  10.0 12.0 12.0 10.0 9.0 10.0   BUN 23 23  --  17 15 15 15 18 21   CREATININE 1.13 1.06  --  1.37* 1.62* 1.25 1.19 1.06 1.07   EGFR 66.5 71.8  --  52.8* 43.2* 58.9* 62.5 71.8 71.0   GLUCOSE 171* 210*  --  139* 155* 181* 137* 146* 105*   CALCIUM 8.7 8.3*  --  8.6 8.5* 8.8 8.8 8.6 8.9   IONIZED CALCIUM  --   --   --   --   --   --  1.28  --   --    MAGNESIUM  --   --   --   --  2.0 2.0 2.3 1.7 1.8   PHOSPHORUS  --   --   --   --   --  2.9 2.7  --   --    HEMOGLOBIN A1C  --   --   --   --   --   --   --   --  7.00*   TSH  --   --   --   --   --   --   --   --  2.640         Lab 12/17/23  0353 12/16/23  0304 12/15/23  1831 12/15/23  1502 12/12/23  1920   TOTAL PROTEIN 5.5* 5.3*  --   --  7.0   ALBUMIN 3.5 4.2 4.4  3.9 4.0   GLOBULIN 2.0 1.1  --   --  3.0   ALT (SGPT) 11 21  --   --  18   AST (SGOT) 33 48*  --   --  20   BILIRUBIN 0.4 0.2  --   --  0.2   ALK PHOS 45 43  --   --  72   LIPASE  --   --   --   --  41         Lab 12/16/23  0304 12/15/23  1502 12/13/23  0040 12/12/23  2133 12/12/23  1920   PROBNP  --   --   --   --  733.6   HSTROP T  --   --  154* 140* 124*   PROTIME 15.4* 15.2*  --   --  12.5   INR 1.20* 1.19*  --   --  0.93         Lab 12/13/23  0331   CHOLESTEROL 103   LDL CHOL 47   HDL CHOL 34*   TRIGLYCERIDES 122         Lab 12/13/23  1830   ABO TYPING O   RH TYPING Negative   ANTIBODY SCREEN Negative         Lab 12/15/23  1903 12/15/23  1546   PH, ARTERIAL 7.376 7.349*   PCO2, ARTERIAL 40.8 44.8   PO2 ART 73.7* 171.0*   FIO2 40 21   HCO3 ART 23.9 24.6   BASE EXCESS ART -1.2* -1.1*   CARBOXYHEMOGLOBIN 1.4 1.1     Brief Urine Lab Results       None            Microbiology Results Abnormal       None            No radiology results from the last 24 hrs    Results for orders placed during the hospital encounter of 12/12/23    Adult Transthoracic Echocardiogram Complete    Interpretation Summary    Left ventricular systolic function is normal. Calculated left ventricular EF of 64%.  There is hypokinesis of the anteroseptum, inferoseptum, and apex    Left ventricular diastolic function was normal.    Normal right ventricular size and systolic function    No significant valvular abnormality    No pericardial effusion    No LV thrombus on contrasted images    No prior echocardiogram for comparison      Current medications:  Scheduled Meds:acetaminophen, 650 mg, Oral, Q8H  aspirin, 81 mg, Oral, Daily  atorvastatin, 40 mg, Oral, Nightly  clopidogrel, 75 mg, Oral, Daily  famotidine, 10 mg, Oral, Daily  fluticasone, 2 spray, Each Nare, Daily  insulin detemir, 30 Units, Subcutaneous, Daily  insulin lispro, 3-14 Units, Subcutaneous, 4x Daily AC & at Bedtime  Insulin Lispro, 5 Units, Subcutaneous, TID With  Meals  levothyroxine, 137 mcg, Oral, Q AM  metoprolol tartrate, 50 mg, Oral, Q12H  montelukast, 10 mg, Oral, Nightly  pantoprazole, 40 mg, Intravenous, Q AM  pharmacy consult - MTM, , Does not apply, Daily  senna-docusate sodium, 2 tablet, Oral, BID  sodium chloride, 10 mL, Intravenous, Q12H  sodium chloride, 10 mL, Intravenous, Q12H  tamsulosin, 0.4 mg, Oral, Daily      Continuous Infusions:dextrose 5 % with KCl 20 mEq, 30 mL/hr, Last Rate: Stopped (12/18/23 0700)      PRN Meds:.  acetaminophen    senna-docusate sodium **AND** polyethylene glycol **AND** bisacodyl **AND** bisacodyl    Calcium Replacement - Follow Nurse / BPA Driven Protocol    dextrose    dextrose    glucagon (human recombinant)    ipratropium-albuterol    Magnesium Cardiology Dose Replacement - Follow Nurse / BPA Driven Protocol    nitroglycerin    nitroglycerin    ondansetron    Phosphorus Replacement - Follow Nurse / BPA Driven Protocol    Potassium Replacement - Follow Nurse / BPA Driven Protocol    sodium chloride    sodium chloride    sodium chloride    Assessment & Plan   Assessment & Plan     Active Hospital Problems    Diagnosis  POA    **NSTEMI (non-ST elevated myocardial infarction) [I21.4]  Yes    Nocturnal hypoxia [G47.34]  Yes    HLD (hyperlipidemia) [E78.5]  Yes    CAD (coronary artery disease) [I25.10]  Yes    Hypothyroidism (acquired) [E03.9]  Yes    T2DM (type 2 diabetes mellitus) [E11.9]  Yes    HTN (hypertension) [I10]  Yes      Resolved Hospital Problems    Diagnosis Date Resolved POA    NSTEMI, initial episode of care [I21.4] 12/15/2023 Yes        Brief Hospital Course to date:  Joni Hoffman is a 78 y.o. male with a past medical history of CAD with prior stents, hypertension, hyperlipidemia, type 2 diabetes mellitus and hypothyroidism who presented to the ED with complaint of chest pain. Was found to have NSTEMI, underwent cardiac catheterization which showed multivessel CAD. CT surgery was consulted for CABG evaluation.  Underwent CABG x 3 on 12/15/2023. Was admitted to the ICU after surgery. Was transferred out of the ICU to telemetry floor on 12/18.    NSTEMI  Multivessel CAD  - s/p [facility] 12/13 which showed multivessel coronary artery disease  - s/p CABG x 3 on 12/15 performed by Dr. Darnell  - Cardiothoracic surgery and Cardiology following.  - Continue aspirin, Plavix, statin daily. PT/OT following. Possible discharge home tomorrow.    Diabetes mellitus type 2  - A1c 7% this admission   - required insulin gtt during/after CABG  - Continue Levemir 30 units nightly with lispro 5 units 3 times daily and moderate dose corrective scale insulin. Continue to monitor and adjust insulin if needed.    Hypothyroidism  - Continue levothyroxine.    BPH  - Continue tamsulosin.      Expected Discharge Location and Transportation: Home  Expected Discharge   Expected Discharge Date: 12/20/2023; Expected Discharge Time:      DVT prophylaxis:  Mechanical DVT prophylaxis orders are present.     AM-PAC 6 Clicks Score (PT): 17 (12/19/23 1416)    CODE STATUS:   Code Status and Medical Interventions:   Ordered at: 12/15/23 1447     Code Status (Patient has no pulse and is not breathing):    CPR (Attempt to Resuscitate)     Medical Interventions (Patient has pulse or is breathing):    Full Support       Alba Barroso,   12/19/23

## 2023-12-20 ENCOUNTER — READMISSION MANAGEMENT (OUTPATIENT)
Dept: CALL CENTER | Facility: HOSPITAL | Age: 78
End: 2023-12-20
Payer: MEDICARE

## 2023-12-20 ENCOUNTER — APPOINTMENT (OUTPATIENT)
Dept: GENERAL RADIOLOGY | Facility: HOSPITAL | Age: 78
End: 2023-12-20
Payer: MEDICARE

## 2023-12-20 VITALS
HEIGHT: 66 IN | DIASTOLIC BLOOD PRESSURE: 55 MMHG | WEIGHT: 213.2 LBS | RESPIRATION RATE: 18 BRPM | HEART RATE: 95 BPM | BODY MASS INDEX: 34.27 KG/M2 | OXYGEN SATURATION: 97 % | SYSTOLIC BLOOD PRESSURE: 136 MMHG | TEMPERATURE: 98.2 F

## 2023-12-20 LAB
ACT BLD: 136 SECONDS (ref 82–152)
ACT BLD: 158 SECONDS (ref 82–152)
ACT BLD: 633 SECONDS (ref 82–152)
ACT BLD: 791 SECONDS (ref 82–152)
ACT BLD: 844 SECONDS (ref 82–152)
ANION GAP SERPL CALCULATED.3IONS-SCNC: 10 MMOL/L (ref 5–15)
BASE EXCESS BLDA CALC-SCNC: -1 MMOL/L (ref -5–5)
BASE EXCESS BLDA CALC-SCNC: -2 MMOL/L (ref -5–5)
BASE EXCESS BLDA CALC-SCNC: -2 MMOL/L (ref -5–5)
BASE EXCESS BLDA CALC-SCNC: 0 MMOL/L (ref -5–5)
BASE EXCESS BLDA CALC-SCNC: 1 MMOL/L (ref -5–5)
BASE EXCESS BLDA CALC-SCNC: 1 MMOL/L (ref -5–5)
BUN SERPL-MCNC: 24 MG/DL (ref 8–23)
BUN/CREAT SERPL: 21.4 (ref 7–25)
CA-I BLDA-SCNC: 0.97 MMOL/L (ref 1.2–1.32)
CA-I BLDA-SCNC: 0.99 MMOL/L (ref 1.2–1.32)
CA-I BLDA-SCNC: 1.02 MMOL/L (ref 1.2–1.32)
CA-I BLDA-SCNC: 1.15 MMOL/L (ref 1.2–1.32)
CA-I BLDA-SCNC: 1.22 MMOL/L (ref 1.2–1.32)
CA-I BLDA-SCNC: 1.63 MMOL/L (ref 1.2–1.32)
CALCIUM SPEC-SCNC: 8.1 MG/DL (ref 8.6–10.5)
CHLORIDE SERPL-SCNC: 101 MMOL/L (ref 98–107)
CO2 BLDA-SCNC: 25 MMOL/L (ref 24–29)
CO2 BLDA-SCNC: 25 MMOL/L (ref 24–29)
CO2 BLDA-SCNC: 26 MMOL/L (ref 24–29)
CO2 BLDA-SCNC: 27 MMOL/L (ref 24–29)
CO2 SERPL-SCNC: 24 MMOL/L (ref 22–29)
CREAT SERPL-MCNC: 1.12 MG/DL (ref 0.76–1.27)
EGFRCR SERPLBLD CKD-EPI 2021: 67.2 ML/MIN/1.73
GLUCOSE BLDC GLUCOMTR-MCNC: 132 MG/DL (ref 70–130)
GLUCOSE BLDC GLUCOMTR-MCNC: 139 MG/DL (ref 70–130)
GLUCOSE BLDC GLUCOMTR-MCNC: 146 MG/DL (ref 70–130)
GLUCOSE BLDC GLUCOMTR-MCNC: 156 MG/DL (ref 70–130)
GLUCOSE BLDC GLUCOMTR-MCNC: 180 MG/DL (ref 70–130)
GLUCOSE BLDC GLUCOMTR-MCNC: 184 MG/DL (ref 70–130)
GLUCOSE BLDC GLUCOMTR-MCNC: 207 MG/DL (ref 70–130)
GLUCOSE BLDC GLUCOMTR-MCNC: 230 MG/DL (ref 70–130)
GLUCOSE SERPL-MCNC: 213 MG/DL (ref 65–99)
HCO3 BLDA-SCNC: 23.5 MMOL/L (ref 22–26)
HCO3 BLDA-SCNC: 24 MMOL/L (ref 22–26)
HCO3 BLDA-SCNC: 24.8 MMOL/L (ref 22–26)
HCO3 BLDA-SCNC: 25.9 MMOL/L (ref 22–26)
HCO3 BLDA-SCNC: 26.1 MMOL/L (ref 22–26)
HCO3 BLDA-SCNC: 26.1 MMOL/L (ref 22–26)
HCT VFR BLD AUTO: 25.7 % (ref 37.5–51)
HCT VFR BLDA CALC: 26 % (ref 38–51)
HCT VFR BLDA CALC: 27 % (ref 38–51)
HCT VFR BLDA CALC: 28 % (ref 38–51)
HCT VFR BLDA CALC: 30 % (ref 38–51)
HCT VFR BLDA CALC: 32 % (ref 38–51)
HCT VFR BLDA CALC: 38 % (ref 38–51)
HGB BLD-MCNC: 8.5 G/DL (ref 13–17.7)
HGB BLDA-MCNC: 10.2 G/DL (ref 12–17)
HGB BLDA-MCNC: 10.9 G/DL (ref 12–17)
HGB BLDA-MCNC: 12.9 G/DL (ref 12–17)
HGB BLDA-MCNC: 8.8 G/DL (ref 12–17)
HGB BLDA-MCNC: 9.2 G/DL (ref 12–17)
HGB BLDA-MCNC: 9.5 G/DL (ref 12–17)
PCO2 BLDA: 39.9 MM HG (ref 35–45)
PCO2 BLDA: 43.6 MM HG (ref 35–45)
PCO2 BLDA: 45.7 MM HG (ref 35–45)
PCO2 BLDA: 46.6 MM HG (ref 35–45)
PCO2 BLDA: 47.5 MM HG (ref 35–45)
PCO2 BLDA: 55 MM HG (ref 35–45)
PH BLDA: 7.26 PH UNITS (ref 7.35–7.6)
PH BLDA: 7.34 PH UNITS (ref 7.35–7.6)
PH BLDA: 7.34 PH UNITS (ref 7.35–7.6)
PH BLDA: 7.36 PH UNITS (ref 7.35–7.6)
PH BLDA: 7.36 PH UNITS (ref 7.35–7.6)
PH BLDA: 7.39 PH UNITS (ref 7.35–7.6)
PO2 BLDA: 196 MMHG (ref 80–105)
PO2 BLDA: 368 MMHG (ref 80–105)
PO2 BLDA: 405 MMHG (ref 80–105)
PO2 BLDA: 45 MMHG (ref 80–105)
PO2 BLDA: 70 MMHG (ref 80–105)
PO2 BLDA: 74 MMHG (ref 80–105)
POTASSIUM BLDA-SCNC: 3.2 MMOL/L (ref 3.5–4.9)
POTASSIUM BLDA-SCNC: 3.6 MMOL/L (ref 3.5–4.9)
POTASSIUM BLDA-SCNC: 3.8 MMOL/L (ref 3.5–4.9)
POTASSIUM BLDA-SCNC: 4.1 MMOL/L (ref 3.5–4.9)
POTASSIUM BLDA-SCNC: 4.3 MMOL/L (ref 3.5–4.9)
POTASSIUM BLDA-SCNC: 4.7 MMOL/L (ref 3.5–4.9)
POTASSIUM SERPL-SCNC: 3.7 MMOL/L (ref 3.5–5.2)
SAO2 % BLDA: 100 % (ref 95–98)
SAO2 % BLDA: 77 % (ref 95–98)
SAO2 % BLDA: 94 % (ref 95–98)
SAO2 % BLDA: 94 % (ref 95–98)
SODIUM BLD-SCNC: 136 MMOL/L (ref 138–146)
SODIUM BLD-SCNC: 137 MMOL/L (ref 138–146)
SODIUM BLD-SCNC: 137 MMOL/L (ref 138–146)
SODIUM BLD-SCNC: 139 MMOL/L (ref 138–146)
SODIUM BLD-SCNC: 141 MMOL/L (ref 138–146)
SODIUM BLD-SCNC: 141 MMOL/L (ref 138–146)
SODIUM SERPL-SCNC: 135 MMOL/L (ref 136–145)

## 2023-12-20 PROCEDURE — 99024 POSTOP FOLLOW-UP VISIT: CPT

## 2023-12-20 PROCEDURE — 99232 SBSQ HOSP IP/OBS MODERATE 35: CPT | Performed by: NURSE PRACTITIONER

## 2023-12-20 PROCEDURE — 63710000001 INSULIN DETEMIR PER 5 UNITS: Performed by: STUDENT IN AN ORGANIZED HEALTH CARE EDUCATION/TRAINING PROGRAM

## 2023-12-20 PROCEDURE — 82948 REAGENT STRIP/BLOOD GLUCOSE: CPT

## 2023-12-20 PROCEDURE — 71045 X-RAY EXAM CHEST 1 VIEW: CPT

## 2023-12-20 PROCEDURE — 85018 HEMOGLOBIN: CPT | Performed by: STUDENT IN AN ORGANIZED HEALTH CARE EDUCATION/TRAINING PROGRAM

## 2023-12-20 PROCEDURE — 85014 HEMATOCRIT: CPT | Performed by: STUDENT IN AN ORGANIZED HEALTH CARE EDUCATION/TRAINING PROGRAM

## 2023-12-20 PROCEDURE — 97116 GAIT TRAINING THERAPY: CPT

## 2023-12-20 PROCEDURE — 80048 BASIC METABOLIC PNL TOTAL CA: CPT | Performed by: STUDENT IN AN ORGANIZED HEALTH CARE EDUCATION/TRAINING PROGRAM

## 2023-12-20 PROCEDURE — 63710000001 INSULIN LISPRO (HUMAN) PER 5 UNITS: Performed by: STUDENT IN AN ORGANIZED HEALTH CARE EDUCATION/TRAINING PROGRAM

## 2023-12-20 RX ORDER — CLOPIDOGREL BISULFATE 75 MG/1
75 TABLET ORAL DAILY
Qty: 30 TABLET | Refills: 5 | Status: SHIPPED | OUTPATIENT
Start: 2023-12-21

## 2023-12-20 RX ORDER — TAMSULOSIN HYDROCHLORIDE 0.4 MG/1
0.4 CAPSULE ORAL DAILY
Qty: 30 CAPSULE | Refills: 2 | Status: SHIPPED | OUTPATIENT
Start: 2023-12-21

## 2023-12-20 RX ORDER — ATORVASTATIN CALCIUM 40 MG/1
40 TABLET, FILM COATED ORAL NIGHTLY
Qty: 90 TABLET | Refills: 3 | Status: SHIPPED | OUTPATIENT
Start: 2023-12-20

## 2023-12-20 RX ORDER — POTASSIUM CHLORIDE 20 MEQ/1
20 TABLET, EXTENDED RELEASE ORAL ONCE
Status: COMPLETED | OUTPATIENT
Start: 2023-12-20 | End: 2023-12-20

## 2023-12-20 RX ORDER — METOPROLOL TARTRATE 50 MG/1
50 TABLET, FILM COATED ORAL EVERY 12 HOURS SCHEDULED
Qty: 60 TABLET | Refills: 6 | Status: SHIPPED | OUTPATIENT
Start: 2023-12-20

## 2023-12-20 RX ORDER — PANTOPRAZOLE SODIUM 40 MG/1
40 TABLET, DELAYED RELEASE ORAL
Status: DISCONTINUED | OUTPATIENT
Start: 2023-12-21 | End: 2023-12-20 | Stop reason: HOSPADM

## 2023-12-20 RX ADMIN — INSULIN LISPRO 5 UNITS: 100 INJECTION, SOLUTION INTRAVENOUS; SUBCUTANEOUS at 11:33

## 2023-12-20 RX ADMIN — LEVOTHYROXINE SODIUM 137 MCG: 0.14 TABLET ORAL at 06:10

## 2023-12-20 RX ADMIN — METOPROLOL TARTRATE 50 MG: 50 TABLET ORAL at 08:02

## 2023-12-20 RX ADMIN — POTASSIUM CHLORIDE 20 MEQ: 1500 TABLET, EXTENDED RELEASE ORAL at 10:15

## 2023-12-20 RX ADMIN — TAMSULOSIN HYDROCHLORIDE 0.4 MG: 0.4 CAPSULE ORAL at 08:01

## 2023-12-20 RX ADMIN — INSULIN LISPRO 5 UNITS: 100 INJECTION, SOLUTION INTRAVENOUS; SUBCUTANEOUS at 08:03

## 2023-12-20 RX ADMIN — SENNOSIDES AND DOCUSATE SODIUM 2 TABLET: 8.6; 5 TABLET ORAL at 08:02

## 2023-12-20 RX ADMIN — PANTOPRAZOLE SODIUM 40 MG: 40 INJECTION, POWDER, LYOPHILIZED, FOR SOLUTION INTRAVENOUS at 06:10

## 2023-12-20 RX ADMIN — ASPIRIN 81 MG: 81 TABLET, COATED ORAL at 08:02

## 2023-12-20 RX ADMIN — INSULIN LISPRO 5 UNITS: 100 INJECTION, SOLUTION INTRAVENOUS; SUBCUTANEOUS at 08:02

## 2023-12-20 RX ADMIN — FAMOTIDINE 10 MG: 20 TABLET, FILM COATED ORAL at 08:02

## 2023-12-20 RX ADMIN — Medication 10 ML: at 08:03

## 2023-12-20 RX ADMIN — INSULIN DETEMIR 30 UNITS: 100 INJECTION, SOLUTION SUBCUTANEOUS at 08:03

## 2023-12-20 RX ADMIN — CLOPIDOGREL BISULFATE 75 MG: 75 TABLET ORAL at 08:02

## 2023-12-20 RX ADMIN — FLUTICASONE PROPIONATE 2 SPRAY: 50 SPRAY, METERED NASAL at 08:03

## 2023-12-20 NOTE — THERAPY TREATMENT NOTE
Patient Name: Joni Hoffman  : 1945    MRN: [id number]                              Today's Date: 2023       Admit Date: 2023    Visit Dx:     ICD-10-CM ICD-9-CM   1. NSTEMI (non-ST elevated myocardial infarction)  I21.4 410.70   2. History of diabetes mellitus  Z86.39 V12.29   3. History of hypertension  Z86.79 V12.59   4. History of hyperlipidemia  Z86.39 V12.29   5. History of hypothyroidism  Z86.39 V12.29   6. History of coronary artery disease  Z86.79 V12.59   7. NSTEMI, initial episode of care  I21.4 410.71   8. 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    Recurrent herniation of lumbar disc    Bulging lumbar disc    Actinic keratosis    Chronic heart disease    Encounter for long-term (current) use of insulin    HTN (hypertension)    Senile hyperkeratosis    NSTEMI (non-ST elevated myocardial infarction)    CAD (coronary artery disease)    Hypothyroidism (acquired)    T2DM (type 2 diabetes mellitus)    Nocturnal hypoxia    HLD (hyperlipidemia)     Past Medical History:   Diagnosis Date    Acid indigestion     related to meds    Arthritis     Coronary artery disease     s/p 2 stents after MI     Diabetes mellitus     po meds; checks blood sugars at home run     Disease of thyroid gland     hypothyroidism     Hearing loss     uses hearing aids     History of kidney stones     passed    Hypertension     Lumbar back pain     MI, old     2 stents inserted    Wears glasses     Wears hearing aid      Past Surgical History:   Procedure Laterality Date    APPENDECTOMY      CARDIAC CATHETERIZATION N/A 2023    Procedure: Left Heart Cath;  Surgeon: Elza Mena MD;  Location: [facility] CATH INVASIVE LOCATION;  Service: Cardiology;  Laterality: N/A;    CATARACT EXTRACTION WITH INTRAOCULAR LENS  IMPLANT Bilateral     COLONOSCOPY      CORONARY ARTERY BYPASS GRAFT N/A 12/15/2023    Procedure: MEDIAN STERNOTOMY, CORONARY ARTERY BYPASS X3 WITH INTERNAL MAMMARY ARTERY GRAFT, ENDOVASCULAR VEIN HARVEST OF THE RIGHT GREATER SAPHENOUS VEIN;  Surgeon: Adam Darnell MD;  Location: [facility] OR;  Service: Cardiothoracic;  Laterality: N/A;    CORONARY STENT PLACEMENT  2000    x2    INTERVENTIONAL RADIOLOGY PROCEDURE N/A 12/31/2019    Procedure: myelogram lumbar spine;  Surgeon: Karan Pennington MD;  Location: [facility] CATH INVASIVE LOCATION;  Service: Interventional Radiology    LUMBAR DISCECTOMY Left 10/27/2017    Procedure: LUMBAR DISCECTOMY L5-S1 LEFT ;  Surgeon: Elbert De Anda MD;  Location: [facility] OR;  Service:     LUMBAR DISCECTOMY Left 2/19/2020    Procedure: RE-DO LUMBAR DISCECTOMY L5-S1 LEFT;  Surgeon: Elbert De Anda MD;  Location: [facility] OR;  Service: Neurosurgery;  Laterality: Left;    NASAL POLYP SURGERY        General Information       Row Name 12/20/23 0948          Physical Therapy Time and Intention    Document Type therapy note (daily note)  -SC     Mode of Treatment physical therapy  -SC       Row Name 12/20/23 6385          General Information    Patient Profile Reviewed yes  -SC     Existing Precautions/Restrictions cardiac;sternal;fall  -SC       Row Name 12/20/23 0948          Cognition    Orientation Status (Cognition) oriented x 3  -SC       Row Name 12/20/23 0954          Safety Issues, Functional Mobility    Impairments Affecting Function (Mobility) balance;coordination;endurance/activity tolerance;shortness of breath  -SC     Comment, Safety Issues/Impairments (Mobility) alert, following direction  -SC               User Key  (r) = Recorded By, (t) = Taken By, (c) = Cosigned By      Initials Name Provider Type    SC Dora Brenner PT Physical Therapist                   Mobility       Row Name 12/20/23 6149          Bed Mobility    Comment, (Bed Mobility) uic  -SC       Row Name 12/20/23  0949          Transfers    Comment, (Transfers) able to get up wihout pushing off chair  -SC       Row Name 12/20/23 0949          Sit-Stand Transfer    Sit-Stand [place] (Transfers) standby assist  -SC     Assistive Device (Sit-Stand Transfers) walker, front-wheeled  -SC       Row Name 12/20/23 0949          Gait/Stairs (Locomotion)    [place] (Gait) standby assist  -SC     Assistive Device (Gait) walker, front-wheeled  -SC     Distance in Feet (Gait) 140+140  -SC     Deviations/Abnormal Patterns (Gait) bilateral deviations;elton decreased;gait speed decreased;stride length decreased  -SC     Bilateral Gait Deviations forward flexed posture;weight shift ability decreased  -SC     Comment, (Gait/Stairs) cue to stay close to wealker. Cues for upright posture. one sit down break. No lob  -SC               User Key  (r) = Recorded By, (t) = Taken By, (c) = Cosigned By      Initials Name Provider Type    SC Dora Brenner PT Physical Therapist                   Obj/Interventions       Row Name 12/20/23 0950          Motor Skills    Therapeutic Exercise hip;shoulder  -[facility] Name 12/20/23 0950          Shoulder (Therapeutic Exercise)    Shoulder (Therapeutic Exercise) AROM (active range of motion)  chicken wings, rowing x10  -[facility] Name 12/20/23 0950          Hip (Therapeutic Exercise)    Hip (Therapeutic Exercise) AROM (active range of motion)  -SC     Hip AROM (Therapeutic Exercise) extension;sitting;standing;10 repetitions  -[facility] Name 12/20/23 0950          Knee (Therapeutic Exercise)    Knee (Therapeutic Exercise) AROM (active range of motion)  -SC     Knee Strengthening (Therapeutic Exercise) bilateral;flexion;extension;sitting;20 repititions  -[facility] Name 12/20/23 0950          Balance    Dynamic Standing Balance 1-person assist;standby assist  -SC     Position/Device Used, Standing Balance supported;walker, rolling  -SC     Comment, Balance no LOB  -SC                User Key  (r) = Recorded By, (t) = Taken By, (c) = Cosigned By      Initials Name Provider Type    SC Dora Brenner, PT Physical Therapist                   Goals/Plan    No documentation.                  Clinical Impression       Row Name 12/20/23 0952          Pain    Additional Documentation Pain Scale: FACES Pre/Post-Treatment (Group)  -SC       Row Name 12/20/23 0952          Pain Scale: FACES Pre/Post-Treatment    Pain: FACES Scale, Pretreatment 2-->hurts little bit  -SC     Posttreatment Pain Rating 2-->hurts little bit  -SC     Pre/Posttreatment Pain Comment incisional  -SC       Row Name 12/20/23 0952          Plan of Care Review    Plan of Care Reviewed With patient  -SC     Progress improving  -SC     Outcome Evaluation Pateient demonstrated safe mobility in hallway. Vitals stable at documented  -SC       Row Name 12/20/23 0952          Therapy Assessment/Plan (PT)    Rehab Potential (PT) good, to achieve stated therapy goals  -SC     Criteria for Skilled Interventions Met (PT) yes;skilled treatment is necessary  -SC     Therapy Frequency (PT) daily  -SC       Row Name 12/20/23 0952          Vital Signs    Pre Systolic BP Rehab 158  -SC     Pre Treatment Diastolic BP 70  -SC     Posttreatment Heart Rate (beats/min) 94  -SC     Post SpO2 (%) 97  -SC     O2 Delivery Post Treatment room air  -[facility] 12/20/23 0952          Positioning and Restraints    Pre-Treatment Position sitting in chair/recliner  -SC     Post Treatment Position chair  -SC     In Chair notified nsg;reclined;sitting;call light within reach;encouraged to call for assist  -SC               User Key  (r) = Recorded By, (t) = Taken By, (c) = Cosigned By      Initials Name Provider Type    SC Dora Brenner, PT Physical Therapist                   Outcome Measures       [facility] Name 12/20/23 0957          How much help from another person do you currently need...    Turning from your back to your side while in flat bed without using  bedrails? 3  -SC     Moving from lying on back to sitting on the side of a flat bed without bedrails? 3  -SC     Moving to and from a bed to a chair (including a wheelchair)? 3  -SC     Standing up from a chair using your arms (e.g., wheelchair, bedside chair)? 3  -SC     Climbing 3-5 steps with a railing? 3  -SC     To walk in hospital room? 3  -SC     AM-PAC 6 Clicks Score (PT) 18  -SC     Highest Level of Mobility Goal 6 --> Walk 10 steps or more  -SC       Row Name 12/20/23 0957          Functional Assessment    Outcome Measure Options AM-PAC 6 Clicks Basic Mobility (PT)  -SC               User Key  (r) = Recorded By, (t) = Taken By, (c) = Cosigned By      Initials Name Provider Type    Dora Lezama, PT Physical Therapist                                 Physical Therapy Education       Title: PT OT SLP Therapies (Done)       Topic: Physical Therapy (Done)       Point: Mobility training (Done)       Learning Progress Summary             Patient Eager, E, VU,DU by SC at 12/20/2023 0957    Comment: reviewed HEP and chest precautions    Acceptance, E, NR by AS at 12/19/2023 1416    Acceptance, E, VU by AE at 12/18/2023 0820    Acceptance, E, VU,NR by DEO at 12/16/2023 1428                         Point: Home exercise program (Done)       Learning Progress Summary             Patient Eager, E, VU,DU by SC at 12/20/2023 0957    Comment: reviewed HEP and chest precautions    Acceptance, E, NR by AS at 12/19/2023 1416                         Point: Body mechanics (Done)       Learning Progress Summary             Patient Eager, E, VU,DU by SC at 12/20/2023 0957    Comment: reviewed HEP and chest precautions    Acceptance, E, NR by AS at 12/19/2023 1416    Acceptance, E, VU by AE at 12/18/2023 0820    Acceptance, E, VU,NR by DEO at 12/16/2023 1428                         Point: Precautions (Done)       Learning Progress Summary             Patient Eager, E, VU,DU by SC at 12/20/2023 0957    Comment: reviewed HEP and  chest precautions    Acceptance, E, NR by AS at 12/19/2023 1416    Acceptance, E, VU by AE at 12/18/2023 0820    Acceptance, E, VU,NR by DEO at 12/16/2023 1428                                         User Key       Initials Effective Dates Name Provider Type Discipline    SC 02/03/23 -  Dora Brenner, PT Physical Therapist PT    AS 04/28/23 -  Celeste Morin, PTA Physical Therapist Assistant PT    DEO 06/16/21 -  Vicky Cornell, KARISHMA Physical Therapist PT    AE 09/21/21 -  Drake Kahn, PT Physical Therapist PT                  PT Recommendation and Plan     Plan of Care Reviewed With: patient  Progress: improving  Outcome Evaluation: Pateient demonstrated safe mobility in hallway. Vitals stable at documented     Time Calculation:         PT Charges       Row Name 12/20/23 0921             Time Calculation    Start Time 0921  -SC      PT Received On 12/20/23  -SC      PT Goal Re-Cert Due Date 12/26/23  -SC         Timed Charges    92912 - PT Therapeutic Exercise Minutes 8  -SC      28561 - Gait Training Minutes  10  -SC         Total Minutes    Timed Charges Total Minutes 18  -SC       Total Minutes 18  -SC                User Key  (r) = Recorded By, (t) = Taken By, (c) = Cosigned By      Initials Name Provider Type    SC Dora Brenner, PT Physical Therapist                  Therapy Charges for Today       Code Description Service Date Service Provider Modifiers Qty    84115580352 HC GAIT TRAINING EA 15 MIN 12/20/2023 Dora Brenner PT GP 1            PT G-Codes  Outcome Measure Options: AM-PAC 6 Clicks Basic Mobility (PT)  AM-PAC 6 Clicks Score (PT): 18  PT Discharge Summary  Anticipated Discharge Disposition (PT): home with assist    Dora Brenner, PT  12/20/2023

## 2023-12-20 NOTE — PROGRESS NOTES
"Enter Query Response Below      Query Response  type II             If applicable, please update the problem list.   Patient: Joni Hoffman        : 1945  Account: [id number]           Admit Date:         How to Respond to this query:       a. Click New Note     b. Answer query within the yellow box.                c. Update the Problem List, if applicable.      If you have any questions about this query contact me at: [email or web address]     :    Patient admitted on  with a NSTEMI.  HS Troponin T 124, 140, 154, Troponin T Delta 16.   EKG \"Normal sinus rhythm, Nonspecific T wave abnormality, Abnormal ECG, When compared with ECG of 2020 10:46, Non-specific change in ST segment in Anterior leads, Nonspecific T wave abnormality now evident in Anterior leads\".   Per Heart cath report \"LAD is heavily calcified with severe in-stent restenosis with NATTY II flow. First diagonal appears to be draining in AV fistula. Circumflex first OM has about 80% disease. Mid RCA has 70 to 80% disease\".    Please clarify the type of MI the patient was treated/monitored for:    MI due to in-stent restenosis.  Type 1 MI due to ______ (please specify- plaque erosion, rupture, fissure or thrombus).  Type 2 MI due to fixed CAD.  Other____________.  Unable to determine.    By submitting this query, we are merely seeking further clarification of documentation to accurately reflect all conditions that you are monitoring, evaluating, treating or that extend the hospitalization or utilize additional resources of care. Please utilize your independent clinical judgment when addressing the question(s) above.     This query and your response, once completed, will be entered into the legal medical record.    Sincerely,  Kyra Harris RN  Clinical Documentation Integrity Program     " yes

## 2023-12-20 NOTE — PROGRESS NOTES
" [facility] - Progress Note    Joni Hoffman  1945  [id number]/1    12/20/23, 09:56 EST      Chief Complaint: Following for Post Op managemnet of BP, rhythm    Subjective:   Awake in bedside, wife present.  Patient reports he is doing well without complaints of dyspnea or chest pain.  Pain is well-controlled.  He has been walking around the entire unit without difficulty.  He would like to go home today.    Review of Systems:  Pertinent positives are listed above and in physical exam.  All others have been reviewed and are negative.    acetaminophen, 650 mg, Oral, Q8H  aspirin, 81 mg, Oral, Daily  atorvastatin, 40 mg, Oral, Nightly  clopidogrel, 75 mg, Oral, Daily  famotidine, 10 mg, Oral, Daily  fluticasone, 2 spray, Each Nare, Daily  insulin detemir, 30 Units, Subcutaneous, Daily  insulin lispro, 3-14 Units, Subcutaneous, 4x Daily AC & at Bedtime  Insulin Lispro, 5 Units, Subcutaneous, TID With Meals  levothyroxine, 137 mcg, Oral, Q AM  melatonin, 5 mg, Oral, Nightly  metoprolol tartrate, 50 mg, Oral, Q12H  montelukast, 10 mg, Oral, Nightly  [START ON 12/21/2023] pantoprazole, 40 mg, Oral, QAM AC  pharmacy consult - [place], , Does not apply, Daily  potassium chloride ER, 20 mEq, Oral, Once  senna-docusate sodium, 2 tablet, Oral, BID  sodium chloride, 10 mL, Intravenous, Q12H  sodium chloride, 10 mL, Intravenous, Q12H  tamsulosin, 0.4 mg, Oral, Daily        Objective:  Vitals:   height is 167.6 cm (65.98\") and weight is 96.7 kg (213 lb 3.2 oz). His oral temperature is 98.2 °F (36.8 °C). His blood pressure is 158/70 and his pulse is 111. His respiration is 18 and oxygen saturation is 95%.     Intake/Output Summary (Last 24 hours) at 12/20/2023 0956  Last data filed at 12/20/2023 0700  Gross per 24 hour   Intake --   Output 0 ml   Net 0 ml       Physical Exam:  General:  WN, NAD, A and O x3.  CV:  Normal S1,S2. No murmur, rub, or gallop.  Resp:  CTA Behzad, equal, nonlabored.  Abd:  Soft, + BS, no " organomegaly. Nontender to palpation.  Extrem:  No edema BLE, 2+ pedal/PT pulses.            Results from last 7 days   Lab Units 12/20/23  0604 12/19/23  0618 12/18/23  0441   WBC 10*3/mm3  --   --  9.73   HEMOGLOBIN g/dL 8.5*   < > 8.1*   HEMATOCRIT % 25.7*   < > 24.7*   PLATELETS 10*3/mm3  --   --  147    < > = values in this interval not displayed.     Results from last 7 days   Lab Units 12/20/23  0604 12/17/23  1437 12/17/23  0353   SODIUM mmol/L 135*   < > 134*   POTASSIUM mmol/L 3.7   < > 3.7   CHLORIDE mmol/L 101   < > 100   CO2 mmol/L 24.0   < > 24.0   BUN mg/dL 24*   < > 17   CREATININE mg/dL 1.12   < > 1.37*   CALCIUM mg/dL 8.1*   < > 8.6   BILIRUBIN mg/dL  --   --  0.4   ALK PHOS U/L  --   --  45   ALT (SGPT) U/L  --   --  11   AST (SGOT) U/L  --   --  33   GLUCOSE mg/dL 213*   < > 139*    < > = values in this interval not displayed.     Results from last 7 days   Lab Units 12/16/23  0304 12/15/23  1502   INR  1.20* 1.19*                         Tele:  NSR/Sinus Tachycardia      Assessment:  -78-year-old CM with past medical history of CAD, HTN, HLP, DM 2 presents to local hospital after 2 to 3-day history of exertional chest pain and dyspnea with normal EKG, elevated cardiac markers.  Repeat serial troponins here at [facility] are also elevated, findings consistent with NSTEMI. MV CAD by [facility] 12/13/23.  -  CABG 12/15/2023  -CAD, remote stenting  -HTN  -HLP  -DM 2, A1C  7  -Hypothyroidism on chronic supplementation  -BPH           Plan:  -Admitted to hospitalist services.    -Continue low-dose aspirin, statin.  He was on Repatha at home.    -  Plavix per CTS.  -Continue Lopressor to 50mg BID  -Continue Synthroid  -Cardiology will f sign off.  Okay for discharge from cardiology standpoint.  Patient will follow-up with CTS as directed and with cardiology in 1 month.  He does have an appointment with Dr. Dill already scheduled for January 2024 and should keep this.    Jennifer Granger PA-C working with Magan  MD [place]  12/19/2023  10:12 a.m.

## 2023-12-20 NOTE — PROGRESS NOTES
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 VSS on room air.     Objective    Vital Signs  Temp:  [98.2 °F (36.8 °C)-98.9 °F (37.2 °C)] 98.2 °F (36.8 °C)  Heart Rate:  [] 91  Resp:  [18-26] 18  BP: (107-129)/(55-67) 119/67    Physical Exam:   General Appearance: alert, appears stated age and cooperative   Lungs: clear to auscultation     Heart: regular rhythm & normal rate, normal S1, S2 and no murmur, no gallop, no rub   Chest: Sternum stable   Skin: Incision c/d/i     Results     Results from last 7 days   Lab Units 12/20/23  0604 12/19/23  0618 12/18/23  0441   WBC 10*3/mm3  --   --  9.73   HEMOGLOBIN g/dL 8.5*   < > 8.1*   HEMATOCRIT % 25.7*   < > 24.7*   PLATELETS 10*3/mm3  --   --  147    < > = values in this interval not displayed.     Results from last 7 days   Lab Units 12/19/23  0618   SODIUM mmol/L 138   POTASSIUM mmol/L 4.0   CHLORIDE mmol/L 104   CO2 mmol/L 24.0   BUN mg/dL 23   CREATININE mg/dL 1.13   GLUCOSE mg/dL 171*   CALCIUM mg/dL 8.7       Assessment    NSTEMI (non-ST elevated myocardial infarction)    HTN (hypertension)    CAD (coronary artery disease)    Hypothyroidism (acquired)    T2DM (type 2 diabetes mellitus)    Nocturnal hypoxia    HLD (hyperlipidemia)      Plan   Ambulate  Pulm toilet  PT/OT  No narcotics  ASA, Statin, BB, Plavix  D/C home today if cardiology is in agreement    Rachel Lambert, APRN  12/20/23  07:57 EST

## 2023-12-20 NOTE — PLAN OF CARE
Goal Outcome Evaluation:  Plan of Care Reviewed With: patient        Progress: improving  Outcome Evaluation: Pateient demonstrated safe mobility in hallway. Vitals stable at documented      Anticipated Discharge Disposition (PT): home with assist

## 2023-12-20 NOTE — PROGRESS NOTES
[facility] Medicine Services  PROGRESS NOTE    Patient Name: Joni Hoffman  : 1945  MRN: [id number]    Date of Admission: 2023  Primary Care Physician: Little Barker MD    Subjective   Subjective     CC:  ICU transfer/med mgmt     HPI:  Seen resting up in the chair no acute distress.  Awake and alert.  Wife at bedside.  Patient is pleasant and interactive.  Fully dressed.  States he gets to go home today.  Denies current pain, nausea, vomiting.  Only rare dry throat cough.  No new issues      Objective   Objective     Vital Signs:   Temp:  [98.2 °F (36.8 °C)-98.7 °F (37.1 °C)] 98.2 °F (36.8 °C)  Heart Rate:  [] 91  Resp:  [18-26] 18  BP: (107-158)/(55-70) 158/70  Flow (L/min):  [1] 1     Physical Exam:   Constitutional: Awake, alert, resting comfortably up in the chair.  Wife at bedside.  No acute distress.  HENT: NCAT, mucous membranes moist  Respiratory: Clear to auscultation bilaterally but decreased at bases, respiratory effort normal on room air.  Sats WNL.  Cardiovascular: RRR, no murmurs, rubs, or gallops  Gastrointestinal: Positive bowel sounds, soft, nontender, nondistended.  Obese.  Musculoskeletal: Trace BLE edema.  BEJARANO spontaneously.  Neurologic: Alert and oriented x 3, no focal deficits, speech clear and appropriate.  Follows commands.  Skin: No rashes.  Midline vertical sternal incision open to air dry and intact.  Old MT sites open to air dry and intact.  Right lower extremity VH sites open to air dry and intact.      Results Reviewed:  LAB RESULTS:      Lab 23  0604 23  0618 23  0441 23  0353 23  0304 12/15/23  1831 12/15/23  1743 12/15/23  1502 23  0550 23  1830   WBC  --   --  9.73 11.56* 12.78* 14.03*  --  13.92*   < >  --    HEMOGLOBIN 8.5* 8.7* 8.1* 8.2* 9.2* 9.4*   < > 10.5*   < >  --    HEMOGLOBIN, POC  --   --   --   --   --   --   --   --    < >  --    HEMATOCRIT 25.7* 26.1* 24.7* 25.3* 28.5*  28.7*   < > 31.8*   < >  --    HEMATOCRIT POC  --   --   --   --   --   --   --   --    < >  --    PLATELETS  --   --  147 125* 165 176  --  174   < >  --    NEUTROS ABS  --   --  7.25*  --  10.60*  --   --   --   --   --    IMMATURE GRANS (ABS)  --   --  0.03  --  0.06*  --   --   --   --   --    LYMPHS ABS  --   --  1.09  --  0.74  --   --   --   --   --    MONOS ABS  --   --  1.11*  --  1.33*  --   --   --   --   --    EOS ABS  --   --  0.21  --  0.02  --   --   --   --   --    MCV  --   --  96.1 95.5 96.6 95.3  --  94.9   < >  --    PROTIME  --   --   --   --  15.4*  --   --  15.2*  --   --    APTT  --   --   --   --   --   --   --  30.7  --   --    HEPARIN ANTI-XA  --   --   --   --   --   --   --   --   --  0.10*    < > = values in this interval not displayed.         Lab 12/20/23  0604 12/19/23  0618 12/18/23  0441 12/17/23  1437 12/17/23  0353 12/16/23  0304 12/15/23  1831 12/15/23  1502 12/14/23  0550   SODIUM 135* 138 137  --  134* 142 143 143 140   POTASSIUM 3.7 4.0 4.1 4.2 3.7 4.3 4.0 3.5 4.1   CHLORIDE 101 104 103  --  100 107 107 108* 105   CO2 24.0 24.0 25.0  --  24.0 23.0 24.0 25.0 26.0   ANION GAP 10.0 10.0 9.0  --  10.0 12.0 12.0 10.0 9.0   BUN 24* 23 23  --  17 15 15 15 18   CREATININE 1.12 1.13 1.06  --  1.37* 1.62* 1.25 1.19 1.06   EGFR 67.2 66.5 71.8  --  52.8* 43.2* 58.9* 62.5 71.8   GLUCOSE 213* 171* 210*  --  139* 155* 181* 137* 146*   CALCIUM 8.1* 8.7 8.3*  --  8.6 8.5* 8.8 8.8 8.6   IONIZED CALCIUM  --   --   --   --   --   --   --  1.28  --    MAGNESIUM  --   --   --   --   --  2.0 2.0 2.3 1.7   PHOSPHORUS  --   --   --   --   --   --  2.9 2.7  --          Lab 12/17/23  0353 12/16/23  0304 12/15/23  1831 12/15/23  1502   TOTAL PROTEIN 5.5* 5.3*  --   --    ALBUMIN 3.5 4.2 4.4 3.9   GLOBULIN 2.0 1.1  --   --    ALT (SGPT) 11 21  --   --    AST (SGOT) 33 48*  --   --    BILIRUBIN 0.4 0.2  --   --    ALK PHOS 45 43  --   --          Lab 12/16/23  0304 12/15/23  1502   PROTIME 15.4* 15.2*    INR 1.20* 1.19*               Lab 12/13/23  1830   ABO TYPING O   RH TYPING Negative   ANTIBODY SCREEN Negative         Lab 12/15/23  1903 12/15/23  1546 12/15/23  1310   PH, ARTERIAL 7.376 7.349* 7.34*   PCO2, ARTERIAL 40.8 44.8  --    PO2 ART 73.7* 171.0*  --    FIO2 40 21  --    HCO3 ART 23.9 24.6  --    BASE EXCESS ART -1.2* -1.1*  --    CARBOXYHEMOGLOBIN 1.4 1.1  --      Brief Urine Lab Results       None            Microbiology Results Abnormal       None            XR Chest 1 View    Result Date: 12/20/2023  XR CHEST 1 VW Date of Exam: 12/20/2023 9:07 AM EST Indication: postop Comparison: Chest x-ray 12/17/2023 Findings: Removal of right IJ CVC and thoracostomy tubes. Stable cardiomediastinal silhouette within normal limits. Right greater than left bibasilar parenchymal opacities likely reflect some atelectasis. Stable small left-sided pleural effusion. No definite pneumothorax.     Impression: Impression: Removal of right IJ CVC and thoracostomy tubes. Stable cardiomediastinal silhouette within normal limits. Right greater than left bibasilar parenchymal opacities likely reflect some atelectasis. Stable small left-sided pleural effusion. No definite pneumothorax. Electronically Signed: Quique Caal MD  12/20/2023 9:22 AM EST  Workstation ID: [id number]     Results for orders placed during the hospital encounter of 12/12/23    Adult Transthoracic Echocardiogram Complete    Interpretation Summary    Left ventricular systolic function is normal. Calculated left ventricular EF of 64%.  There is hypokinesis of the anteroseptum, inferoseptum, and apex    Left ventricular diastolic function was normal.    Normal right ventricular size and systolic function    No significant valvular abnormality    No pericardial effusion    No LV thrombus on contrasted images    No prior echocardiogram for comparison      Current medications:  Scheduled Meds:acetaminophen, 650 mg, Oral, Q8H  aspirin, 81 mg, Oral,  Daily  atorvastatin, 40 mg, Oral, Nightly  clopidogrel, 75 mg, Oral, Daily  famotidine, 10 mg, Oral, Daily  fluticasone, 2 spray, Each Nare, Daily  insulin detemir, 30 Units, Subcutaneous, Daily  insulin lispro, 3-14 Units, Subcutaneous, 4x Daily AC & at Bedtime  Insulin Lispro, 5 Units, Subcutaneous, TID With Meals  levothyroxine, 137 mcg, Oral, Q AM  melatonin, 5 mg, Oral, Nightly  metoprolol tartrate, 50 mg, Oral, Q12H  montelukast, 10 mg, Oral, Nightly  [START ON 12/21/2023] pantoprazole, 40 mg, Oral, QAM AC  pharmacy consult - [place], , Does not apply, Daily  senna-docusate sodium, 2 tablet, Oral, BID  sodium chloride, 10 mL, Intravenous, Q12H  sodium chloride, 10 mL, Intravenous, Q12H  tamsulosin, 0.4 mg, Oral, Daily      Continuous Infusions:     PRN Meds:.  acetaminophen    senna-docusate sodium **AND** polyethylene glycol **AND** bisacodyl **AND** bisacodyl    Calcium Replacement - Follow Nurse / BPA Driven Protocol    dextrose    dextrose    glucagon (human recombinant)    ipratropium-albuterol    Magnesium Cardiology Dose Replacement - Follow Nurse / BPA Driven Protocol    nitroglycerin    nitroglycerin    ondansetron    Phosphorus Replacement - Follow Nurse / BPA Driven Protocol    Potassium Replacement - Follow Nurse / BPA Driven Protocol    sodium chloride    sodium chloride    sodium chloride    Assessment & Plan   Assessment & Plan     Active Hospital Problems    Diagnosis  POA    **NSTEMI (non-ST elevated myocardial infarction) [I21.4]  Yes    Nocturnal hypoxia [G47.34]  Yes    HLD (hyperlipidemia) [E78.5]  Yes    CAD (coronary artery disease) [I25.10]  Yes    Hypothyroidism (acquired) [E03.9]  Yes    T2DM (type 2 diabetes mellitus) [E11.9]  Yes    HTN (hypertension) [I10]  Yes      Resolved Hospital Problems    Diagnosis Date Resolved POA    NSTEMI, initial episode of care [I21.4] 12/15/2023 Yes        Brief Hospital Course to date:  Joni Hoffman is a 78 y.o. male with a past medical history of  CAD with prior stents, hypertension, hyperlipidemia, type 2 diabetes mellitus and hypothyroidism who presented to the ED with complaint of chest pain. Was found to have NSTEMI, underwent cardiac catheterization which showed multivessel CAD. CT surgery was consulted for CABG evaluation. Underwent CABG x 3 on 12/15/2023. Was admitted to the ICU after surgery. Was transferred out of the ICU to telemetry floor on 12/18.    This patient's problems and plans were partially entered by my partner and updated as appropriate by me 12/20/23.    Assessment/Plan:  Pt is new to me today     NSTEMI  Multivessel CAD  - s/p C 12/13 which showed multivessel coronary artery disease  - s/p CABG x 3 on 12/15 performed by Dr. Darnell  - Cardiothoracic surgery and Cardiology following.  - Continue aspirin, Plavix, statin daily. PT/OT following. Possible discharge home tomorrow.  -- Postop care and medications per CTS and cards.  -- Will discharge home today per primary service.  Follow-ups as per their notes.    Diabetes mellitus type 2  - A1c 7% this admission   - required insulin gtt during/after CABG  - Continue Levemir 30 units nightly with lispro 5 units 3 times daily and moderate dose corrective scale insulin. Continue to monitor and adjust insulin if needed.  -- Patient states he will resume his home regimen of 70/30 insulin on discharge.  He will follow-up closely with his PCP for further management.    Hypothyroidism  - Continue levothyroxine.    BPH  - Continue tamsulosin    Patient seen resting up in chair no acute distress.  Hemodynamically stable and afebrile.  Cleared from hospitalist standpoint for discharge home with close follow-up PCP in 1 week for further management of chronic medical issues.      Expected Discharge Location and Transportation: Home  Expected Discharge   Expected Discharge Date: 12/20/2023; Expected Discharge Time:      DVT prophylaxis:  Mechanical DVT prophylaxis orders are present.     AM-PAC 6  Clicks Score (PT): 18 (12/20/23 7657)    CODE STATUS:   Code Status and Medical Interventions:   Ordered at: 12/15/23 8709     Code Status (Patient has no pulse and is not breathing):    CPR (Attempt to Resuscitate)     Medical Interventions (Patient has pulse or is breathing):    Full Support       Marichuy Hartley, APRN  12/20/23

## 2023-12-21 NOTE — OUTREACH NOTE
Prep Survey      Flowsheet Row Responses   [facility] facility patient discharged from? [place]   Is LACE score < 7 ? No   Eligibility Readm Mgmt   Discharge diagnosis NSTEMI   Does the patient have one of the following disease processes/diagnoses(primary or secondary)? Acute MI (STEMI,NSTEMI)   Does the patient have Home health ordered? No   Is there a DME ordered? No   Prep survey completed? Yes            ROBIN AHUMADA - Registered Nurse

## 2023-12-21 NOTE — DISCHARGE SUMMARY
[facility] Cardiothoracic Surgery  DISCHARGE SUMMARY    Patient Name: Joni Hoffman  : 1945  MRN: [id number]    Date of Admission: 2023  8:23 PM  Date of Discharge:  2023  Primary Care Physician: Little Barker MD  Referred By: No ref. provider found    IP Care Team:  Patient Care Team:  Little Barker MD as PCP - General (Internal Medicine)  Little Barker MD as Referring Physician (Internal Medicine)  Bry Dill MD as Consulting Physician (Cardiology)    Consults       Date and Time Order Name Status Description    12/15/2023  2:48 PM Inpatient Consult to Cardiology Completed     2023  1:51 PM Inpatient Cardiothoracic Surgery Consult Completed     2023 12:25 AM Inpatient Cardiology Consult Completed             Hospital Course     Presenting Problem: Coronary artery disease    Active Hospital Problems    Diagnosis  POA    **NSTEMI (non-ST elevated myocardial infarction) [I21.4]  Yes    Nocturnal hypoxia [G47.34]  Yes    HLD (hyperlipidemia) [E78.5]  Yes    CAD (coronary artery disease) [I25.10]  Yes    Hypothyroidism (acquired) [E03.9]  Yes    T2DM (type 2 diabetes mellitus) [E11.9]  Yes    HTN (hypertension) [I10]  Yes      Resolved Hospital Problems    Diagnosis Date Resolved POA    NSTEMI, initial episode of care [I21.4] 12/15/2023 Yes        Procedures Performed:  Procedure(s):  MEDIAN STERNOTOMY, CORONARY ARTERY BYPASS X3 WITH INTERNAL MAMMARY ARTERY GRAFT, ENDOVASCULAR VEIN HARVEST OF THE RIGHT GREATER SAPHENOUS VEIN       Hospital Course:  Joni Hoffman is a 78 y.o. male who was seen in the operating room on 12/15/2023 and underwent coronary artery bypass grafting x 3 with EVH of the right greater saphenous vein with Dr. Darnell.  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 Mcmanus which looked like this area had a previous stent that was placed in the left anterior descending coronary with subsequent rupture or communication to an adjacent vein.  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 He was taken to ICU in stable condition and was extubated per ICU protocol.  He met discharge criteria and was discharged in stable condition on POD #5.  His hospital course is below.    CAD s/p CABG x 3:  12/18: Chest tubes and pacing wires removed.  He was started on Plavix.  Transfer to telemetry orders were placed.  12/19: Transferred to telemetry floor.  ASA, Statin, BB, Plavix     Discharge Follow Up Recommendations for outpatient labs/diagnostics:  Follow-up with PCP 1 to 2 weeks  Follow-up with CT surgery in 2 to 4 weeks  Follow-up with cardiology in 4 weeks        DO NOT REMOVE SUTURES AND/OR STAPLES THIS WILL BE ADDRESSED AT CT SURGERY FOLLOW-UP  If you have any questions or concerns please reach out to our office at [phone]    Day of Discharge     HPI:   Patient is a 78 y.o. male with hypertension, hyperlipidemia, type 2 diabetes treated with insulin, remote history of smoking tobacco and known coronary disease.  He has had a stent placed in 2000.  Recently developed a new onset of chest pain while going upstairs.  Symptoms returned on occasion over the next couple days.  He sought care at his primary care physician who did an EKG and some blood work.  EKG and blood work were reportedly abnormal and he was told to report to an outside hospital.  He presented and was observed and then discharged.  He self presented at the [facility] emergency department on 12/12/2023 where he was again admitted by the hospitalist service for observation.  Cardiology consultation obtained.  After evaluating the patient was felt  the patient would need further investigation with cardiac catheterization.  That was performed on 12/23/2023 and was found to have significant three-vessel coronary disease.  CT surgery's been consulted for CABG evaluation.       Vital Signs:   Temp:  [98.2 °F (36.8 °C)] 98.2 °F (36.8 °C)  Heart Rate:  [91-95] 95  Resp:  [18] 18  BP: (136)/(55) 136/55      Physical Exam:  General Appearance: alert, appears stated age and cooperative              Lungs: clear to auscultation                Heart: regular rhythm & normal rate, normal S1, S2 and no murmur, no gallop, no rub              Chest: Sternum stable              Skin: Incision c/d/i    Pertinent  and/or Most Recent Results     LAB RESULTS:          Lab 12/20/23  0604 12/19/23  0618 12/18/23  0441 12/17/23  0353 12/16/23  0304 12/15/23  1831 12/15/23  1743 12/15/23  1502   WBC  --   --  9.73 11.56* 12.78* 14.03*  --  13.92*   HEMOGLOBIN 8.5* 8.7* 8.1* 8.2* 9.2* 9.4*   < > 10.5*   HEMATOCRIT 25.7* 26.1* 24.7* 25.3* 28.5* 28.7*   < > 31.8*   PLATELETS  --   --  147 125* 165 176  --  174   NEUTROS ABS  --   --  7.25*  --  10.60*  --   --   --    IMMATURE GRANS (ABS)  --   --  0.03  --  0.06*  --   --   --    LYMPHS ABS  --   --  1.09  --  0.74  --   --   --    MONOS ABS  --   --  1.11*  --  1.33*  --   --   --    EOS ABS  --   --  0.21  --  0.02  --   --   --    MCV  --   --  96.1 95.5 96.6 95.3  --  94.9   PROTIME  --   --   --   --  15.4*  --   --  15.2*   APTT  --   --   --   --   --   --   --  30.7    < > = values in this interval not displayed.         Lab 12/20/23  0604 12/19/23  0618 12/18/23  0441 12/17/23  1437 12/17/23  0353 12/16/23  0304 12/15/23  1831 12/15/23  1502   SODIUM 135* 138 137  --  134* 142 143 143   POTASSIUM 3.7 4.0 4.1 4.2 3.7 4.3 4.0 3.5   CHLORIDE 101 104 103  --  100 107 107 108*   CO2 24.0 24.0 25.0  --  24.0 23.0 24.0 25.0   ANION GAP 10.0 10.0 9.0  --  10.0 12.0 12.0 10.0   BUN 24* 23 23  --  17 15 15 15   CREATININE 1.12 1.13 1.06   --  1.37* 1.62* 1.25 1.19   EGFR 67.2 66.5 71.8  --  52.8* 43.2* 58.9* 62.5   GLUCOSE 213* 171* 210*  --  139* 155* 181* 137*   CALCIUM 8.1* 8.7 8.3*  --  8.6 8.5* 8.8 8.8   IONIZED CALCIUM  --   --   --   --   --   --   --  1.28   MAGNESIUM  --   --   --   --   --  2.0 2.0 2.3   PHOSPHORUS  --   --   --   --   --   --  2.9 2.7         Lab 12/17/23  0353 12/16/23  0304 12/15/23  1831 12/15/23  1502   TOTAL PROTEIN 5.5* 5.3*  --   --    ALBUMIN 3.5 4.2 4.4 3.9   GLOBULIN 2.0 1.1  --   --    ALT (SGPT) 11 21  --   --    AST (SGOT) 33 48*  --   --    BILIRUBIN 0.4 0.2  --   --    ALK PHOS 45 43  --   --          Lab 12/16/23  0304 12/15/23  1502   PROTIME 15.4* 15.2*   INR 1.20* 1.19*                 Lab 12/15/23  1903 12/15/23  1546 12/15/23  1310   PH, ARTERIAL 7.376 7.349* 7.34*   PCO2, ARTERIAL 40.8 44.8  --    PO2 ART 73.7* 171.0*  --    FIO2 40 21  --    HCO3 ART 23.9 24.6  --    BASE EXCESS ART -1.2* -1.1*  --    CARBOXYHEMOGLOBIN 1.4 1.1  --      Brief Urine Lab Results       None          Microbiology Results (last 10 days)       ** No results found for the last 240 hours. **            XR Chest 1 View    Result Date: 12/20/2023  XR CHEST 1 VW Date of Exam: 12/20/2023 9:07 AM EST Indication: postop Comparison: Chest x-ray 12/17/2023 Findings: Removal of right IJ CVC and thoracostomy tubes. Stable cardiomediastinal silhouette within normal limits. Right greater than left bibasilar parenchymal opacities likely reflect some atelectasis. Stable small left-sided pleural effusion. No definite pneumothorax.     Impression: Removal of right IJ CVC and thoracostomy tubes. Stable cardiomediastinal silhouette within normal limits. Right greater than left bibasilar parenchymal opacities likely reflect some atelectasis. Stable small left-sided pleural effusion. No definite pneumothorax. Electronically Signed: Quique Caal MD  12/20/2023 9:22 AM EST  Workstation ID: [id number]    XR Chest 1 View    Result Date:  12/17/2023  XR CHEST 1 VW Date of Exam: 12/17/2023 2:36 AM EST Indication: Post-Op Heart Surgery Comparison: Chest radiograph 12/16/2023 Findings: Sternotomy and thoracotomy drains. Right IJ central catheter in stable position. Cardiomediastinal silhouette similar to prior exam. Lung volumes are low with similar bibasilar airspace opacities, presumed atelectasis. No large effusion. Blunted costophrenic angles which could relate to trace effusions and would be similar to prior. No pneumothorax. No worsening edema or new infiltrate.     Impression: No significant interval change. Electronically Signed: Fady Araujo MD  12/17/2023 8:58 AM EST  Workstation ID: [id number]    XR Chest 1 View    Result Date: 12/16/2023  XR CHEST 1 VW Date of Exam: 12/16/2023 1:30 AM CST Indication: Post-Op Heart Surgery Comparison: Chest x-ray 12/15/2023 Findings: There has been removal of an endotracheal tube and enteric tube. Right jugular catheter is present unchanged. There are mediastinal drains and median sternotomy wires. A left pleural chest tube is noted. There is mild bibasilar atelectasis. No pneumothorax or large pleural effusion.     Impression: Mild bibasilar atelectasis. Electronically Signed: Marissa Mast MD  12/16/2023 7:34 AM CST  Workstation ID: [id number]    XR Chest 1 View    Result Date: 12/15/2023  XR CHEST 1 VW Date of Exam: 12/15/2023 2:52 PM EST Indication: Post-Op Check Line & Tube Placement Comparison: 12/12/2023 Findings: Lines: Endotracheal tube approximately 4.1 cm above the marshall. Enteric tube is noted entering the stomach, with the distal tip in the stomach, although the sideport of the enteric tube is noted at the level of the GE junction. Adequate position of bilateral chest tubes and mediastinal drainage catheter. Right internal jugular venous catheter with the distal tip in the mid SVC. Lungs: Poor respiratory effort accentuates the pulmonary vasculature and cardiomediastinal silhouette. No definite  consolidation. Hazy opacities in the lung bases most likely represents atelectasis. Pleura: No pleural effusion or pneumothorax. Cardiomediastinum: Postsurgical changes are noted in the cardiomediastinal silhouette. Soft Tissues: Unremarkable. Bones: No acute osseous abnormality. Patient is status post median sternotomy     Impression: Lines and tubes as characterized above. Please note that the enteric tube is noted with the distal tip in the stomach, although the sideport is at the level of the GE junction. Please consider advancing by 3 to 4 cm. Electronically Signed: Enrike Walters DO  12/15/2023 3:32 PM EST  Workstation ID: [id number]    Central Line    Result Date: 12/15/2023  Gerald Remy MD     12/15/2023 11:20 AM Central Line Patient reassessed immediately prior to procedure Patient location during procedure: OR Indications: vascular access Staff Anesthesiologist: Gerald Remy MD Preanesthetic Checklist Completed: patient identified, IV checked, site marked, risks and benefits discussed, surgical consent, monitors and equipment checked, pre-op evaluation and timeout performed Central Line Prep Sterile Tech:cap, gloves, gown, mask and sterile barriers Prep: chloraprep Patient monitoring: blood pressure monitoring, continuous pulse oximetry and EKG Central Line Procedure Laterality:right Location:internal jugular Catheter Type:MAC Catheter Size:9 Fr Guidance:ultrasound guided PROCEDURE NOTE/ULTRASOUND INTERPRETATION.  Using ultrasound guidance the potential vascular sites for insertion of the catheter were visualized to determine the patency of the vessel to be used for vascular access.  After selecting the appropriate site for insertion, the needle was visualized under ultrasound being inserted into the internal jugular vein, followed by ultrasound confirmation of wire and catheter placement. There were no abnormalities seen on ultrasound; an image was taken; and the patient tolerated the  procedure with no complications. Images: still images obtained, printed/placed on chart Assessment Post procedure:biopatch applied, line sutured, occlusive dressing applied and secured with tape Assessement:blood return through all ports, free fluid flow, chest x-ray ordered and Robert Test Complications:no Patient Tolerance:patient tolerated the procedure well with no apparent complications     Arterial Line    Result Date: 12/15/2023  Eugene Ferraro Jr., MD     12/15/2023  9:20 AM Arterial Line Patient reassessed immediately prior to procedure Patient location during procedure: pre-op Start time: 12/15/2023 9:10 AM Stop Time:12/15/2023 9:19 AM  Line placed for hemodynamic monitoring. Performed By Anesthesiologist: Eugene Ferraro Jr., MD Preanesthetic Checklist Completed: patient identified, IV checked, site marked, risks and benefits discussed, surgical consent, monitors and equipment checked, pre-op evaluation and timeout performed Arterial Line Prep  Sterile Tech: cap, gloves and sterile barriers Prep: ChloraPrep Patient monitoring: blood pressure monitoring, continuous pulse oximetry and EKG Arterial Line Procedure Laterality:right Location:  radial artery Catheter size: 20 G Guidance: ultrasound guided PROCEDURE NOTE/ULTRASOUND INTERPRETATION.  Using ultrasound guidance the potential vascular sites for insertion of the catheter were visualized to determine the patency of the vessel to be used for vascular access.  After selecting the appropriate site for insertion, the needle was visualized under ultrasound being inserted into the radial artery, followed by ultrasound confirmation of wire and catheter placement. There were no abnormalities seen on ultrasound; an image was taken; and the patient tolerated the procedure with no complications. Number of attempts: 1 Successful placement: yes Images: still images not obtained Post Assessment Dressing Type: line sutured, occlusive dressing applied, secured  with tape and wrist guard applied. Complications no Circ/Move/Sens Assessment: normal and unchanged. Patient Tolerance: patient tolerated the procedure well with no apparent complications     Spirometry    Result Date: 12/14/2023  Spirometry Interpretation: Normal Spirometry. (Normal FEV1 ratio and FVC).    Carotid Duplex - Bilateral    Result Date: 12/14/2023    Right internal carotid artery demonstrates a less than 50% stenosis.   Left internal carotid artery demonstrates a less than 50% stenosis.     Adult Transthoracic Echocardiogram Complete    Result Date: 12/13/2023    Left ventricular systolic function is normal. Calculated left ventricular EF of 64%.  There is hypokinesis of the anteroseptum, inferoseptum, and apex   Left ventricular diastolic function was normal.   Normal right ventricular size and systolic function   No significant valvular abnormality   No pericardial effusion   No LV thrombus on contrasted images   No prior echocardiogram for comparison     Cardiac Catheterization/Vascular Study    Result Date: 12/13/2023    Severe triple-vessel disease Evaluation for coronary artery bypass graft     XR Chest 1 View    Result Date: 12/12/2023  XR CHEST 1 VW Date of Exam: 12/12/2023 7:13 PM EST Indication: Chest Pain Triage Protocol Comparison: None available. Findings: Lines: None Lungs: Nonspecific minimal patchy opacities in the peripheral aspect of the lung bases bilaterally. Otherwise the lungs are clear Pleura: No pleural effusion or pneumothorax. Cardiomediastinum: The cardiomediastinal silhouette is normal Soft Tissues: Unremarkable. Bones: No acute osseous abnormality.     Impression: Minimal patchy opacities in the peripheral lung bases bilaterally most likely represents atelectasis, although pulmonary infiltrates not completely excluded. Electronically Signed: Enrike Walters DO  12/12/2023 7:52 PM EST  Workstation ID: [id number]     Results for orders placed during the hospital encounter of  12/12/23    Carotid Duplex - Bilateral    Interpretation Summary    Right internal carotid artery demonstrates a less than 50% stenosis.    Left internal carotid artery demonstrates a less than 50% stenosis.      Results for orders placed during the hospital encounter of 12/12/23    Carotid Duplex - Bilateral    Interpretation Summary    Right internal carotid artery demonstrates a less than 50% stenosis.    Left internal carotid artery demonstrates a less than 50% stenosis.      Results for orders placed during the hospital encounter of 12/12/23    Adult Transthoracic Echocardiogram Complete    Interpretation Summary    Left ventricular systolic function is normal. Calculated left ventricular EF of 64%.  There is hypokinesis of the anteroseptum, inferoseptum, and apex    Left ventricular diastolic function was normal.    Normal right ventricular size and systolic function    No significant valvular abnormality    No pericardial effusion    No LV thrombus on contrasted images    No prior echocardiogram for comparison          Discharge Details        Discharge Medications        New Medications        Instructions Start Date   atorvastatin 40 MG tablet  Commonly known as: LIPITOR  Notes to patient: For cholesterol and to help prevent plaques    40 mg, Oral, Nightly      clopidogrel 75 MG tablet  Commonly known as: PLAVIX  Notes to patient: For your heart, in combination with aspirin to help prevent blockages    75 mg, Oral, Daily      metoprolol tartrate 50 MG tablet  Commonly known as: LOPRESSOR  Notes to patient: For heart rate and blood pressure  STOP TAKING ATENOLOL.    50 mg, Oral, Every 12 Hours Scheduled      pharmacy consult - MTM   Does not apply, Daily             Changes to Medications        Instructions Start Date   tamsulosin 0.4 MG capsule 24 hr capsule  Commonly known as: FLOMAX  What changed:   how much to take  how to take this  when to take this  Notes to patient: For prostate and to help with  urination    0.4 mg, Oral, Daily             Continue These Medications        Instructions Start Date   aspirin 81 MG EC tablet  Notes to patient: For your heart    81 mg, Oral, Daily      famotidine 10 MG tablet  Commonly known as: PEPCID  Notes to patient: Heartburn, GERD    10 mg, Oral, Every Morning      fluticasone 50 MCG/ACT nasal spray  Commonly known as: FLONASE  Notes to patient: For allergies, nasal congestion relief    2 sprays into the nostril(s) as directed by provider Daily.      JUICE PLUS FIBRE PO  Notes to patient: Supplement    1 dose, Oral, Every Morning      levothyroxine 137 MCG tablet  Commonly known as: SYNTHROID, LEVOTHROID  Notes to patient: For your thyroid    137 mcg, Oral, Daily      montelukast 10 MG tablet  Commonly known as: SINGULAIR  Notes to patient: For allergies    10 mg, Oral, Nightly      nitroglycerin 0.4 MG SL tablet  Commonly known as: NITROSTAT  Notes to patient: For chest pain relief. KEEP IN ORIGINAL BRYN BOTTLE.    0.4 mg, Sublingual, Every 5 Minutes PRN, Take no more than 3 doses in 15 minutes.      NovoLIN 70/30 FlexPen (70-30) 100 UNIT/ML suspension pen-injector  Generic drug: Insulin NPH Isophane & Regular  Notes to patient: For blood sugar control    Inject 30 Units under the skin into the appropriate area as directed 2 (Two) Times a Day.      Repatha SureClick solution auto-injector SureClick injection  Generic drug: Evolocumab  Notes to patient: For cholesterol    ADMINISTER 1 INJECTION UNDER THE SKIN EVERY 2 WEEKS      trandolapril 4 MG tablet  Commonly known as: MAVIK  Notes to patient: For blood pressure    4 mg, Oral, Every Morning             Stop These Medications      atenolol 25 MG tablet  Commonly known as: TENORMIN     triamterene-hydrochlorothiazide 37.5-25 MG per capsule  Commonly known as: DYAZIDE              Allergies   Allergen Reactions    Hydrocodone-Acetaminophen GI Intolerance    Penicillins Rash         Discharge Disposition:  Home or Self  "Care    Diet:  Hospital:No active diet order      Activity:  Activity Instructions       Activity as Tolerated      Bathing Restrictions      You may shower    Type of Restriction: Bathing    Bathing Restrictions: No Tub Bath    Driving Restrictions      Type of Restriction: Driving    Driving Restrictions: No Driving While Taking Narcotics    Lifting Restrictions      Type of Restriction: Lifting    Lifting Restrictions: Lifting Restriction (Indicate Limit)    Weight Limit (Pounds): 10    Length of Lifting Restriction: until seen at next follow-up appointment            Restrictions or Other Recommendations:  No driving until seen by your cardiac surgeon at your follow up appointment. Do not drive while taking narcotics. Get up and get dressed each morning; do not stay in bed. Walking is the best form of exercise because it increases circulation throughout the body and to the heart muscle. Get plenty of sleep at night (8-10 hours). It is important for your breast bone (sternum) to heal properly after surgery.   Please follow these guidelines:  -No lifting, pulling, or pushing greater than 10 lbs for 12 weeks.   -Do not push or pull with your arms, especially when rising from a chair   -Have someone help you get up or roll to your side, and push off with your elbow to get out of bed.  -Avoid activities that cause you to strain or pull on your chest, such as driving a  or tractor, raking, vacuuming, moving heavy items, shoveling, opening tight jars or swinging a golf club.  -If you have \"clicking\" in your sternum, avoid activities that cause clicking until the sternum heals.  -Canes or walkers may be used only for balance. Do not place your full weight on any of these devices until the chest is completely healed (usually 12 weeks).       CODE STATUS:    Code Status and Medical Interventions:   Ordered at: 12/15/23 1442     Code Status (Patient has no pulse and is not breathing):    CPR (Attempt to " Resuscitate)     Medical Interventions (Patient has pulse or is breathing):    Full Support       Future Appointments   Date Time Provider Department Center   1/12/2024 11:30 AM Sammie Pinto APRN [facility]   4/8/2024  2:00 PM Magan Washington MD [facility]       Additional Instructions for the Follow-ups that You Need to Schedule       Ambulatory Referral to Cardiac Rehab   As directed      Call MD With Problems / Concerns   As directed      Instructions:   Please call the CT Surgery office with any questions or concerns you may have [phone]    Order Comments: Instructions: Please call the CT Surgery office with any questions or concerns you may have [phone]         Discharge Follow-up with PCP   As directed       Currently Documented PCP:    Little Barker MD    PCP Phone Number:    [phone]     Follow Up Details: Follow Up With PCP Within 7-14 Days        Discharge Follow-up with Specialty: Cardiology; 1 Month   As directed      Specialty: Cardiology   Follow Up: 1 Month        Discharge Follow-up with Specialty: Cardiothoracic Surgery   As directed      Follow Up Details: Follow Up in 2-4 Weeks   Specialty: Cardiothoracic Surgery        Discharge Follow-up with Specialty: [facility]; 1 Week   As directed      Specialty: [facility]   Follow Up: 1 Week   Follow Up Details: Follow Up With [facility] Within 7 Days of Discharge. If Discharged on a Weekend, Schedule Follow Up For The Following Friday at 0900.        Cardiac Rehab Evaluation and Enrollment   Dec 25, 2023      Reason for Consult: Education                Discharge Education:  Post-Op Education:  I reviewed the patient's sternal precautions and outlined the physical activity suitable for each benchmark at the 6-week 3-month and 1 year intervals.  Wound Care:  Patient educated regarding care of post-operative wounds.  Patient is to wash with plain soap and water and pat dry.  Patient is  not to use salves on the incision sites.  Patient instructed regarding the signs and symptoms of infection and when to call the office with any concerns.    Special Instructions:   1.  Keep incisions clean and dry at all times. Take a shower daily. Do not take a tub bath or use hot tubs until seen by the cardiac surgeon in your follow-up visit. Clean  your body and incisions daily with Dial soap and water. Always use a clean washcloth. Do not scrub your incision(s). Do not re-use dressings on your incision(s). Do not use any lotions, creams, oils, powders, antibiotic ointment (i.e., Neosporin), peroxide, alcohol, or iodine UNLESS told to do by the cardiac surgeon.  Patient may shower, but no tub baths until CT Surgery followup.   2.  No lifting over 10 lbs until CT Surgery follow up.  3.  No driving until released to do so by the surgeon.      Surgical Leg Precautions:   -Avoid sitting in one position or standing for long periods of time.  -Do not cross your legs.  -Keep your legs elevated while sitting  -Do not use any lotions, creams, oils, powders, antibiotic ointment (i.e. Neosporin), peroxide, alcohol, or iodine unless specifically prescribed by the cardiac surgeon.  -If elastic stockings (SHREYAS hose) were prescribed to you, wear the stockings while you are up for at least two weeks after discharge. The stockings help decrease swelling. However, remove stockings at bedtime. Wash the stockings with mild soap and water, and make sure they are completely dry before you put them back on.    When to Call the Cardiac Surgeon:  -Increased swelling, redness, tenderness, and drainage  -Increased warmth in the skin around an incision  -Large amount of clear or pinkish drainage  -Sudden increased amount of drainage  -White, yellow, or greenish drainage  -Odor, which may be foul or sweet, coming from an incision  -An opening in your incisions  -Temperature higher than 101 degrees Fahrenheit   -Temperature higher than 100  degrees Fahrenheit two times within 24 hours  -Do not hesitate to call the cardiac surgery nurse navigator or cardiac surgeon if you have concerns or questions.     *The [facility] cardiac surgery nurse navigator is available Monday-Friday 8 a.m. to 4:30 p.m. [phone]  Call 911:  -Chest pain (pain similar to what you experienced prior to surgery)  -Fainting spells  -Bright red stool  -Vomiting bright red blood  -Shortness of breath not relieved by rest  -Sudden numbness or weakness in arms or legs  -Sudden, severe headache  -Heart rate faster than 150 beats/minute with shortness of breath or a new irregular heart rate    Rachel Lambert, APRN  12/21/23  09:59 EST    Time: I spent 35 minutes on this discharge activity which included: face-to-face encounter with the patient, reviewing the data in the system, coordination of the care with the nursing staff as well as consultants, documentation, and entering orders.

## 2023-12-27 ENCOUNTER — READMISSION MANAGEMENT (OUTPATIENT)
Dept: CALL CENTER | Facility: HOSPITAL | Age: 78
End: 2023-12-27
Payer: MEDICARE

## 2023-12-27 ENCOUNTER — TELEPHONE (OUTPATIENT)
Dept: CARDIAC SURGERY | Facility: CLINIC | Age: 78
End: 2023-12-27
Payer: MEDICARE

## 2023-12-27 NOTE — TELEPHONE ENCOUNTER
Mrs. Atkinson called to report that patient was seen by PCP, Dr. Barker today for lower extremity swelling. He was prescribed lasix and potassium as well as colchicine for gout in foot. He has been fatigue since discharge. PCP has ordered labs and is to contact patient with results.

## 2023-12-27 NOTE — OUTREACH NOTE
"AMI Week 1 Survey      Flowsheet Row Responses   [facility] patient discharged from? [place]   Does the patient have one of the following disease processes/diagnoses(primary or secondary)? Acute MI (STEMI,NSTEMI)   Week 1 attempt successful? Yes   Call start time 1317   Call end time 1337   Discharge diagnosis NSTEMI   Is patient permission given to speak with other caregiver? Yes   List who call center can speak with wife   Person spoke with today (if not patient) and relationship wife   Medication alerts for this patient PCP added the diuretic Lasix 20mg daily, KCL and Colchicine today, per wife's report   Meds reviewed with patient/caregiver? Yes   Is the patient having any side effects they believe may be caused by any medication additions or changes? No   Does the patient have all prescriptions related to this admission filled (includes statins,anticoagulants,HTN meds,anti-arrhythmia meds) Yes   Is the patient taking all medications as directed (includes completed medication regime)? Yes   Does the patient have a primary care provider?  Yes   Does the patient have an appointment with their PCP,cardiologist,or clinic within 7 days of discharge? Yes   Has the patient kept scheduled appointments due by today? Yes   Comments Per wife the pt is having bilat LE edema and gout in L.LE.Pt wearing compression stockings at home,wife reports. Wife reports that the pt has \"been smothering since discharge\", nurse gave wife CT surg #and advised to call today to alert them of edema& SOA, wife verbalized understanding. Nurse educated wife on monitoring first morning wts, prior to po intake, wife verbalized understanding. Wife monitoring VS at home, /63, HR 75, gluc 233. Right radial LHC site, midline chest incision & RLE incision are w/o s/sx of infection per wife.Pt has decreased appetite but tolerating well, no issues voiding wife reports.   Did the patient receive a copy of their discharge instructions? Yes "   Nursing interventions Reviewed instructions with patient   What is the patient's perception of their health status since discharge? Worsening   Nursing interventions Nurse provided patient education, Advised patient to call provider   Is the patient/caregiver able to teach back signs and symptoms of when to call for help immediately: Sudden chest discomfort, Sudden discomfort in arms, back, neck or jaw, Shortness of breath at any time, Irregular or rapid heart rate   Nursing interventions Nurse provided patient education   Is the patient/caregiver able to teach back lifestyle changes to help prevent MIs Quit smoking   Is the patient/caregiver able to teach back ways to prevent a second heart attack: Take medications, Follow up with MD   If the patient is a current smoker, are they able to teach back resources for cessation? --  [pt has not used tobacco since DC, wife reports]   Is the patient/caregiver able to teach back the hierarchy of who to call/visit for symptoms/problems? PCP, Specialist, Home health nurse, Urgent Care, ED, 911 Yes   Week 1 call completed? Yes   Is the patient interested in additional calls from an ambulatory ? No   Would this patient benefit from a Referral to [facility] Social Work? No   Call end time [id number]            Kelsie KONG - Registered Nurse

## 2024-01-05 ENCOUNTER — READMISSION MANAGEMENT (OUTPATIENT)
Dept: CALL CENTER | Facility: HOSPITAL | Age: 79
End: 2024-01-05
Payer: MEDICARE

## 2024-01-05 NOTE — OUTREACH NOTE
AMI Week 2 Survey      Flowsheet Row Responses   [facility] discharged [facility]? [place]   Does the patient have one of the following disease processes/diagnoses(primary or secondary)? Acute MI (STEMI,NSTEMI)   Week 2 attempt successful? No   Unsuccessful attempts Attempt 1            Amelia MG - Registered Nurse

## 2024-01-08 ENCOUNTER — READMISSION MANAGEMENT (OUTPATIENT)
Dept: CALL CENTER | Facility: HOSPITAL | Age: 79
End: 2024-01-08
Payer: MEDICARE

## 2024-01-08 NOTE — OUTREACH NOTE
AMI Week 2 Survey      Flowsheet Row Responses   [facility] patient discharged from? [place]   Does the patient have one of the following disease processes/diagnoses(primary or secondary)? Acute MI (STEMI,NSTEMI)   Week 2 attempt successful? Yes   Call start time 1807   Call end time 1817   Discharge diagnosis NSTEMI   Meds reviewed with patient/caregiver? Yes   Is the patient having any side effects they believe may be caused by any medication additions or changes? Yes   Side effects comments  He is unsure if new meds are causing issues he is having with elevated hr and not being able to sleep.   Is the patient taking all medications as directed (includes completed medication regime)? Yes   Does the patient have a primary care provider?  Yes   Does the patient have an appointment with their PCP,cardiologist,or clinic within 7 days of discharge? Yes   Has the patient kept scheduled appointments due by today? N/A   Comments Has appt Wed with PCP and states had f/u with surgeon on Friday   Psychosocial issues? No   What is the patient's perception of their health status since discharge? New symptoms unrelated to diagnosis   Is the patient/caregiver able to teach back signs and symptoms of when to call for help immediately: Sudden chest discomfort, Sudden discomfort in arms, back, neck or jaw, Shortness of breath at any time, Sudden sweating or clammy skin, Nausea or vomiting, Dizziness or lightheadedness, Irregular or rapid heart rate   Is the patient/caregiver able to teach back ways to prevent a second heart attack: Take medications, Follow up with MD   Is the patient/caregiver able to teach back the hierarchy of who to call/visit for symptoms/problems? PCP, Specialist, Home health nurse, Urgent Care, ED, 911 Yes   Additional teach back comments States he hasn't been feeling well.  Only sleeping a couple of hours a night.  BP has been staying elevated and hr rate is up and down.  Blood sugars have been  higher than normal.  He has been keeping a log and will take to appt on Wed with PCP.  Has appt Friday with surgeon.  If symptoms worsen he will go to ED.  Denies any chest pains or soa.   Week 2 call completed? Yes   Wrap up additional comments Pt writing all symptoms down to discuss with PCP and surgeon.  If symptoms worsen or he has other symptoms, he will go to ED.   Call end time 1817            Claudia HORNE - Licensed Nurse

## 2024-01-09 NOTE — PROGRESS NOTES
"     [facility] Cardiothoracic Surgery Office Follow Up Note     Date of Encounter: 2024     Name: Joni Hoffman  : 1945     Referred By: No ref. provider found  PCP: Little Barker MD    Chief Complaint:    Chief Complaint   Patient presents with    Coronary Artery Disease     Hospital follow up s/p CABG x3 on 12/15/23        Subjective      History of Present Illness:    Joni Hoffman is a very pleasant 78 y.o. 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 Darnell.  Patient had uneventful postoperative course and was discharged on POD #5 with no readmissions.  He presents today for his initial postoperative eval. He has no uncontrolled pain, respiratory complaints, or incisional healing concerns. He did see his PCP for sinus congestion and a cough with eating had CXR performed with \"a touch of pneumonia\" and was prescribed doxy. He has no symptoms today. His primary complaint today is his inability to sleep at night secondary to Lipitor. They also have questions about his heart rate being in the 90s-low 100s.   He is anticipating initiation of cardiac rehab and has follow-up with his cardiologist dr Agosto.    Review of Systems:  Review of Systems   Constitutional: Negative for chills, decreased appetite, diaphoresis, fever, malaise/fatigue, night sweats, weight gain and weight loss.   HENT:  Negative for hoarse voice.    Eyes:  Negative for blurred vision, double vision and visual disturbance.   Cardiovascular:  Negative for chest pain, claudication, dyspnea on exertion, irregular heartbeat, leg swelling, near-syncope, orthopnea, palpitations, paroxysmal nocturnal dyspnea and syncope.   Respiratory:  Positive for cough. Negative for hemoptysis, shortness of breath, sputum production and " wheezing.    Hematologic/Lymphatic: Negative for adenopathy and bleeding problem. Does not bruise/bleed easily.   Skin:  Negative for color change, nail changes, poor wound healing and rash.   Musculoskeletal:  Negative for back pain, falls and muscle cramps.   Gastrointestinal:  Negative for abdominal pain, dysphagia and heartburn.   Genitourinary:  Negative for flank pain.   Neurological:  Negative for brief paralysis, disturbances in coordination, dizziness, focal weakness, headaches, light-headedness, loss of balance, numbness, paresthesias, sensory change, vertigo and weakness.   Psychiatric/Behavioral:  Negative for depression and suicidal ideas. The patient has insomnia (due to taking lipitor).    Allergic/Immunologic: Negative for persistent infections.       I have reviewed the following portions of the patient's history: problem list, current medications, allergies, past surgical history, past medical history, past social history, past family history, and ROS and confirm it's accurate.    Allergies:  Allergies   Allergen Reactions    Hydrocodone-Acetaminophen GI Intolerance    Penicillins Rash       Medications:      Current Outpatient Medications:     aspirin 81 MG EC tablet, Take 1 tablet by mouth Daily., Disp: , Rfl:     atorvastatin (LIPITOR) 40 MG tablet, Take 1 tablet by mouth Every Night., Disp: 90 tablet, Rfl: 3    clopidogrel (PLAVIX) 75 MG tablet, Take 1 tablet by mouth Daily., Disp: 30 tablet, Rfl: 5    colchicine 0.6 MG tablet, Take 1 tablet by mouth Daily., Disp: , Rfl:     doxycycline (VIBRAMYICN) 100 MG tablet, Take 1 tablet by mouth 2 (Two) Times a Day., Disp: , Rfl:     famotidine (PEPCID) 10 MG tablet, Take 1 tablet by mouth Every Morning., Disp: , Rfl:     ferrous gluconate (FERGON) 324 MG tablet, Take 1 tablet by mouth Daily With Breakfast., Disp: , Rfl:     Finerenone (Kerendia) 10 MG tablet, Take  by mouth., Disp: , Rfl:     fluticasone (FLONASE) 50 MCG/ACT nasal spray, 2 sprays into  the nostril(s) as directed by provider Daily., Disp: , Rfl:     furosemide (LASIX) 40 MG tablet, Take 1 tablet by mouth 2 (Two) Times a Day., Disp: , Rfl:     levothyroxine (SYNTHROID, LEVOTHROID) 137 MCG tablet, Take 1 tablet by mouth Daily., Disp: , Rfl:     metoprolol tartrate (LOPRESSOR) 50 MG tablet, Take 1 tablet by mouth Every 12 (Twelve) Hours., Disp: 60 tablet, Rfl: 6    montelukast (SINGULAIR) 10 MG tablet, Take 1 tablet by mouth Every Night., Disp: , Rfl:     nitroglycerin (NITROSTAT) 0.4 MG SL tablet, Place 1 tablet under the tongue Every 5 (Five) Minutes As Needed for Chest Pain. Take no more than 3 doses in 15 minutes., Disp: , Rfl:     NovoLIN 70/30 FlexPen (70-30) 100 UNIT/ML suspension pen-injector, Inject 30 Units under the skin into the appropriate area as directed 2 (Two) Times a Day., Disp: , Rfl:     Nutritional Supplements (JUICE PLUS FIBRE PO), Take 1 dose by mouth Every Morning., Disp: , Rfl:     pharmacy consult - MTM, Use Daily., Disp: , Rfl:     potassium chloride (MICRO-K) 10 MEQ CR capsule, Take 1 capsule by mouth 2 (Two) Times a Day., Disp: , Rfl:     tamsulosin (FLOMAX) 0.4 MG capsule 24 hr capsule, Take 1 capsule by mouth Daily., Disp: 30 capsule, Rfl: 2    trandolapril (MAVIK) 4 MG tablet, Take 1 tablet by mouth Every Morning., Disp: , Rfl: 3    Repatha SureClick solution auto-injector SureClick injection, ADMINISTER 1 INJECTION UNDER THE SKIN EVERY 2 WEEKS (Patient not taking: Reported on 1/12/2024), Disp: , Rfl:     History:   Past Medical History:   Diagnosis Date    Acid indigestion     related to meds    Arthritis     Coronary artery disease 2000    s/p 2 stents after MI     Diabetes mellitus 2007    po meds; checks blood sugars at home run     Disease of thyroid gland     hypothyroidism     Hearing loss     uses hearing aids     History of kidney stones     passed    Hypertension     Lumbar back pain     MI, old 2000    2 stents inserted    Wears glasses     Wears  hearing aid        Past Surgical History:   Procedure Laterality Date    APPENDECTOMY      CARDIAC CATHETERIZATION N/A 2023    Procedure: Left Heart Cath;  Surgeon: Elza Mena MD;  Location:  [facility];  Service: Cardiology;  Laterality: N/A;    CATARACT EXTRACTION WITH INTRAOCULAR LENS IMPLANT Bilateral     COLONOSCOPY      CORONARY ARTERY BYPASS GRAFT N/A 12/15/2023    Procedure: MEDIAN STERNOTOMY, CORONARY ARTERY BYPASS X3 WITH INTERNAL MAMMARY ARTERY GRAFT, ENDOVASCULAR VEIN HARVEST OF THE RIGHT GREATER SAPHENOUS VEIN;  Surgeon: Adam Darnell MD;  Location:  [facility];  Service: Cardiothoracic;  Laterality: N/A;    CORONARY STENT PLACEMENT  2000    x2    INTERVENTIONAL RADIOLOGY PROCEDURE N/A 2019    Procedure: myelogram lumbar spine;  Surgeon: Karan Pennington MD;  Location:  [facility];  Service: Interventional Radiology    LUMBAR DISCECTOMY Left 10/27/2017    Procedure: LUMBAR DISCECTOMY L5-S1 LEFT ;  Surgeon: Elbert De Anda MD;  Location:  [facility];  Service:     LUMBAR DISCECTOMY Left 2020    Procedure: RE-DO LUMBAR DISCECTOMY L5-S1 LEFT;  Surgeon: Elbert De Anda MD;  Location:  [facility];  Service: Neurosurgery;  Laterality: Left;    NASAL POLYP SURGERY         Social History     Socioeconomic History    Marital status:    Tobacco Use    Smoking status: Former     Packs/day: 1.00     Years: 25.00     Additional pack years: 0.00     Total pack years: 25.00     Types: Cigarettes     Quit date:      Years since quittin.0    Smokeless tobacco: Current     Types: Chew    Tobacco comments:     hx of cigarette use for 25 years and chewed about 25 years (currently chews on occasion)    Vaping Use    Vaping Use: Never used   Substance and Sexual Activity    Alcohol use: No    Drug use: No    Sexual activity: Defer        Family History   Problem Relation Age of Onset    Heart disease Mother     Heart disease Father     Diabetes Sister   "   Heart disease Brother        Objective     Physical Exam:  Vitals:    24 1138   BP: 138/72   BP Location: Right arm   Patient Position: Sitting   Pulse: 98   Temp: 98 °F (36.7 °C)   SpO2: 98%   Weight: 96.1 kg (211 lb 12.8 oz)   Height: 167.6 cm (66\")      Body mass index is 34.19 kg/m².    Physical Exam  Vitals and nursing note reviewed.   Constitutional:       Appearance: Normal appearance. He is obese.      Interventions: He is not intubated.     Comments: cane   Neck:      Vascular: No carotid bruit.   Cardiovascular:      Rate and Rhythm: Normal rate and regular rhythm.      Pulses: Normal pulses.           Radial pulses are 2+ on the right side.      Heart sounds: Normal heart sounds, S1 normal and S2 normal. No murmur heard.  Pulmonary:      Effort: Pulmonary effort is normal. No tachypnea, bradypnea, accessory muscle usage, prolonged expiration, respiratory distress or retractions. He is not intubated.      Breath sounds: Normal breath sounds and air entry.   Abdominal:      Palpations: Abdomen is soft.   Musculoskeletal:         General: Normal range of motion.      Cervical back: Normal range of motion and neck supple.      Right lower le+ No edema.      Left lower le+ No edema.   Skin:     General: Skin is warm and dry.      Capillary Refill: Capillary refill takes less than 2 seconds.      Comments: Mid-sternotomy incision: wound edges well approximated, no erythema, edema, or induration  Mediastinal tubes sites: well healing without erythema, edema, or drainage  Endoscopic vein harvest site: well healing without erythema, edema, or drainage   Neurological:      General: No focal deficit present.      Mental Status: He is alert and oriented to person, place, and time. Mental status is at baseline.      GCS: GCS eye subscore is 4. GCS verbal subscore is 5. GCS motor subscore is 6.      Motor: Motor function is intact.      Coordination: Coordination is intact.      Gait: Gait is intact. "   Psychiatric:         Mood and Affect: Mood normal.         Speech: Speech normal.         Behavior: Behavior normal. Behavior is cooperative.         Cognition and Memory: Cognition normal.         Imaging/Labs:  XR outside films (01/12/2024 11:30)     Carotid Duplex - Bilateral (12/13/2023 18:28)     Right internal carotid artery demonstrates a less than 50% stenosis.    Left internal carotid artery demonstrates a less than 50% stenosis.    Assessment / Plan      Assessment / Plan:  Diagnoses and all orders for this visit:    1. 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 Darnell.   uneventful postoperative course and was discharged on POD #5 with no readmissions.   initial postoperative eval no uncontrolled pain, respiratory complaints, or incisional healing concerns  On exam he has beautifully healed sternotomy site without overt complication.   CXR with PCP without PTX or effusion. Bilateral dependent atelectasis improved from 12/20 comparison. Sternal wires intact. Granuloma similar to preop  Pt can continue antibiotic per PCP instruction. I have encouraged increased IS and ambulation   Follow-up with cards for questions/concerns regarding medication as well as heart rate     Patient Education:  Activity Restrictions: You may resume driving at 6 weeks post-operatively. No lifting more than 10 lbs for 12 weeks (3 months). At this time you can slowly increase your weight as tolerated. You should not partake in any overhead activities or movements that involve twisting of your upper chest and torso such as (but not limited to) golfing or tennis, lawn mowing including zero turn mowers, use of lawn trimmers or edgers, shoveling, painting, vacuuming, mopping, sweeping, etc.     Wound Care: continue to keep your incisions clean and dry. You may use plain  antibacterial soap (i.e. dial) and water to clean and pat dry. No lotions, creams, oils, powders, salves, or ointments. Please watch for signs and symptoms of infection which can include but are not limited to: increased pain or tenderness around your incision, warmth to the site or fever, redness or red streaking, and foul smelling or appearing drainage. Clear drainage and bloody drainage are not worrisome. If your wound opens up please contact our office.      Follow Up:   Return for PRN: on as needed basis.   Or sooner for any further concerns or worsening sign and symptoms. If unable to reach us in the office please dial 911 or go to the nearest emergency department.      PILI Alaniz  [facility] Cardiothoracic Surgery

## 2024-01-12 ENCOUNTER — OFFICE VISIT (OUTPATIENT)
Dept: CARDIAC SURGERY | Facility: CLINIC | Age: 79
End: 2024-01-12
Payer: MEDICARE

## 2024-01-12 VITALS
HEIGHT: 66 IN | HEART RATE: 98 BPM | DIASTOLIC BLOOD PRESSURE: 72 MMHG | WEIGHT: 211.8 LBS | BODY MASS INDEX: 34.04 KG/M2 | SYSTOLIC BLOOD PRESSURE: 138 MMHG | OXYGEN SATURATION: 98 % | TEMPERATURE: 98 F

## 2024-01-12 DIAGNOSIS — Z95.1 S/P CABG (CORONARY ARTERY BYPASS GRAFT): Primary | ICD-10-CM

## 2024-01-12 DIAGNOSIS — Z00.6 EXAMINATION FOR NORMAL COMPARISON FOR CLINICAL RESEARCH: Primary | ICD-10-CM

## 2024-01-12 PROCEDURE — 99024 POSTOP FOLLOW-UP VISIT: CPT | Performed by: NURSE PRACTITIONER

## 2024-01-12 RX ORDER — FINERENONE 10 MG/1
TABLET, FILM COATED ORAL
COMMUNITY

## 2024-01-12 RX ORDER — FERROUS GLUCONATE 324(38)MG
324 TABLET ORAL
COMMUNITY

## 2024-01-12 RX ORDER — COLCHICINE 0.6 MG/1
0.6 TABLET ORAL DAILY
COMMUNITY

## 2024-01-12 RX ORDER — FUROSEMIDE 40 MG/1
40 TABLET ORAL 2 TIMES DAILY
COMMUNITY

## 2024-01-12 RX ORDER — DOXYCYCLINE HYCLATE 100 MG
100 TABLET ORAL 2 TIMES DAILY
COMMUNITY

## 2024-01-12 RX ORDER — POTASSIUM CHLORIDE 750 MG/1
10 CAPSULE, EXTENDED RELEASE ORAL 2 TIMES DAILY
COMMUNITY

## 2024-01-18 ENCOUNTER — READMISSION MANAGEMENT (OUTPATIENT)
Dept: CALL CENTER | Facility: HOSPITAL | Age: 79
End: 2024-01-18
Payer: MEDICARE

## 2024-01-18 NOTE — OUTREACH NOTE
AMI Week 3 Survey      Flowsheet Row Responses   [facility] facility patient discharged from? [place]   Does the patient have one of the following disease processes/diagnoses(primary or secondary)? Acute MI (STEMI,NSTEMI)   Week 3 attempt successful? No   Unsuccessful attempts Attempt 1            Lana HORTON - Registered Nurse

## 2024-01-19 ENCOUNTER — READMISSION MANAGEMENT (OUTPATIENT)
Dept: CALL CENTER | Facility: HOSPITAL | Age: 79
End: 2024-01-19
Payer: MEDICARE

## 2024-01-19 NOTE — OUTREACH NOTE
AMI Week 3 Survey      Flowsheet Row Responses   [facility] patient discharged from? [place]   Does the patient have one of the following disease processes/diagnoses(primary or secondary)? Acute MI (STEMI,NSTEMI)   Week 3 attempt successful? Yes   Call start time 1515   Call end time 1520   Discharge diagnosis NSTEMI   Person spoke with today (if not patient) and relationship Emily, wife   Meds reviewed with patient/caregiver? Yes   Is the patient having any side effects they believe may be caused by any medication additions or changes? No   Does the patient have all prescriptions related to this admission filled (includes statins,anticoagulants,HTN meds,anti-arrhythmia meds) Yes   Is the patient taking all medications as directed (includes completed medication regime)? Yes   Does the patient have a primary care provider?  Yes   Does the patient have an appointment with their PCP,cardiologist,or clinic within 7 days of discharge? Yes   Has the patient kept scheduled appointments due by today? Yes   Has home health visited the patient within 72 hours of discharge? N/A   Psychosocial issues? No   Did the patient receive a copy of their discharge instructions? Yes   Nursing interventions Reviewed instructions with patient   What is the patient's perception of their health status since discharge? Improving   Nursing interventions Nurse provided patient education   Is the patient/caregiver able to teach back signs and symptoms of when to call for help immediately: Sudden discomfort in arms, back, neck or jaw, Sudden chest discomfort, Shortness of breath at any time, Sudden sweating or clammy skin, Nausea or vomiting, Dizziness or lightheadedness, Irregular or rapid heart rate   Nursing interventions Nurse provided patient education   Is the pateint /caregiver able to teach back the importance of cardiac rehab? Yes   Nursing interventions Provided education on importance of cardiac rehab   Is the  patient/caregiver able to teach back lifestyle changes to help prevent MIs Regular exercise as approved by provider, Heart healthy diet, Maintaining a healthy weight, Reducing stress   Is the patient/caregiver able to teach back ways to prevent a second heart attack: Take medications, Follow up with MD, Participate in Cardiac Rehab, Manage risk factors   If the patient is a current smoker, are they able to teach back resources for cessation? Not a smoker   Is the patient/caregiver able to teach back the hierarchy of who to call/visit for symptoms/problems? PCP, Specialist, Home health nurse, Urgent Care, ED, 911 Yes   Week 3 call completed? Yes   Graduated Yes   Is the patient interested in additional calls from an ambulatory ? No   Would this patient benefit from a Referral to [facility] Social Work? No   Call end time 1520            Nevaeh HORTON - Registered Nurse

## 2024-04-08 ENCOUNTER — OFFICE VISIT (OUTPATIENT)
Dept: ORTHOPEDIC SURGERY | Facility: CLINIC | Age: 79
End: 2024-04-08
Payer: MEDICARE

## 2024-04-08 VITALS
DIASTOLIC BLOOD PRESSURE: 82 MMHG | WEIGHT: 207 LBS | SYSTOLIC BLOOD PRESSURE: 140 MMHG | BODY MASS INDEX: 33.27 KG/M2 | HEIGHT: 66 IN

## 2024-04-08 DIAGNOSIS — M17.12 PRIMARY OSTEOARTHRITIS OF LEFT KNEE: Primary | ICD-10-CM

## 2024-04-08 PROCEDURE — 1159F MED LIST DOCD IN RCRD: CPT | Performed by: ORTHOPAEDIC SURGERY

## 2024-04-08 PROCEDURE — 3079F DIAST BP 80-89 MM HG: CPT | Performed by: ORTHOPAEDIC SURGERY

## 2024-04-08 PROCEDURE — 99212 OFFICE O/P EST SF 10 MIN: CPT | Performed by: ORTHOPAEDIC SURGERY

## 2024-04-08 PROCEDURE — 1160F RVW MEDS BY RX/DR IN RCRD: CPT | Performed by: ORTHOPAEDIC SURGERY

## 2024-04-08 PROCEDURE — 3077F SYST BP >= 140 MM HG: CPT | Performed by: ORTHOPAEDIC SURGERY

## 2024-04-08 RX ORDER — LEVOTHYROXINE SODIUM 0.15 MG/1
TABLET ORAL
COMMUNITY
Start: 2024-03-21

## 2024-04-08 RX ORDER — METOPROLOL SUCCINATE 100 MG/1
TABLET, EXTENDED RELEASE ORAL
COMMUNITY
Start: 2024-02-14

## 2024-04-08 RX ORDER — POTASSIUM CHLORIDE 750 MG/1
TABLET, EXTENDED RELEASE ORAL
COMMUNITY
Start: 2024-03-11

## 2024-04-08 NOTE — PROGRESS NOTES
[facility] [place] Orthopaedic Surgery Clinic Note    Subjective     Chief Complaint   Patient presents with    Follow-up     4 month follow up -- primary osteoarthritis of left knee         HPI    It has been 4  month(s) since Mr. Hoffman's last visit. He returns to clinic today for follow-up of left knee arthritis. The issue has been ongoing for 1 year(s). He rates his pain a 3/10 on the pain scale. Previous/current treatments: bracing, weight loss, and viscosupplementation (last injection 12/6/23). Current symptoms: pain and giving way/buckling. The pain is worse with walking; resting improve the pain. Overall, he is doing better.  Improvement with the viscosupplementation injection series.    I have reviewed the following portions of the patient's history and agree with: History of Present Illness and Review of Systems    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    Recurrent herniation of lumbar disc    Bulging lumbar disc    Actinic keratosis    Chronic heart disease    Encounter for long-term (current) use of insulin    HTN (hypertension)    Senile hyperkeratosis    NSTEMI (non-ST elevated myocardial infarction)    CAD (coronary artery disease)    Hypothyroidism (acquired)    T2DM (type 2 diabetes mellitus)    Nocturnal hypoxia    HLD (hyperlipidemia)     Past Medical History:   Diagnosis Date    Acid indigestion     related to meds    Arthritis     Coronary artery disease 2000    s/p 2 stents after MI     Diabetes mellitus 2007    po meds; checks blood sugars at home run     Disease of thyroid gland     hypothyroidism     Hearing loss     uses hearing aids     History of kidney stones     passed    Hypertension     Lumbar back pain     MI, old 2000    2 stents inserted    Wears glasses     Wears hearing aid       Past Surgical History:   Procedure Laterality Date    APPENDECTOMY      CARDIAC CATHETERIZATION N/A 12/13/2023    Procedure: Left Heart Cath;   Surgeon: Elza Mena MD;  Location:  [facility];  Service: Cardiology;  Laterality: N/A;    CATARACT EXTRACTION WITH INTRAOCULAR LENS IMPLANT Bilateral     COLONOSCOPY      CORONARY ARTERY BYPASS GRAFT N/A 12/15/2023    Procedure: MEDIAN STERNOTOMY, CORONARY ARTERY BYPASS X3 WITH INTERNAL MAMMARY ARTERY GRAFT, ENDOVASCULAR VEIN HARVEST OF THE RIGHT GREATER SAPHENOUS VEIN;  Surgeon: Adam Darnell MD;  Location:  [facility];  Service: Cardiothoracic;  Laterality: N/A;    CORONARY STENT PLACEMENT  2000    x2    INTERVENTIONAL RADIOLOGY PROCEDURE N/A 2019    Procedure: myelogram lumbar spine;  Surgeon: Karan Pennington MD;  Location:  [facility];  Service: Interventional Radiology    LUMBAR DISCECTOMY Left 10/27/2017    Procedure: LUMBAR DISCECTOMY L5-S1 LEFT ;  Surgeon: Elbert De Anda MD;  Location:  [facility];  Service:     LUMBAR DISCECTOMY Left 2020    Procedure: RE-DO LUMBAR DISCECTOMY L5-S1 LEFT;  Surgeon: Elbert De Anda MD;  Location:  [facility];  Service: Neurosurgery;  Laterality: Left;    NASAL POLYP SURGERY        Family History   Problem Relation Age of Onset    Heart disease Mother     Heart disease Father     Diabetes Sister     Heart disease Brother      Social History     Socioeconomic History    Marital status:    Tobacco Use    Smoking status: Former     Current packs/day: 0.00     Average packs/day: 1 pack/day for 25.0 years (25.0 ttl pk-yrs)     Types: Cigarettes     Start date:      Quit date:      Years since quittin.2    Smokeless tobacco: Current     Types: Chew    Tobacco comments:     hx of cigarette use for 25 years and chewed about 25 years (currently chews on occasion)    Vaping Use    Vaping status: Never Used   Substance and Sexual Activity    Alcohol use: No    Drug use: No    Sexual activity: Defer      Current Outpatient Medications on File Prior to Visit   Medication Sig Dispense Refill    aspirin 81 MG EC tablet  Take 1 tablet by mouth Daily.      atorvastatin (LIPITOR) 40 MG tablet Take 1 tablet by mouth Every Night. 90 tablet 3    clopidogrel (PLAVIX) 75 MG tablet Take 1 tablet by mouth Daily. 30 tablet 5    colchicine 0.6 MG tablet Take 1 tablet by mouth Daily.      famotidine (PEPCID) 10 MG tablet Take 1 tablet by mouth Every Morning.      ferrous gluconate (FERGON) 324 MG tablet Take 1 tablet by mouth Daily With Breakfast.      Finerenone (Kerendia) 10 MG tablet Take  by mouth.      fluticasone (FLONASE) 50 MCG/ACT nasal spray 2 sprays into the nostril(s) as directed by provider Daily.      furosemide (LASIX) 40 MG tablet Take 1 tablet by mouth 2 (Two) Times a Day.      levothyroxine (SYNTHROID, LEVOTHROID) 150 MCG tablet       metoprolol succinate XL (TOPROL-XL) 100 MG 24 hr tablet       montelukast (SINGULAIR) 10 MG tablet Take 1 tablet by mouth Every Night.      NovoLIN 70/30 FlexPen (70-30) 100 UNIT/ML suspension pen-injector Inject 30 Units under the skin into the appropriate area as directed 2 (Two) Times a Day.      Nutritional Supplements (JUICE PLUS FIBRE PO) Take 1 dose by mouth Every Morning.      pharmacy consult - MTM Use Daily.      potassium chloride (KLOR-CON M10) 10 MEQ CR tablet       Repatha SureClick solution auto-injector SureClick injection       tamsulosin (FLOMAX) 0.4 MG capsule 24 hr capsule Take 1 capsule by mouth Daily. 30 capsule 2    trandolapril (MAVIK) 4 MG tablet Take 1 tablet by mouth Every Morning.  3    nitroglycerin (NITROSTAT) 0.4 MG SL tablet Place 1 tablet under the tongue Every 5 (Five) Minutes As Needed for Chest Pain. Take no more than 3 doses in 15 minutes. (Patient not taking: Reported on 4/8/2024)      [DISCONTINUED] doxycycline (VIBRAMYICN) 100 MG tablet Take 1 tablet by mouth 2 (Two) Times a Day.      [DISCONTINUED] levothyroxine (SYNTHROID, LEVOTHROID) 137 MCG tablet Take 1 tablet by mouth Daily.      [DISCONTINUED] metoprolol tartrate (LOPRESSOR) 50 MG tablet Take 1  "tablet by mouth Every 12 (Twelve) Hours. 60 tablet 6    [DISCONTINUED] potassium chloride (MICRO-K) 10 MEQ CR capsule Take 1 capsule by mouth 2 (Two) Times a Day.       No current facility-administered medications on file prior to visit.      Allergies   Allergen Reactions    Hydrocodone-Acetaminophen GI Intolerance    Penicillins Rash        Review of Systems   Constitutional: Negative.    HENT: Negative.     Eyes: Negative.    Respiratory: Negative.     Cardiovascular: Negative.    Gastrointestinal: Negative.    Endocrine: Negative.    Genitourinary: Negative.    Musculoskeletal:  Positive for arthralgias.   Skin: Negative.    Allergic/Immunologic: Negative.    Neurological: Negative.    Hematological: Negative.    Psychiatric/Behavioral: Negative.          Objective      Physical Exam  /82   Ht 167.6 cm (66\")   Wt 93.9 kg (207 lb)   BMI 33.41 kg/m²     Body mass index is 33.41 kg/m².    General:   Mental Status:  Alert   Appearance: Cooperative, in no acute distress   Build and Nutrition: Well-nourished well-developed male   Orientation: Alert and oriented to person, place and time   Posture: Normal   Gait: Nonantalgic    Integument:              Left knee: no skin lesions, no rash, no ecchymosis     Lower Extremities:              Left Knee:                          Tenderness:    None                          Effusion:          Trace                          Swelling:          None                          Crepitus:          Positive                          Atrophy:           None                          Range of motion:        Extension:       0°                                                              Flexion:           125°  Instability:        No varus laxity, no valgus laxity, negative anterior drawer  Deformities:     Varus    Imaging/Studies  Imaging Results (Last 24 Hours)       Procedure Component Value Units Date/Time    XR Knee 4+ View Left [[id number]] Resulted: 04/08/24 1424     " Updated: 04/08/24 1424    Narrative:      Left Knee Radiographs  Indication: left knee pain  Views: Standing AP's and skiers of both knees, with lateral and sunrise   views of the left knee    Comparison: 9/20/2021    Findings:    Bone-on-bone contact medial compartment, tricompartmental osteophytes,   varus alignment, no acute bony abnormalities.  Mild worsening compared to   the previous imaging.  No unusual bony features.  Vascular calcifications.            No new imaging today.    Assessment and Plan     Diagnoses and all orders for this visit:    1. Primary osteoarthritis of left knee (Primary)  -     XR Knee 4+ View Left        1. Primary osteoarthritis of left knee        Reviewed my findings with the patient.  He responded well to the viscosupplementation injection series, is still getting relief.  I will see him back in 6 months, but I will be happy to see him back sooner for any problems.    Return in about 6 months (around 10/8/2024).      Magan Washington MD  04/08/24  14:51 EDT    Dictated Utilizing Dragon Dictation

## 2024-10-07 ENCOUNTER — OFFICE VISIT (OUTPATIENT)
Dept: ORTHOPEDIC SURGERY | Facility: CLINIC | Age: 79
End: 2024-10-07
Payer: MEDICARE

## 2024-10-07 VITALS
HEIGHT: 66 IN | DIASTOLIC BLOOD PRESSURE: 68 MMHG | BODY MASS INDEX: 34.58 KG/M2 | SYSTOLIC BLOOD PRESSURE: 128 MMHG | WEIGHT: 215.2 LBS

## 2024-10-07 DIAGNOSIS — M17.12 PRIMARY OSTEOARTHRITIS OF LEFT KNEE: Primary | ICD-10-CM

## 2024-10-07 PROCEDURE — 99212 OFFICE O/P EST SF 10 MIN: CPT | Performed by: ORTHOPAEDIC SURGERY

## 2024-10-07 PROCEDURE — 3074F SYST BP LT 130 MM HG: CPT | Performed by: ORTHOPAEDIC SURGERY

## 2024-10-07 PROCEDURE — 3078F DIAST BP <80 MM HG: CPT | Performed by: ORTHOPAEDIC SURGERY

## 2024-10-07 PROCEDURE — 1159F MED LIST DOCD IN RCRD: CPT | Performed by: ORTHOPAEDIC SURGERY

## 2024-10-07 PROCEDURE — 1160F RVW MEDS BY RX/DR IN RCRD: CPT | Performed by: ORTHOPAEDIC SURGERY

## 2024-10-07 RX ORDER — CALCIUM CITRATE/VITAMIN D3 200MG-6.25
TABLET ORAL
COMMUNITY
Start: 2024-08-31

## 2024-10-07 RX ORDER — TRIAMTERENE AND HYDROCHLOROTHIAZIDE 37.5; 25 MG/1; MG/1
CAPSULE ORAL
COMMUNITY
Start: 2024-10-03

## 2024-10-07 RX ORDER — GLUCOSAM/CHON-MSM1/C/MANG/BOSW 500-416.6
TABLET ORAL
COMMUNITY
Start: 2024-08-22

## 2024-10-07 RX ORDER — ISOPROPYL ALCOHOL 70 ML/100ML
SWAB TOPICAL
COMMUNITY
Start: 2024-08-22

## 2024-10-07 RX ORDER — ALLOPURINOL 300 MG/1
TABLET ORAL
COMMUNITY
Start: 2024-07-19

## 2024-10-07 RX ORDER — CYCLOBENZAPRINE HCL 5 MG
TABLET ORAL
COMMUNITY
Start: 2024-06-13

## 2024-10-07 RX ORDER — GLIMEPIRIDE 4 MG/1
TABLET ORAL
COMMUNITY
Start: 2024-08-05

## 2024-10-07 NOTE — PROGRESS NOTES
[facility] [place] [facility] [place]    Subjective     Chief Complaint   Patient presents with    Follow-up     6 month f/u; primary osteoarthritis of left knee        HPI    It has been 6  month(s) since Mr. Hoffman's last visit. He returns to clinic today for follow-up of left knee pain. The issue has been ongoing for 1 year(s). He rates his pain a 3/10 on the pain scale. Previous/current treatments: NSAIDS and viscosupplementation (last injection 12/2023). Current symptoms: pain. The pain is worse with walking, standing, and climbing stairs; pain medication and/or NSAID improve the pain. Overall, he is doing the same.  Good relief with viscosupplementation injections in the past.  He would like a repeat series at this time.  He is not interested in surgical intervention.      I have reviewed the following portions of the patient's history and agree with: History of Present Illness and Review of Systems    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    Recurrent herniation of lumbar disc    Bulging lumbar disc    Actinic keratosis    Chronic heart disease    Encounter for long-term (current) use of insulin    HTN (hypertension)    Senile hyperkeratosis    NSTEMI (non-ST elevated myocardial infarction)    CAD (coronary artery disease)    Hypothyroidism (acquired)    T2DM (type 2 diabetes mellitus)    Nocturnal hypoxia    HLD (hyperlipidemia)     Past Medical History:   Diagnosis Date    Acid indigestion     related to meds    Arthritis     Coronary artery disease 2000    s/p 2 stents after MI     Diabetes mellitus 2007    po meds; checks blood sugars at home run     Disease of thyroid gland     hypothyroidism     Hearing loss     uses hearing aids     History of kidney stones     passed    Hypertension     Lumbar back pain     MI, old 2000    2 stents inserted    Wears glasses     Wears hearing aid       Past Surgical History:   Procedure Laterality Date     APPENDECTOMY      CARDIAC CATHETERIZATION N/A 2023    Procedure: Left Heart Cath;  Surgeon: Elza Mena MD;  Location:  [facility];  Service: Cardiology;  Laterality: N/A;    CATARACT EXTRACTION WITH INTRAOCULAR LENS IMPLANT Bilateral     COLONOSCOPY      CORONARY ARTERY BYPASS GRAFT N/A 12/15/2023    Procedure: MEDIAN STERNOTOMY, CORONARY ARTERY BYPASS X3 WITH INTERNAL MAMMARY ARTERY GRAFT, ENDOVASCULAR VEIN HARVEST OF THE RIGHT GREATER SAPHENOUS VEIN;  Surgeon: Adam Darnell MD;  Location:  [facility];  Service: Cardiothoracic;  Laterality: N/A;    CORONARY STENT PLACEMENT  2000    x2    INTERVENTIONAL RADIOLOGY PROCEDURE N/A 2019    Procedure: myelogram lumbar spine;  Surgeon: Karan Pennington MD;  Location:  [facility];  Service: Interventional Radiology    LUMBAR DISCECTOMY Left 10/27/2017    Procedure: LUMBAR DISCECTOMY L5-S1 LEFT ;  Surgeon: Elbert De Anda MD;  Location:  [facility];  Service:     LUMBAR DISCECTOMY Left 2020    Procedure: RE-DO LUMBAR DISCECTOMY L5-S1 LEFT;  Surgeon: Elbert De Anda MD;  Location:  [facility];  Service: Neurosurgery;  Laterality: Left;    NASAL POLYP SURGERY        Family History   Problem Relation Age of Onset    Heart disease Mother     Heart disease Father     Diabetes Sister     Heart disease Brother      Social History     Socioeconomic History    Marital status:    Tobacco Use    Smoking status: Former     Current packs/day: 0.00     Average packs/day: 1 pack/day for 25.0 years (25.0 ttl pk-yrs)     Types: Cigarettes     Start date:      Quit date:      Years since quittin.7    Smokeless tobacco: Current     Types: Chew    Tobacco comments:     hx of cigarette use for 25 years and chewed about 25 years (currently chews on occasion)    Vaping Use    Vaping status: Never Used   Substance and Sexual Activity    Alcohol use: No    Drug use: No    Sexual activity: Defer      Current Outpatient  Medications on File Prior to Visit   Medication Sig Dispense Refill    Alcohol Swabs (DropSafe Alcohol Prep) 70 % pads       allopurinol (ZYLOPRIM) 300 MG tablet TAKE 1 TABLET BY MOUTH DAILY FOR GOUT      aspirin 81 MG EC tablet Take 1 tablet by mouth Daily.      atorvastatin (LIPITOR) 40 MG tablet Take 1 tablet by mouth Every Night. 90 tablet 3    clopidogrel (PLAVIX) 75 MG tablet Take 1 tablet by mouth Daily. 30 tablet 5    colchicine 0.6 MG tablet Take 1 tablet by mouth Daily.      cyclobenzaprine (FLEXERIL) 5 MG tablet TAKE 1 TABLET BY MOUTH THREE TIMES DAILY AS NEEDED FOR MUSCLE      famotidine (PEPCID) 10 MG tablet Take 1 tablet by mouth Every Morning.      ferrous gluconate (FERGON) 324 MG tablet Take 1 tablet by mouth Daily With Breakfast.      Finerenone (Kerendia) 10 MG tablet Take  by mouth.      fluticasone (FLONASE) 50 MCG/ACT nasal spray Administer 2 sprays into the nostril(s) as directed by provider Daily.      furosemide (LASIX) 40 MG tablet Take 1 tablet by mouth 2 (Two) Times a Day.      glimepiride (AMARYL) 4 MG tablet       levothyroxine (SYNTHROID, LEVOTHROID) 150 MCG tablet       metoprolol succinate XL (TOPROL-XL) 100 MG 24 hr tablet       montelukast (SINGULAIR) 10 MG tablet Take 1 tablet by mouth Every Night.      nitroglycerin (NITROSTAT) 0.4 MG SL tablet Place 1 tablet under the tongue Every 5 (Five) Minutes As Needed for Chest Pain. Take no more than 3 doses in 15 minutes.      NovoLIN 70/30 FlexPen (70-30) 100 UNIT/ML suspension pen-injector Inject 30 Units under the skin into the appropriate area as directed 2 (Two) Times a Day.      Nutritional Supplements (JUICE PLUS FIBRE PO) Take 1 dose by mouth Every Morning.      pharmacy consult - MTM Use Daily.      potassium chloride (KLOR-CON M10) 10 MEQ CR tablet       Repatha SureClick solution auto-injector SureClick injection       tamsulosin (FLOMAX) 0.4 MG capsule 24 hr capsule Take 1 capsule by mouth Daily. 30 capsule 2    trandolapril  (MAVIK) 4 MG tablet Take 1 tablet by mouth Every Morning.  3    triamterene-hydrochlorothiazide (DYAZIDE) 37.5-25 MG per capsule       True Metrix Blood Glucose Test test strip       TRUEplus Lancets 30G misc        No current facility-administered medications on file prior to visit.      Allergies   Allergen Reactions    Hydrocodone-Acetaminophen GI Intolerance    Penicillins Rash        Review of Systems   Constitutional:  Negative for activity change, appetite change, chills, diaphoresis, fatigue, fever and unexpected weight change.   HENT:  Negative for congestion, dental problem, drooling, ear discharge, ear pain, facial swelling, hearing loss, mouth sores, nosebleeds, postnasal drip, rhinorrhea, sinus pressure, sneezing, sore throat, tinnitus, trouble swallowing and voice change.    Eyes:  Negative for photophobia, pain, discharge, redness, itching and visual disturbance.   Respiratory:  Negative for apnea, cough, choking, chest tightness, shortness of breath, wheezing and stridor.    Cardiovascular:  Negative for chest pain, palpitations and leg swelling.   Gastrointestinal:  Negative for abdominal distention, abdominal pain, anal bleeding, blood in stool, constipation, diarrhea, nausea, rectal pain and vomiting.   Endocrine: Negative for cold intolerance, heat intolerance, polydipsia, polyphagia and polyuria.   Genitourinary:  Negative for decreased urine volume, difficulty urinating, dysuria, enuresis, flank pain, frequency, genital sores, hematuria and urgency.   Musculoskeletal:  Positive for arthralgias. Negative for back pain, gait problem, joint swelling, myalgias, neck pain and neck stiffness.   Skin:  Negative for color change, pallor, rash and wound.   Allergic/Immunologic: Negative for environmental allergies, food allergies and immunocompromised state.   Neurological:  Negative for dizziness, tremors, seizures, syncope, facial asymmetry, speech difficulty, weakness, light-headedness, numbness and  "headaches.   Hematological:  Negative for adenopathy. Does not bruise/bleed easily.   Psychiatric/Behavioral:  Negative for agitation, behavioral problems, confusion, decreased concentration, dysphoric mood, hallucinations, self-injury, sleep disturbance and suicidal ideas. The patient is not nervous/anxious and is not hyperactive.         Objective      Physical Exam  /68   Ht 167.6 cm (66\")   Wt 97.6 kg (215 lb 3.2 oz)   BMI 34.73 kg/m²     Body mass index is 34.73 kg/m².  BMI is >= 30 and <35. (Class 1 Obesity). The following options were offered after discussion;: referral to primary care      General:   Mental Status:  Alert   Appearance: Cooperative, in no acute distress   Build and Nutrition: Well-nourished well-developed male   Orientation: Alert and oriented to person, place and time   Posture: Normal   Gait: Nonantalgic    Integument:              Left knee: no skin lesions, no rash, no ecchymosis     Lower Extremities:              Left Knee:                          Tenderness:     Medial/lateral joint line tenderness                          Effusion:          Trace                          Swelling:          None                          Crepitus:          Positive                          Atrophy:           None                          Range of motion:        Extension:       0°                                                              Flexion:           125°  Instability:        No varus laxity, no valgus laxity, negative anterior drawer  Deformities:     Varus    Imaging/Studies  Imaging Results (Last 24 Hours)       ** No results found for the last 24 hours. **          No new imaging today.    Assessment and Plan     Diagnoses and all orders for this visit:    1. Primary osteoarthritis of left knee (Primary)  -     Large Joint Arthrocentesis        1. Primary osteoarthritis of left knee          Reviewed my findings with patient.  He is interested in repeat viscosupplementation " injection series, and is not interested in surgical intervention.  We will submit for approval, and I will see him back once it is ready for administration.    Return for after approval of visco injection(s).      Magan Washington MD  10/07/24  15:30 EDT    Dictated Utilizing Dragon Dictation

## 2024-10-28 ENCOUNTER — CLINICAL SUPPORT (OUTPATIENT)
Dept: ORTHOPEDIC SURGERY | Facility: CLINIC | Age: 79
End: 2024-10-28
Payer: MEDICARE

## 2024-10-28 DIAGNOSIS — M17.12 PRIMARY OSTEOARTHRITIS OF LEFT KNEE: Primary | ICD-10-CM

## 2024-10-28 PROCEDURE — 20610 DRAIN/INJ JOINT/BURSA W/O US: CPT | Performed by: ORTHOPAEDIC SURGERY

## 2024-10-28 NOTE — PROGRESS NOTES
[facility] [place] [facility] Note    Subjective     Chief Complaint   Patient presents with    Injections     Orthovisc #1 left knee        HPI    Joni Hoffman is a 79 y.o. male who presents for the first Orthovisc injection into the left knee.    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    Recurrent herniation of lumbar disc    Bulging lumbar disc    Actinic keratosis    Chronic heart disease    Encounter for long-term (current) use of insulin    HTN (hypertension)    Senile hyperkeratosis    NSTEMI (non-ST elevated myocardial infarction)    CAD (coronary artery disease)    Hypothyroidism (acquired)    T2DM (type 2 diabetes mellitus)    Nocturnal hypoxia    HLD (hyperlipidemia)     Past Medical History:   Diagnosis Date    Acid indigestion     related to meds    Arthritis     Coronary artery disease 2000    s/p 2 stents after MI     Diabetes mellitus 2007    po meds; checks blood sugars at home run     Disease of thyroid gland     hypothyroidism     Hearing loss     uses hearing aids     History of kidney stones     passed    Hypertension     Lumbar back pain     MI, old 2000    2 stents inserted    Wears glasses     Wears hearing aid       Past Surgical History:   Procedure Laterality Date    APPENDECTOMY      CARDIAC CATHETERIZATION N/A 12/13/2023    Procedure: Left Heart Cath;  Surgeon: Elza Mena MD;  Location:  [facility];  Service: Cardiology;  Laterality: N/A;    CATARACT EXTRACTION WITH INTRAOCULAR LENS IMPLANT Bilateral     COLONOSCOPY      CORONARY ARTERY BYPASS GRAFT N/A 12/15/2023    Procedure: MEDIAN STERNOTOMY, CORONARY ARTERY BYPASS X3 WITH INTERNAL MAMMARY ARTERY GRAFT, ENDOVASCULAR VEIN HARVEST OF THE RIGHT GREATER SAPHENOUS VEIN;  Surgeon: Adam Darnell MD;  Location: [facility] OR;  Service: Cardiothoracic;  Laterality: N/A;    CORONARY STENT PLACEMENT  2000    x2    INTERVENTIONAL RADIOLOGY PROCEDURE  N/A 2019    Procedure: myelogram lumbar spine;  Surgeon: Karan Pennington MD;  Location:  [facility];  Service: Interventional Radiology    LUMBAR DISCECTOMY Left 10/27/2017    Procedure: LUMBAR DISCECTOMY L5-S1 LEFT ;  Surgeon: Elbert De Anda MD;  Location:  [facility] OR;  Service:     LUMBAR DISCECTOMY Left 2020    Procedure: RE-DO LUMBAR DISCECTOMY L5-S1 LEFT;  Surgeon: Elbert De Anda MD;  Location:  [facility] OR;  Service: Neurosurgery;  Laterality: Left;    NASAL POLYP SURGERY        Family History   Problem Relation Age of Onset    Heart disease Mother     Heart disease Father     Diabetes Sister     Heart disease Brother      Social History     Socioeconomic History    Marital status:    Tobacco Use    Smoking status: Former     Current packs/day: 0.00     Average packs/day: 1 pack/day for 25.0 years (25.0 ttl pk-yrs)     Types: Cigarettes     Start date:      Quit date:      Years since quittin.8    Smokeless tobacco: Current     Types: Chew    Tobacco comments:     hx of cigarette use for 25 years and chewed about 25 years (currently chews on occasion)    Vaping Use    Vaping status: Never Used   Substance and Sexual Activity    Alcohol use: No    Drug use: No    Sexual activity: Defer      Current Outpatient Medications on File Prior to Visit   Medication Sig Dispense Refill    Alcohol Swabs (DropSafe Alcohol Prep) 70 % pads       allopurinol (ZYLOPRIM) 300 MG tablet TAKE 1 TABLET BY MOUTH DAILY FOR GOUT      aspirin 81 MG EC tablet Take 1 tablet by mouth Daily.      atorvastatin (LIPITOR) 40 MG tablet Take 1 tablet by mouth Every Night. 90 tablet 3    clopidogrel (PLAVIX) 75 MG tablet Take 1 tablet by mouth Daily. 30 tablet 5    colchicine 0.6 MG tablet Take 1 tablet by mouth Daily.      cyclobenzaprine (FLEXERIL) 5 MG tablet TAKE 1 TABLET BY MOUTH THREE TIMES DAILY AS NEEDED FOR MUSCLE      famotidine (PEPCID) 10 MG tablet Take 1 tablet by mouth Every Morning.       ferrous gluconate (FERGON) 324 MG tablet Take 1 tablet by mouth Daily With Breakfast.      Finerenone (Kerendia) 10 MG tablet Take  by mouth.      fluticasone (FLONASE) 50 MCG/ACT nasal spray Administer 2 sprays into the nostril(s) as directed by provider Daily.      furosemide (LASIX) 40 MG tablet Take 1 tablet by mouth 2 (Two) Times a Day.      glimepiride (AMARYL) 4 MG tablet       levothyroxine (SYNTHROID, LEVOTHROID) 150 MCG tablet       metoprolol succinate XL (TOPROL-XL) 100 MG 24 hr tablet       montelukast (SINGULAIR) 10 MG tablet Take 1 tablet by mouth Every Night.      nitroglycerin (NITROSTAT) 0.4 MG SL tablet Place 1 tablet under the tongue Every 5 (Five) Minutes As Needed for Chest Pain. Take no more than 3 doses in 15 minutes.      NovoLIN 70/30 FlexPen (70-30) 100 UNIT/ML suspension pen-injector Inject 30 Units under the skin into the appropriate area as directed 2 (Two) Times a Day.      Nutritional Supplements (JUICE PLUS FIBRE PO) Take 1 dose by mouth Every Morning.      pharmacy consult - MTM Use Daily.      potassium chloride (KLOR-CON M10) 10 MEQ CR tablet       Repatha SureClick solution auto-injector SureClick injection       tamsulosin (FLOMAX) 0.4 MG capsule 24 hr capsule Take 1 capsule by mouth Daily. 30 capsule 2    trandolapril (MAVIK) 4 MG tablet Take 1 tablet by mouth Every Morning.  3    triamterene-hydrochlorothiazide (DYAZIDE) 37.5-25 MG per capsule       True Metrix Blood Glucose Test test strip       TRUEplus Lancets 30G misc        No current facility-administered medications on file prior to visit.      Allergies   Allergen Reactions    Hydrocodone-Acetaminophen GI Intolerance    Penicillins Rash        Review of Systems   Constitutional:  Negative for activity change, appetite change, chills, diaphoresis, fatigue, fever and unexpected weight change.   HENT:  Negative for congestion, dental problem, drooling, ear discharge, ear pain, facial swelling, hearing loss, mouth sores,  nosebleeds, postnasal drip, rhinorrhea, sinus pressure, sneezing, sore throat, tinnitus, trouble swallowing and voice change.    Eyes:  Negative for photophobia, pain, discharge, redness, itching and visual disturbance.   Respiratory:  Negative for apnea, cough, choking, chest tightness, shortness of breath, wheezing and stridor.    Cardiovascular:  Negative for chest pain, palpitations and leg swelling.   Gastrointestinal:  Negative for abdominal distention, abdominal pain, anal bleeding, blood in stool, constipation, diarrhea, nausea, rectal pain and vomiting.   Endocrine: Negative for cold intolerance, heat intolerance, polydipsia, polyphagia and polyuria.   Genitourinary:  Negative for decreased urine volume, difficulty urinating, dysuria, enuresis, flank pain, frequency, genital sores, hematuria and urgency.   Musculoskeletal:  Positive for arthralgias. Negative for back pain, gait problem, joint swelling, myalgias, neck pain and neck stiffness.   Skin:  Negative for color change, pallor, rash and wound.   Allergic/Immunologic: Negative for environmental allergies, food allergies and immunocompromised state.   Neurological:  Negative for dizziness, tremors, seizures, syncope, facial asymmetry, speech difficulty, weakness, light-headedness, numbness and headaches.   Hematological:  Negative for adenopathy. Does not bruise/bleed easily.   Psychiatric/Behavioral:  Negative for agitation, behavioral problems, confusion, decreased concentration, dysphoric mood, hallucinations, self-injury, sleep disturbance and suicidal ideas. The patient is not nervous/anxious and is not hyperactive.         Objective      Physical Exam  There were no vitals taken for this visit.    There is no height or weight on file to calculate BMI.    General:   Mental Status:  Alert   Appearance: Cooperative, in no acute distress   Posture: Normal    Assessment and Plan     Diagnoses and all orders for this visit:    1. Primary osteoarthritis  of left knee (Primary)  -     - Large Joint Arthrocentesis: L knee        1. Primary osteoarthritis of left knee        Procedure Note:  The potential benefits of performing a therapeutic knee joint visco supplementation injection, as well as potential risks (including, but not limited to infection, swelling, pain, bleeding, bruising, nerve/blood vessel damage, and pseudoseptic reaction) have been discussed with the patient.  After informed consent, timeout procedure was performed, and the skin on the left knee was prepped with chlorhexidine soap and alcohol, after which ethyl chloride was applied to the skin at the injection site. Via the anterolateral approach, Orthovisc was injected into the knee joint.  The patient tolerated the procedure well. There were no complications.  Band-Aid was applied to the injection site. Post-procedural instructions discussed with the patient and/or their caregiver.    Return in about 1 week (around 11/4/2024) for injection.    Magan Washington MD  10/28/24  16:12 EDT    Dictated Utilizing [employer]

## 2024-10-28 NOTE — PROGRESS NOTES
Procedure   - Large Joint Arthrocentesis: L knee on 10/28/2024 3:47 PM  Indications: pain  Details: 21 G needle, anterolateral approach  Medications: 30 mg Hyaluronan 30 MG/2ML  Outcome: tolerated well, no immediate complications  Procedure, treatment alternatives, risks and benefits explained, specific risks discussed. Consent was given by the patient. Immediately prior to procedure a time out was called to verify the correct patient, procedure, equipment, support staff and site/side marked as required. Patient was prepped and draped in the usual sterile fashion.

## 2024-11-04 ENCOUNTER — CLINICAL SUPPORT (OUTPATIENT)
Dept: ORTHOPEDIC SURGERY | Facility: CLINIC | Age: 79
End: 2024-11-04
Payer: MEDICARE

## 2024-11-04 DIAGNOSIS — M17.12 PRIMARY OSTEOARTHRITIS OF LEFT KNEE: Primary | ICD-10-CM

## 2024-11-04 PROCEDURE — 20610 DRAIN/INJ JOINT/BURSA W/O US: CPT | Performed by: ORTHOPAEDIC SURGERY

## 2024-11-04 NOTE — PROGRESS NOTES
Procedure   - Large Joint Arthrocentesis: L knee on 11/4/2024 3:02 PM  Indications: pain  Details: 21 G needle, anterolateral approach  Medications: 30 mg Hyaluronan 30 MG/2ML  Outcome: tolerated well, no immediate complications  Procedure, treatment alternatives, risks and benefits explained, specific risks discussed. Consent was given by the patient. Immediately prior to procedure a time out was called to verify the correct patient, procedure, equipment, support staff and site/side marked as required. Patient was prepped and draped in the usual sterile fashion.

## 2024-11-04 NOTE — PROGRESS NOTES
[facility]    Subjective     Chief Complaint   Patient presents with    Injections     Left knee Orthovisc injection #2        HPI    Joni Hoffman is a 79 y.o. male who presents for the second Orthovisc injection into the left knee.  Of note, he has seen some improvement.    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    Recurrent herniation of lumbar disc    Bulging lumbar disc    Actinic keratosis    Chronic heart disease    Encounter for long-term (current) use of insulin    HTN (hypertension)    Senile hyperkeratosis    NSTEMI (non-ST elevated myocardial infarction)    CAD (coronary artery disease)    Hypothyroidism (acquired)    T2DM (type 2 diabetes mellitus)    Nocturnal hypoxia    HLD (hyperlipidemia)     Past Medical History:   Diagnosis Date    Acid indigestion     related to meds    Arthritis     Coronary artery disease 2000    s/p 2 stents after MI     Diabetes mellitus 2007    po meds; checks blood sugars at home run     Disease of thyroid gland     hypothyroidism     Hearing loss     uses hearing aids     History of kidney stones     passed    Hypertension     Lumbar back pain     MI, old 2000    2 stents inserted    Wears glasses     Wears hearing aid       Past Surgical History:   Procedure Laterality Date    APPENDECTOMY      CARDIAC CATHETERIZATION N/A 12/13/2023    Procedure: Left Heart Cath;  Surgeon: Elza Mena MD;  Location:  [facility];  Service: Cardiology;  Laterality: N/A;    CATARACT EXTRACTION WITH INTRAOCULAR LENS IMPLANT Bilateral     COLONOSCOPY      CORONARY ARTERY BYPASS GRAFT N/A 12/15/2023    Procedure: MEDIAN STERNOTOMY, CORONARY ARTERY BYPASS X3 WITH INTERNAL MAMMARY ARTERY GRAFT, ENDOVASCULAR VEIN HARVEST OF THE RIGHT GREATER SAPHENOUS VEIN;  Surgeon: Adam Darnell MD;  Location: [facility] OR;  Service: Cardiothoracic;  Laterality: N/A;    CORONARY STENT PLACEMENT   2000    x2    INTERVENTIONAL RADIOLOGY PROCEDURE N/A 2019    Procedure: myelogram lumbar spine;  Surgeon: Karan Pennington MD;  Location:  [facility];  Service: Interventional Radiology    LUMBAR DISCECTOMY Left 10/27/2017    Procedure: LUMBAR DISCECTOMY L5-S1 LEFT ;  Surgeon: Elbert De Anda MD;  Location:  [facility] OR;  Service:     LUMBAR DISCECTOMY Left 2020    Procedure: RE-DO LUMBAR DISCECTOMY L5-S1 LEFT;  Surgeon: Elbert De Anda MD;  Location:  [facility] OR;  Service: Neurosurgery;  Laterality: Left;    NASAL POLYP SURGERY        Family History   Problem Relation Age of Onset    Heart disease Mother     Heart disease Father     Diabetes Sister     Heart disease Brother      Social History     Socioeconomic History    Marital status:    Tobacco Use    Smoking status: Former     Current packs/day: 0.00     Average packs/day: 1 pack/day for 25.0 years (25.0 ttl pk-yrs)     Types: Cigarettes     Start date:      Quit date:      Years since quittin.8    Smokeless tobacco: Current     Types: Chew    Tobacco comments:     hx of cigarette use for 25 years and chewed about 25 years (currently chews on occasion)    Vaping Use    Vaping status: Never Used   Substance and Sexual Activity    Alcohol use: No    Drug use: No    Sexual activity: Defer      Current Outpatient Medications on File Prior to Visit   Medication Sig Dispense Refill    Alcohol Swabs (DropSafe Alcohol Prep) 70 % pads       allopurinol (ZYLOPRIM) 300 MG tablet TAKE 1 TABLET BY MOUTH DAILY FOR GOUT      aspirin 81 MG EC tablet Take 1 tablet by mouth Daily.      atorvastatin (LIPITOR) 40 MG tablet Take 1 tablet by mouth Every Night. 90 tablet 3    clopidogrel (PLAVIX) 75 MG tablet Take 1 tablet by mouth Daily. 30 tablet 5    colchicine 0.6 MG tablet Take 1 tablet by mouth Daily.      cyclobenzaprine (FLEXERIL) 5 MG tablet TAKE 1 TABLET BY MOUTH THREE TIMES DAILY AS NEEDED FOR MUSCLE      famotidine (PEPCID) 10 MG  tablet Take 1 tablet by mouth Every Morning.      ferrous gluconate (FERGON) 324 MG tablet Take 1 tablet by mouth Daily With Breakfast.      Finerenone (Kerendia) 10 MG tablet Take  by mouth.      fluticasone (FLONASE) 50 MCG/ACT nasal spray Administer 2 sprays into the nostril(s) as directed by provider Daily.      furosemide (LASIX) 40 MG tablet Take 1 tablet by mouth 2 (Two) Times a Day.      glimepiride (AMARYL) 4 MG tablet       levothyroxine (SYNTHROID, LEVOTHROID) 150 MCG tablet       metoprolol succinate XL (TOPROL-XL) 100 MG 24 hr tablet       montelukast (SINGULAIR) 10 MG tablet Take 1 tablet by mouth Every Night.      nitroglycerin (NITROSTAT) 0.4 MG SL tablet Place 1 tablet under the tongue Every 5 (Five) Minutes As Needed for Chest Pain. Take no more than 3 doses in 15 minutes.      NovoLIN 70/30 FlexPen (70-30) 100 UNIT/ML suspension pen-injector Inject 30 Units under the skin into the appropriate area as directed 2 (Two) Times a Day.      Nutritional Supplements (JUICE PLUS FIBRE PO) Take 1 dose by mouth Every Morning.      pharmacy consult - MTM Use Daily.      potassium chloride (KLOR-CON M10) 10 MEQ CR tablet       Repatha SureClick solution auto-injector SureClick injection       tamsulosin (FLOMAX) 0.4 MG capsule 24 hr capsule Take 1 capsule by mouth Daily. 30 capsule 2    trandolapril (MAVIK) 4 MG tablet Take 1 tablet by mouth Every Morning.  3    triamterene-hydrochlorothiazide (DYAZIDE) 37.5-25 MG per capsule       True Metrix Blood Glucose Test test strip       TRUEplus Lancets 30G misc        No current facility-administered medications on file prior to visit.      Allergies   Allergen Reactions    Hydrocodone-Acetaminophen GI Intolerance    Penicillins Rash        Review of Systems   Constitutional: Negative.    HENT: Negative.     Eyes: Negative.    Respiratory: Negative.     Cardiovascular: Negative.    Gastrointestinal: Negative.    Endocrine: Negative.    Genitourinary: Negative.     Musculoskeletal:  Positive for arthralgias.   Skin: Negative.    Allergic/Immunologic: Negative.    Neurological: Negative.    Hematological: Negative.    Psychiatric/Behavioral: Negative.          Objective      Physical Exam  There were no vitals taken for this visit.    There is no height or weight on file to calculate BMI.    General:   Mental Status:  Alert   Appearance: Cooperative, in no acute distress   Posture: Normal    Assessment and Plan     Diagnoses and all orders for this visit:    1. Primary osteoarthritis of left knee (Primary)  -     - Large Joint Arthrocentesis: L knee        1. Primary osteoarthritis of left knee        Procedure Note:  The potential benefits of performing a therapeutic knee joint visco supplementation injection, as well as potential risks (including, but not limited to infection, swelling, pain, bleeding, bruising, nerve/blood vessel damage, and pseudoseptic reaction) have been discussed with the patient.  After informed consent, timeout procedure was performed, and the skin on the left knee was prepped with chlorhexidine soap and alcohol, after which ethyl chloride was applied to the skin at the injection site. Via the anterolateral approach, Orthovisc was injected into the knee joint.  The patient tolerated the procedure well. There were no complications.  Band-Aid was applied to the injection site. Post-procedural instructions discussed with the patient and/or their caregiver.    Return in about 1 week (around 11/11/2024) for injection.    Magan Washington MD  11/04/24  15:21 EST    Dictated Utilizing Dragon Dictation

## 2024-11-11 ENCOUNTER — CLINICAL SUPPORT (OUTPATIENT)
Dept: ORTHOPEDIC SURGERY | Facility: CLINIC | Age: 79
End: 2024-11-11
Payer: MEDICARE

## 2024-11-11 DIAGNOSIS — M17.12 PRIMARY OSTEOARTHRITIS OF LEFT KNEE: Primary | ICD-10-CM

## 2024-11-11 NOTE — PROGRESS NOTES
[facility] Note    Subjective     Chief Complaint   Patient presents with    Follow-up     Left knee orthovisc #3 injection        HPI    Joni Hoffman is a 79 y.o. male who presents for the final Orthovisc injection into the left knee.  Of note, he has seen some improvement.    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    Recurrent herniation of lumbar disc    Bulging lumbar disc    Actinic keratosis    Chronic heart disease    Encounter for long-term (current) use of insulin    HTN (hypertension)    Senile hyperkeratosis    NSTEMI (non-ST elevated myocardial infarction)    CAD (coronary artery disease)    Hypothyroidism (acquired)    T2DM (type 2 diabetes mellitus)    Nocturnal hypoxia    HLD (hyperlipidemia)     Past Medical History:   Diagnosis Date    Acid indigestion     related to meds    Arthritis     Coronary artery disease 2000    s/p 2 stents after MI     Diabetes mellitus 2007    po meds; checks blood sugars at home run     Disease of thyroid gland     hypothyroidism     Hearing loss     uses hearing aids     History of kidney stones     passed    Hypertension     Lumbar back pain     MI, old 2000    2 stents inserted    Wears glasses     Wears hearing aid       Past Surgical History:   Procedure Laterality Date    APPENDECTOMY      CARDIAC CATHETERIZATION N/A 12/13/2023    Procedure: Left Heart Cath;  Surgeon: Elza Mena MD;  Location:  [facility] CATH INVASIVE LOCATION;  Service: Cardiology;  Laterality: N/A;    CATARACT EXTRACTION WITH INTRAOCULAR LENS IMPLANT Bilateral     COLONOSCOPY      CORONARY ARTERY BYPASS GRAFT N/A 12/15/2023    Procedure: MEDIAN STERNOTOMY, CORONARY ARTERY BYPASS X3 WITH INTERNAL MAMMARY ARTERY GRAFT, ENDOVASCULAR VEIN HARVEST OF THE RIGHT GREATER SAPHENOUS VEIN;  Surgeon: Adam Darnell MD;  Location:  [facility];  Service: Cardiothoracic;  Laterality: N/A;    CORONARY STENT PLACEMENT   2000    x2    INTERVENTIONAL RADIOLOGY PROCEDURE N/A 2019    Procedure: myelogram lumbar spine;  Surgeon: Karan Pennington MD;  Location:  [facility] INVASIVE LOC[facility];  Service: Interventional Radiology    LUMBAR DISCECTOMY Left 10/27/2017    Procedure: LUMBAR DISCECTOMY L5-S1 LEFT ;  Surgeon: Elbert De Anda MD;  Location:  [facility];  Service:     LUMBAR DISCECTOMY Left 2020    Procedure: RE-DO LUMBAR DISCECTOMY L5-S1 LEFT;  Surgeon: Elbert De Anda MD;  Location:  [facility];  Service: Neurosurgery;  Laterality: Left;    NASAL POLYP SURGERY        Family History   Problem Relation Age of Onset    Heart disease Mother     Heart disease Father     Diabetes Sister     Heart disease Brother      Social History     Socioeconomic History    Marital status:    Tobacco Use    Smoking status: Former     Current packs/day: 0.00     Average packs/day: 1 pack/day for 25.0 years (25.0 ttl pk-yrs)     Types: Cigarettes     Start date:      Quit date:      Years since quittin.8    Smokeless tobacco: Current     Types: Chew    Tobacco comments:     hx of cigarette use for 25 years and chewed about 25 years (currently chews on occasion)    Vaping Use    Vaping status: Never Used   Substance and Sexual Activity    Alcohol use: No    Drug use: No    Sexual activity: Defer      Current Outpatient Medications on File Prior to Visit   Medication Sig Dispense Refill    Alcohol Swabs (DropSafe Alcohol Prep) 70 % pads       allopurinol (ZYLOPRIM) 300 MG tablet TAKE 1 TABLET BY MOUTH DAILY FOR GOUT      aspirin 81 MG EC tablet Take 1 tablet by mouth Daily.      atorvastatin (LIPITOR) 40 MG tablet Take 1 tablet by mouth Every Night. 90 tablet 3    clopidogrel (PLAVIX) 75 MG tablet Take 1 tablet by mouth Daily. 30 tablet 5    colchicine 0.6 MG tablet Take 1 tablet by mouth Daily.      cyclobenzaprine (FLEXERIL) 5 MG tablet TAKE 1 TABLET BY MOUTH THREE TIMES DAILY AS NEEDED FOR MUSCLE      famotidine (PEPCID) 10 MG  tablet Take 1 tablet by mouth Every Morning.      ferrous gluconate (FERGON) 324 MG tablet Take 1 tablet by mouth Daily With Breakfast.      Finerenone (Kerendia) 10 MG tablet Take  by mouth.      fluticasone (FLONASE) 50 MCG/ACT nasal spray Administer 2 sprays into the nostril(s) as directed by provider Daily.      furosemide (LASIX) 40 MG tablet Take 1 tablet by mouth 2 (Two) Times a Day.      glimepiride (AMARYL) 4 MG tablet       levothyroxine (SYNTHROID, LEVOTHROID) 150 MCG tablet       metoprolol succinate XL (TOPROL-XL) 100 MG 24 hr tablet       montelukast (SINGULAIR) 10 MG tablet Take 1 tablet by mouth Every Night.      nitroglycerin (NITROSTAT) 0.4 MG SL tablet Place 1 tablet under the tongue Every 5 (Five) Minutes As Needed for Chest Pain. Take no more than 3 doses in 15 minutes.      NovoLIN 70/30 FlexPen (70-30) 100 UNIT/ML suspension pen-injector Inject 30 Units under the skin into the appropriate area as directed 2 (Two) Times a Day.      Nutritional Supplements (JUICE PLUS FIBRE PO) Take 1 dose by mouth Every Morning.      pharmacy consult - MTM Use Daily.      potassium chloride (KLOR-CON M10) 10 MEQ CR tablet       Repatha SureClick solution auto-injector SureClick injection       tamsulosin (FLOMAX) 0.4 MG capsule 24 hr capsule Take 1 capsule by mouth Daily. 30 capsule 2    trandolapril (MAVIK) 4 MG tablet Take 1 tablet by mouth Every Morning.  3    triamterene-hydrochlorothiazide (DYAZIDE) 37.5-25 MG per capsule       True Metrix Blood Glucose Test test strip       TRUEplus Lancets 30G misc        No current facility-administered medications on file prior to visit.      Allergies   Allergen Reactions    Hydrocodone-Acetaminophen GI Intolerance    Penicillins Rash        Review of Systems   Constitutional:  Negative for activity change, appetite change, chills, diaphoresis, fatigue, fever and unexpected weight change.   HENT:  Negative for congestion, dental problem, drooling, ear discharge, ear  pain, facial swelling, hearing loss, mouth sores, nosebleeds, postnasal drip, rhinorrhea, sinus pressure, sneezing, sore throat, tinnitus, trouble swallowing and voice change.    Eyes:  Negative for photophobia, pain, discharge, redness, itching and visual disturbance.   Respiratory:  Negative for apnea, cough, choking, chest tightness, shortness of breath, wheezing and stridor.    Cardiovascular:  Negative for chest pain, palpitations and leg swelling.   Gastrointestinal:  Negative for abdominal distention, abdominal pain, anal bleeding, blood in stool, constipation, diarrhea, nausea, rectal pain and vomiting.   Endocrine: Negative for cold intolerance, heat intolerance, polydipsia, polyphagia and polyuria.   Genitourinary:  Negative for decreased urine volume, difficulty urinating, dysuria, enuresis, flank pain, frequency, genital sores, hematuria and urgency.   Musculoskeletal:  Positive for arthralgias. Negative for back pain, gait problem, joint swelling, myalgias, neck pain and neck stiffness.   Skin:  Negative for color change, pallor, rash and wound.   Allergic/Immunologic: Negative for environmental allergies, food allergies and immunocompromised state.   Neurological:  Negative for dizziness, tremors, seizures, syncope, facial asymmetry, speech difficulty, weakness, light-headedness, numbness and headaches.   Hematological:  Negative for adenopathy. Does not bruise/bleed easily.   Psychiatric/Behavioral:  Negative for agitation, behavioral problems, confusion, decreased concentration, dysphoric mood, hallucinations, self-injury, sleep disturbance and suicidal ideas. The patient is not nervous/anxious and is not hyperactive.         Objective      Physical Exam  There were no vitals taken for this visit.    There is no height or weight on file to calculate BMI.    General:   Mental Status:  Alert   Appearance: Cooperative, in no acute distress   Posture: Normal    Assessment and Plan     Diagnoses and all  orders for this visit:    1. Primary osteoarthritis of left knee (Primary)  -     Large Joint Arthrocentesis: L knee        1. Primary osteoarthritis of left knee        Procedure Note:  The potential benefits of performing a therapeutic knee joint visco supplementation injection, as well as potential risks (including, but not limited to infection, swelling, pain, bleeding, bruising, nerve/blood vessel damage, and pseudoseptic reaction) have been discussed with the patient.  After informed consent, timeout procedure was performed, and the skin on the left knee was prepped with chlorhexidine soap and alcohol, after which ethyl chloride was applied to the skin at the injection site. Via the anterolateral approach, Orthovisc was injected into the knee joint.  The patient tolerated the procedure well. There were no complications.  Band-Aid was applied to the injection site. Post-procedural instructions discussed with the patient and/or their caregiver.    Return in about 4 months (around 3/11/2025).    Magan Washington MD  11/11/24  15:19 EST    Dictated Utilizing Dragon Dictation

## 2024-11-11 NOTE — PROGRESS NOTES
April 16, 2019       Kailyn Mccarthy   [address] [facility] [address]         RE: Appointment Confirmation    Dear Kailny,    We are writing to confirm the appointment that you have scheduled with Ramón Rivera MD on 6/14/2019, 2:40 PM.    [place] [facility]  [address] [place] [address] [facility] [address]  [place] [address]  [phone]    Please arrive 15 minutes prior to your appointment with your current insurance card(s) and a valid photo ID. If your insurance requires you to pay an office visit co-payment, please be prepared to pay this at the time of service.     We would like to take this opportunity to tell you about [employer] online services. The [employer] web site, [email or web address], allows patients to communicate with us online. Registered patients can schedule appointments, request prescription refills, ask general questions of their health care provider, or make a payment on their account. To register for [employer], go to [email or web address]       Sincerely,  Luna  Clinical Referral Team  [facility]       Procedure   Large Joint Arthrocentesis: L knee  Date/Time: 11/11/2024 2:57 PM  Consent given by: patient  Site marked: site marked  Timeout: Immediately prior to procedure a time out was called to verify the correct patient, procedure, equipment, support staff and site/side marked as required   Supporting Documentation  Indications: pain   Procedure Details  Location: knee - L knee  Preparation: Patient was prepped and draped in the usual sterile fashion  Needle gauge: 21g.  Approach: anterolateral  Medications administered: 30 mg Hyaluronan 30 MG/2ML  Patient tolerance: patient tolerated the procedure well with no immediate complications

## 2025-03-17 ENCOUNTER — OFFICE VISIT (OUTPATIENT)
Dept: ORTHOPEDIC SURGERY | Facility: CLINIC | Age: 80
End: 2025-03-17
Payer: MEDICARE

## 2025-03-17 VITALS
BODY MASS INDEX: 33.88 KG/M2 | DIASTOLIC BLOOD PRESSURE: 78 MMHG | SYSTOLIC BLOOD PRESSURE: 124 MMHG | WEIGHT: 210.8 LBS | HEIGHT: 66 IN

## 2025-03-17 DIAGNOSIS — M17.12 PRIMARY OSTEOARTHRITIS OF LEFT KNEE: Primary | ICD-10-CM

## 2025-03-17 PROCEDURE — 99212 OFFICE O/P EST SF 10 MIN: CPT | Performed by: ORTHOPAEDIC SURGERY

## 2025-03-17 PROCEDURE — 1160F RVW MEDS BY RX/DR IN RCRD: CPT | Performed by: ORTHOPAEDIC SURGERY

## 2025-03-17 PROCEDURE — 1159F MED LIST DOCD IN RCRD: CPT | Performed by: ORTHOPAEDIC SURGERY

## 2025-03-17 PROCEDURE — 3074F SYST BP LT 130 MM HG: CPT | Performed by: ORTHOPAEDIC SURGERY

## 2025-03-17 PROCEDURE — 3078F DIAST BP <80 MM HG: CPT | Performed by: ORTHOPAEDIC SURGERY

## 2025-03-17 RX ORDER — INDOMETHACIN 25 MG/1
CAPSULE ORAL
COMMUNITY
Start: 2025-02-20

## 2025-03-17 RX ORDER — NAPROXEN 500 MG/1
500 TABLET ORAL 2 TIMES DAILY PRN
COMMUNITY
Start: 2025-01-17

## 2025-03-17 NOTE — PROGRESS NOTES
[facility] Note    Subjective     Chief Complaint   Patient presents with    Follow-up     4 month follow up - Primary osteoarthritis of left knee        HPI    It has been 4  month(s) since Mr. Hoffman's last visit. He returns to clinic today for follow-up of left knee arthritis. The issue has been ongoing for 1.5 year(s). He rates his pain a 2/10 on the pain scale. Previous/current treatments: NSAIDS and viscosupplementation (last injection 11/11/24). Current symptoms: pain and popping. The pain is worse with leisure; resting and pain medication and/or NSAID improve the pain. Overall, he is doing better.  Viscosupplementation injections did provide relief.      I have reviewed the following portions of the patient's history and agree with: History of Present Illness and Review of Systems    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    Recurrent herniation of lumbar disc    Bulging lumbar disc    Actinic keratosis    Chronic heart disease    Encounter for long-term (current) use of insulin    HTN (hypertension)    Senile hyperkeratosis    NSTEMI (non-ST elevated myocardial infarction)    CAD (coronary artery disease)    Hypothyroidism (acquired)    T2DM (type 2 diabetes mellitus)    Nocturnal hypoxia    HLD (hyperlipidemia)     Past Medical History:   Diagnosis Date    Acid indigestion     related to meds    Arthritis     Coronary artery disease 2000    s/p 2 stents after MI     Diabetes mellitus 2007    po meds; checks blood sugars at home run     Disease of thyroid gland     hypothyroidism     Hearing loss     uses hearing aids     History of kidney stones     passed    Hypertension     Lumbar back pain     MI, old 2000    2 stents inserted    Wears glasses     Wears hearing aid       Past Surgical History:   Procedure Laterality Date    APPENDECTOMY      CARDIAC CATHETERIZATION N/A 12/13/2023    Procedure: Left Heart Cath;   Surgeon: Elza Mena MD;  Location:  [facility] INVAS[facility] LOCATION;  Service: Cardiology;  Laterality: N/A;    CATARACT EXTRACTION WITH INTRAOCULAR LENS IMPLANT Bilateral     COLONOSCOPY      CORONARY ARTERY BYPASS GRAFT N/A 12/15/2023    Procedure: MEDIAN STERNOTOMY, CORONARY ARTERY BYPASS X3 WITH INTERNAL MAMMARY ARTERY GRAFT, ENDOVASCULAR VEIN HARVEST OF THE RIGHT GREATER SAPHENOUS VEIN;  Surgeon: Adam Darnell MD;  Location:  [facility];  Service: Cardiothoracic;  Laterality: N/A;    CORONARY STENT PLACEMENT  2000    x2    INTERVENTIONAL RADIOLOGY PROCEDURE N/A 2019    Procedure: myelogram lumbar spine;  Surgeon: Karan Pennington MD;  Location:  [facility] INVASIVE LOCATION;  Service: Interventional Radiology    LUMBAR DISCECTOMY Left 10/27/2017    Procedure: LUMBAR DISCECTOMY L5-S1 LEFT ;  Surgeon: Elbert De Anda MD;  Location:  [facility];  Service:     LUMBAR DISCECTOMY Left 2020    Procedure: RE-DO LUMBAR DISCECTOMY L5-S1 LEFT;  Surgeon: Elbert De Anda MD;  Location:  [facility];  Service: Neurosurgery;  Laterality: Left;    NASAL POLYP SURGERY        Family History   Problem Relation Age of Onset    Heart disease Mother     Heart disease Father     Diabetes Sister     Heart disease Brother      Social History     Socioeconomic History    Marital status:    Tobacco Use    Smoking status: Former     Current packs/day: 0.00     Average packs/day: 1 pack/day for 25.0 years (25.0 ttl pk-yrs)     Types: Cigarettes     Start date:      Quit date:      Years since quittin.2    Smokeless tobacco: Current     Types: Chew    Tobacco comments:     hx of cigarette use for 25 years and chewed about 25 years (currently chews on occasion)    Vaping Use    Vaping status: Never Used   Substance and Sexual Activity    Alcohol use: No    Drug use: No    Sexual activity: Defer      Current Outpatient Medications on File Prior to Visit   Medication Sig Dispense Refill    Alcohol Swabs (DropSafe  Alcohol Prep) 70 % pads       allopurinol (ZYLOPRIM) 300 MG tablet TAKE 1 TABLET BY MOUTH DAILY FOR GOUT      aspirin 81 MG EC tablet Take 1 tablet by mouth Daily.      atorvastatin (LIPITOR) 40 MG tablet Take 1 tablet by mouth Every Night. 90 tablet 3    clopidogrel (PLAVIX) 75 MG tablet Take 1 tablet by mouth Daily. 30 tablet 5    colchicine 0.6 MG tablet Take 1 tablet by mouth Daily.      cyclobenzaprine (FLEXERIL) 5 MG tablet TAKE 1 TABLET BY MOUTH THREE TIMES DAILY AS NEEDED FOR MUSCLE      famotidine (PEPCID) 10 MG tablet Take 1 tablet by mouth Every Morning.      ferrous gluconate (FERGON) 324 MG tablet Take 1 tablet by mouth Daily With Breakfast.      Finerenone (Kerendia) 10 MG tablet Take  by mouth.      fluticasone (FLONASE) 50 MCG/ACT nasal spray Administer 2 sprays into the nostril(s) as directed by provider Daily.      furosemide (LASIX) 40 MG tablet Take 1 tablet by mouth 2 (Two) Times a Day.      glimepiride (AMARYL) 4 MG tablet       indomethacin (INDOCIN) 25 MG capsule TAKE 1 CAPSULE BY MOUTH THREE TIMES DAILY AS NEEDED FOR GOUT **DO NOT TAKE WITH NAPROXEN**      levothyroxine (SYNTHROID, LEVOTHROID) 150 MCG tablet       metoprolol succinate XL (TOPROL-XL) 100 MG 24 hr tablet       montelukast (SINGULAIR) 10 MG tablet Take 1 tablet by mouth Every Night.      naproxen (NAPROSYN) 500 MG tablet Take 1 tablet by mouth 2 (Two) Times a Day As Needed. for pain      nitroglycerin (NITROSTAT) 0.4 MG SL tablet Place 1 tablet under the tongue Every 5 (Five) Minutes As Needed for Chest Pain. Take no more than 3 doses in 15 minutes.      NovoLIN 70/30 FlexPen (70-30) 100 UNIT/ML suspension pen-injector Inject 30 Units under the skin into the appropriate area as directed 2 (Two) Times a Day.      Nutritional Supplements (JUICE PLUS FIBRE PO) Take 1 dose by mouth Every Morning.      pharmacy consult - MTM Use Daily.      potassium chloride (KLOR-CON M10) 10 MEQ CR tablet       Repatha SureClick solution  auto-injector SureClick injection       tamsulosin (FLOMAX) 0.4 MG capsule 24 hr capsule Take 1 capsule by mouth Daily. 30 capsule 2    trandolapril (MAVIK) 4 MG tablet Take 1 tablet by mouth Every Morning.  3    triamterene-hydrochlorothiazide (DYAZIDE) 37.5-25 MG per capsule       True Metrix Blood Glucose Test test strip       TRUEplus Lancets 30G misc        No current facility-administered medications on file prior to visit.      Allergies   Allergen Reactions    Hydrocodone-Acetaminophen GI Intolerance    Penicillins Rash        Review of Systems   Constitutional:  Negative for activity change, appetite change, chills, diaphoresis, fatigue, fever and unexpected weight change.   HENT:  Negative for congestion, dental problem, drooling, ear discharge, ear pain, facial swelling, hearing loss, mouth sores, nosebleeds, postnasal drip, rhinorrhea, sinus pressure, sneezing, sore throat, tinnitus, trouble swallowing and voice change.    Eyes:  Negative for photophobia, pain, discharge, redness, itching and visual disturbance.   Respiratory:  Negative for apnea, cough, choking, chest tightness, shortness of breath, wheezing and stridor.    Cardiovascular:  Negative for chest pain, palpitations and leg swelling.   Gastrointestinal:  Negative for abdominal distention, abdominal pain, anal bleeding, blood in stool, constipation, diarrhea, nausea, rectal pain and vomiting.   Endocrine: Negative for cold intolerance, heat intolerance, polydipsia, polyphagia and polyuria.   Genitourinary:  Negative for decreased urine volume, difficulty urinating, dysuria, enuresis, flank pain, frequency, genital sores, hematuria and urgency.   Musculoskeletal:  Positive for arthralgias. Negative for back pain, gait problem, joint swelling, myalgias, neck pain and neck stiffness.   Skin:  Negative for color change, pallor, rash and wound.   Allergic/Immunologic: Negative for environmental allergies, food allergies and immunocompromised  "state.   Neurological:  Negative for dizziness, tremors, seizures, syncope, facial asymmetry, speech difficulty, weakness, light-headedness, numbness and headaches.   Hematological:  Negative for adenopathy. Does not bruise/bleed easily.   Psychiatric/Behavioral:  Negative for agitation, behavioral problems, confusion, decreased concentration, dysphoric mood, hallucinations, self-injury, sleep disturbance and suicidal ideas. The patient is not nervous/anxious and is not hyperactive.         Objective      Physical Exam  /78   Ht 167.6 cm (65.98\")   Wt 95.6 kg (210 lb 12.8 oz)   BMI 34.04 kg/m²     Body mass index is 34.04 kg/m².         General:   Mental Status:  Alert   Appearance: Cooperative, in no acute distress   Build and Nutrition: Overweight by BMI male   Orientation: Alert and oriented to person, place and time   Posture: Normal   Gait: Nonantalgic    Integument:   Left knee: No skin lesions, no rash, no ecchymosis    Lower Extremities:   Left Knee:    Tenderness:  None    Effusion:  None    Swelling:  None    Crepitus: Positive    Range of motion:  Extension: 0°       Flexion: 125°  Instability: No varus laxity, no valgus laxity, negative anterior drawer  Deformities:  Varus      Imaging/Studies  Imaging Results (Last 24 Hours)       ** No results found for the last 24 hours. **          No new imaging today.    Assessment and Plan     Diagnoses and all orders for this visit:    1. Primary osteoarthritis of left knee (Primary)  -     Large Joint Arthrocentesis        1. Primary osteoarthritis of left knee          I reviewed my findings with the patient.  He had good response to the viscosupplementation injection series for his left knee, and may be interested in repeat series in 2 months and we will submit for approval.    Return in about 2 months (around 5/17/2025) for after approval of visco injection(s).      Magan Washington MD  03/17/25  14:42 EDT    Dictated Utilizing Dragon Dictation    "

## 2025-06-06 ENCOUNTER — TELEPHONE (OUTPATIENT)
Dept: ORTHOPEDIC SURGERY | Facility: CLINIC | Age: 80
End: 2025-06-06
Payer: MEDICARE

## (undated) DEVICE — ST INF PRI SMRTSTE 20DRP 2VLV 24ML 117

## (undated) DEVICE — SUT PROLN 3/0 SH D/A 36IN 8522H

## (undated) DEVICE — HOLDER: Brand: DEROYAL

## (undated) DEVICE — TUBING, SUCTION, 1/4" X 10', STRAIGHT: Brand: MEDLINE

## (undated) DEVICE — MEDI-VAC YANKAUER SUCTION HANDLE W/BULBOUS TIP: Brand: CARDINAL HEALTH

## (undated) DEVICE — SUCTION CANISTER 2500CC: Brand: DEROYAL

## (undated) DEVICE — SENSR CERBRL O2 PK/2

## (undated) DEVICE — SUT PDS 1 CTX 36IN VIO PDP371T

## (undated) DEVICE — CVR PROB ULTRASND/TRANSD W/GEL LNG 18X250CM STRL

## (undated) DEVICE — ANGIOGRAPHIC CATHETER: Brand: EXPO™

## (undated) DEVICE — ELECTRD BLD EZ CLN STD 2.5IN

## (undated) DEVICE — PENCL ROCKRSWCH MEGADYNE W/HOLSTR 10FT SS

## (undated) DEVICE — SUT PROLN 7/0 CV BV1 24IN 8304H BX/36

## (undated) DEVICE — 3.0MM PRECISION NEURO (MATCH HEAD)

## (undated) DEVICE — ANTIBACTERIAL UNDYED BRAIDED (POLYGLACTIN 910), SYNTHETIC ABSORBABLE SUTURE: Brand: COATED VICRYL

## (undated) DEVICE — PK MED 10

## (undated) DEVICE — Device

## (undated) DEVICE — PATIENT RETURN ELECTRODE, SINGLE-USE, CONTACT QUALITY MONITORING, ADULT, WITH 9FT CORD, FOR PATIENTS WEIGING OVER 33LBS. (15KG): Brand: MEGADYNE

## (undated) DEVICE — GLV SURG BIOGEL LTX PF 7 1/2

## (undated) DEVICE — ADAPT/Y PERFUS DLP FML/LUER COLR/CODE/CLMP 8.9AND25.4CM

## (undated) DEVICE — GW PERIPH GUIDERIGHT STD/EXCHNG/J/TIP SS 0.035IN 5X260CM

## (undated) DEVICE — SKIN AFFIX SURG ADHESIVE 72/CS 0.55ML: Brand: MEDLINE

## (undated) DEVICE — MEDI-VAC NON-CONDUCTIVE SUCTION TUBING: Brand: CARDINAL HEALTH

## (undated) DEVICE — LEVEL SENSORS PADS ARE USED TO ATTACH THE LEVEL SENSORS TO A HARD SHELL RESERVOIR. INCLUDES COUPLING GEL.: Brand: TERUMO® ADVANCED PERFUSION SYSTEM 1

## (undated) DEVICE — OASIS DRAIN, SINGLE, INLINE & ATS COMPATIBLE: Brand: OASIS

## (undated) DEVICE — AVANTI + 4F STD W/GW: Brand: AVANTI

## (undated) DEVICE — DRSNG WND GZ PAD BORDERED LF 4X5IN STRL

## (undated) DEVICE — ENCORE® LATEX MICRO SIZE 7, STERILE LATEX POWDER-FREE SURGICAL GLOVE: Brand: ENCORE

## (undated) DEVICE — AIRWY SZ11

## (undated) DEVICE — CATHETER,URETHRAL,REDRUBBER,STERILE,22FR: Brand: MEDLINE

## (undated) DEVICE — GLV SURG PREMIERPRO MIC LTX PF SZ8.5 BRN

## (undated) DEVICE — GLIDESHEATH SLENDER STAINLESS STEEL KIT: Brand: GLIDESHEATH SLENDER

## (undated) DEVICE — CVR HNDL LT SURG ACCSSRY BLU STRL

## (undated) DEVICE — DIFFUSER: Brand: CORE, MAESTRO

## (undated) DEVICE — CANN AORT ROOT DLP VNT 14G 7F

## (undated) DEVICE — SUT PROLN 6/0 C1 D/A 30IN 8706H

## (undated) DEVICE — ENCORE® LATEX MICRO SIZE 8, STERILE LATEX POWDER-FREE SURGICAL GLOVE: Brand: ENCORE

## (undated) DEVICE — DRSNG WND BORDR/ADHS NONADHR/GZ LF 4X4IN STRL

## (undated) DEVICE — SUT SILK 0/0 CT2 18IN C027D

## (undated) DEVICE — ADULT, W/LG. BACK PAD, RADIOTRANSPARENT ELEMENT AND LEAD WIRE COMPATIBLE W/: Brand: DEFIBRILLATION ELECTRODES

## (undated) DEVICE — OIL CARTRIDGE: Brand: CORE, MAESTRO

## (undated) DEVICE — FLTR RESERV PERFUS INTERSEPT 02 STRL

## (undated) DEVICE — SUT PROLN 4/0 RB1 D/A 36IN 8557H

## (undated) DEVICE — CANNULA,OXY,ADULT,SUPERSOFT,W/7'TUB,UC: Brand: MEDLINE

## (undated) DEVICE — SYS SKIN CLS DERMABOND PRINEO W/22CM MESH TP

## (undated) DEVICE — SUT SILK 4/0 TIES 18IN A183H

## (undated) DEVICE — CONNECT Y INTERSEPT W/LL 3/8 X 3/8 X 3/8IN

## (undated) DEVICE — PK PERFUS CUST W/CARDIOPLEGIA

## (undated) DEVICE — SUT VIC 0 UR6 27IN VCP603H

## (undated) DEVICE — GLV SURG PREMIERPRO MIC LTX PF SZ6 BRN

## (undated) DEVICE — SUT VIC 0 UR5 27IN VCP376H

## (undated) DEVICE — ENCORE® LATEX MICRO SIZE 6.5, STERILE LATEX POWDER-FREE SURGICAL GLOVE: Brand: ENCORE

## (undated) DEVICE — 3M™ WARMING BLANKET, UPPER BODY, 10 PER CASE, 42268: Brand: BAIR HUGGER™

## (undated) DEVICE — CLTH CLENS READYCLEANSE PERI CARE PK/5

## (undated) DEVICE — STERILE PVP: Brand: MEDLINE INDUSTRIES, INC.

## (undated) DEVICE — TRAP FLD MINIVAC MEGADYNE 100ML

## (undated) DEVICE — GLV SURG PREMIERPRO MIC LTX PF SZ8 BRN

## (undated) DEVICE — BLD SCLPL BEAVR MINI STR 2BVL 180D LF

## (undated) DEVICE — 12 FOOT DISPOSABLE EXTENSION CABLE WITH SAFE CONNECT / SCREW-DOWN

## (undated) DEVICE — HDRST INTUB GENTLETOUCH SLOT 7IN RT

## (undated) DEVICE — DRSNG SURESITE WNDW 4X4.5

## (undated) DEVICE — NDL PERC 1PRT THNWALL W/BASEPLT 18G 7CM

## (undated) DEVICE — CATH DIAG EXPO .045 FL3.5 5F 100CM

## (undated) DEVICE — EZ GLIDE AORTIC CANNULA: Brand: EDWARDS LIFESCIENCES EZ GLIDE AORTIC CANNULA

## (undated) DEVICE — TBG PENCL TELESCP MEGADYNE SMOKE EVAC 10FT

## (undated) DEVICE — 3M™ MEDIPORE™ H SOFT CLOTH SURGICAL TAPE, 2863, 3 IN X 10 YD, 12/CASE: Brand: 3M™ MEDIPORE™

## (undated) DEVICE — SUT SILK 2 SUTUPAK TIE 60IN SA8H 2STRAND

## (undated) DEVICE — SUT PROLN 4/0 SH D/A 36IN 8521H

## (undated) DEVICE — LHK- LEFT HEART KIT BAPTIST: Brand: MEDLINE INDUSTRIES, INC.

## (undated) DEVICE — AVID DUAL STAGE VENOUS DRAINAGE CANNULA: Brand: AVID DUAL STAGE VENOUS DRAINAGE CANNULA

## (undated) DEVICE — TOWEL,OR,DSP,ST,BLUE,STD,4/PK,20PK/CS: Brand: MEDLINE

## (undated) DEVICE — GLV SURG BIOGEL LTX PF 8

## (undated) DEVICE — TRY L/P SFTY A/20G

## (undated) DEVICE — SUT PROLN 2/0 PC3 8833H

## (undated) DEVICE — Device: Brand: PERFECTCUT ROTATING AORTIC PUNCH

## (undated) DEVICE — INSTRUMENT 9560702 RADIANCE ILLUMIN SYS: Brand: METRX® SYSTEM

## (undated) DEVICE — CIL FLEX 30 FLL-RA - PKG: Brand: NAMIC

## (undated) DEVICE — PK CATH CARD 10

## (undated) DEVICE — PAD ARMBRD SURG CONVOL 7.5X20X2IN

## (undated) DEVICE — SYS VASOVIEW2 HEMOPRO ENDOSCOPIC HARVST VESL

## (undated) DEVICE — DEV COMPR RADL PRELUDESYNCEZ 30ML 32CM

## (undated) DEVICE — GLV SURG PREMIERPRO MIC LTX PF SZ6.5 BRN

## (undated) DEVICE — ST EXT IV SMRTSTE 2VLV FIX M LL 6ML 41

## (undated) DEVICE — PK HEART OPN 10